# Patient Record
Sex: FEMALE | Race: WHITE | NOT HISPANIC OR LATINO | Employment: OTHER | URBAN - METROPOLITAN AREA
[De-identification: names, ages, dates, MRNs, and addresses within clinical notes are randomized per-mention and may not be internally consistent; named-entity substitution may affect disease eponyms.]

---

## 2018-04-13 ENCOUNTER — OFFICE VISIT (OUTPATIENT)
Dept: FAMILY MEDICINE | Facility: OTHER | Age: 68
End: 2018-04-13
Attending: NURSE PRACTITIONER
Payer: COMMERCIAL

## 2018-04-13 ENCOUNTER — RADIANT APPOINTMENT (OUTPATIENT)
Dept: GENERAL RADIOLOGY | Facility: OTHER | Age: 68
End: 2018-04-13
Attending: NURSE PRACTITIONER
Payer: COMMERCIAL

## 2018-04-13 VITALS
HEART RATE: 99 BPM | OXYGEN SATURATION: 95 % | RESPIRATION RATE: 18 BRPM | BODY MASS INDEX: 35.19 KG/M2 | WEIGHT: 191.2 LBS | DIASTOLIC BLOOD PRESSURE: 110 MMHG | TEMPERATURE: 98.5 F | HEIGHT: 62 IN | SYSTOLIC BLOOD PRESSURE: 180 MMHG

## 2018-04-13 DIAGNOSIS — I10 BENIGN ESSENTIAL HYPERTENSION: ICD-10-CM

## 2018-04-13 DIAGNOSIS — Z72.0 TOBACCO ABUSE: ICD-10-CM

## 2018-04-13 DIAGNOSIS — J01.00 SUBACUTE MAXILLARY SINUSITIS: ICD-10-CM

## 2018-04-13 DIAGNOSIS — E78.5 HYPERLIPIDEMIA LDL GOAL <100: ICD-10-CM

## 2018-04-13 DIAGNOSIS — L60.0 INGROWN TOENAIL: ICD-10-CM

## 2018-04-13 DIAGNOSIS — Z12.11 SPECIAL SCREENING FOR MALIGNANT NEOPLASMS, COLON: ICD-10-CM

## 2018-04-13 DIAGNOSIS — Z12.31 ENCOUNTER FOR SCREENING MAMMOGRAM FOR BREAST CANCER: ICD-10-CM

## 2018-04-13 DIAGNOSIS — Z76.89 ENCOUNTER TO ESTABLISH CARE: Primary | ICD-10-CM

## 2018-04-13 DIAGNOSIS — Z23 NEED FOR PROPHYLACTIC VACCINATION AND INOCULATION AGAINST COMBINATIONS OF DISEASE: ICD-10-CM

## 2018-04-13 DIAGNOSIS — Z71.6 TOBACCO ABUSE COUNSELING: ICD-10-CM

## 2018-04-13 PROBLEM — Z79.899 TAKING A STATIN MEDICATION: Status: ACTIVE | Noted: 2018-04-13

## 2018-04-13 LAB
ALBUMIN SERPL-MCNC: 4 G/DL (ref 3.4–5)
ALBUMIN UR-MCNC: NEGATIVE MG/DL
ALP SERPL-CCNC: 93 U/L (ref 40–150)
ALT SERPL W P-5'-P-CCNC: 65 U/L (ref 0–50)
ANION GAP SERPL CALCULATED.3IONS-SCNC: 5 MMOL/L (ref 3–14)
APPEARANCE UR: CLEAR
AST SERPL W P-5'-P-CCNC: 57 U/L (ref 0–45)
BASOPHILS # BLD AUTO: 0 10E9/L (ref 0–0.2)
BASOPHILS NFR BLD AUTO: 0.3 %
BILIRUB SERPL-MCNC: 0.8 MG/DL (ref 0.2–1.3)
BILIRUB UR QL STRIP: NEGATIVE
BUN SERPL-MCNC: 9 MG/DL (ref 7–30)
CALCIUM SERPL-MCNC: 9.6 MG/DL (ref 8.5–10.1)
CHLORIDE SERPL-SCNC: 105 MMOL/L (ref 94–109)
CHOLEST SERPL-MCNC: 229 MG/DL
CO2 SERPL-SCNC: 29 MMOL/L (ref 20–32)
COLOR UR AUTO: YELLOW
CREAT SERPL-MCNC: 0.81 MG/DL (ref 0.52–1.04)
DIFFERENTIAL METHOD BLD: ABNORMAL
EOSINOPHIL # BLD AUTO: 0.2 10E9/L (ref 0–0.7)
EOSINOPHIL NFR BLD AUTO: 1.7 %
ERYTHROCYTE [DISTWIDTH] IN BLOOD BY AUTOMATED COUNT: 13.9 % (ref 10–15)
GFR SERPL CREATININE-BSD FRML MDRD: 71 ML/MIN/1.7M2
GLUCOSE SERPL-MCNC: 98 MG/DL (ref 70–99)
GLUCOSE UR STRIP-MCNC: NEGATIVE MG/DL
HCT VFR BLD AUTO: 49.4 % (ref 35–47)
HDLC SERPL-MCNC: 38 MG/DL
HGB BLD-MCNC: 16.5 G/DL (ref 11.7–15.7)
HGB UR QL STRIP: ABNORMAL
KETONES UR STRIP-MCNC: NEGATIVE MG/DL
LDLC SERPL CALC-MCNC: 156 MG/DL
LEUKOCYTE ESTERASE UR QL STRIP: NEGATIVE
LYMPHOCYTES # BLD AUTO: 4.5 10E9/L (ref 0.8–5.3)
LYMPHOCYTES NFR BLD AUTO: 37.5 %
MCH RBC QN AUTO: 29.4 PG (ref 26.5–33)
MCHC RBC AUTO-ENTMCNC: 33.4 G/DL (ref 31.5–36.5)
MCV RBC AUTO: 88 FL (ref 78–100)
MONOCYTES # BLD AUTO: 0.6 10E9/L (ref 0–1.3)
MONOCYTES NFR BLD AUTO: 5 %
NEUTROPHILS # BLD AUTO: 6.7 10E9/L (ref 1.6–8.3)
NEUTROPHILS NFR BLD AUTO: 55.5 %
NITRATE UR QL: NEGATIVE
NONHDLC SERPL-MCNC: 191 MG/DL
PH UR STRIP: 6 PH (ref 5–7)
PLATELET # BLD AUTO: 143 10E9/L (ref 150–450)
POTASSIUM SERPL-SCNC: 4.1 MMOL/L (ref 3.4–5.3)
PROT SERPL-MCNC: 8.9 G/DL (ref 6.8–8.8)
RBC # BLD AUTO: 5.62 10E12/L (ref 3.8–5.2)
RBC #/AREA URNS AUTO: NORMAL /HPF
SODIUM SERPL-SCNC: 139 MMOL/L (ref 133–144)
SOURCE: ABNORMAL
SP GR UR STRIP: <=1.005 (ref 1–1.03)
T4 FREE SERPL-MCNC: 1.03 NG/DL (ref 0.76–1.46)
TRIGL SERPL-MCNC: 174 MG/DL
TSH SERPL DL<=0.005 MIU/L-ACNC: 5.58 MU/L (ref 0.4–4)
UROBILINOGEN UR STRIP-ACNC: 0.2 EU/DL (ref 0.2–1)
WBC # BLD AUTO: 12.1 10E9/L (ref 4–11)
WBC #/AREA URNS AUTO: NORMAL /HPF

## 2018-04-13 PROCEDURE — 84439 ASSAY OF FREE THYROXINE: CPT | Mod: ZL | Performed by: NURSE PRACTITIONER

## 2018-04-13 PROCEDURE — 80053 COMPREHEN METABOLIC PANEL: CPT | Mod: ZL | Performed by: NURSE PRACTITIONER

## 2018-04-13 PROCEDURE — 85025 COMPLETE CBC W/AUTO DIFF WBC: CPT | Mod: ZL | Performed by: NURSE PRACTITIONER

## 2018-04-13 PROCEDURE — G0463 HOSPITAL OUTPT CLINIC VISIT: HCPCS

## 2018-04-13 PROCEDURE — 90472 IMMUNIZATION ADMIN EACH ADD: CPT | Mod: GY | Performed by: NURSE PRACTITIONER

## 2018-04-13 PROCEDURE — 81001 URINALYSIS AUTO W/SCOPE: CPT | Mod: ZL | Performed by: NURSE PRACTITIONER

## 2018-04-13 PROCEDURE — 71046 X-RAY EXAM CHEST 2 VIEWS: CPT | Mod: TC,FY

## 2018-04-13 PROCEDURE — 36415 COLL VENOUS BLD VENIPUNCTURE: CPT | Mod: ZL | Performed by: NURSE PRACTITIONER

## 2018-04-13 PROCEDURE — 84443 ASSAY THYROID STIM HORMONE: CPT | Mod: ZL | Performed by: NURSE PRACTITIONER

## 2018-04-13 PROCEDURE — G0463 HOSPITAL OUTPT CLINIC VISIT: HCPCS | Mod: 25

## 2018-04-13 PROCEDURE — 90471 IMMUNIZATION ADMIN: CPT | Performed by: NURSE PRACTITIONER

## 2018-04-13 PROCEDURE — 99205 OFFICE O/P NEW HI 60 MIN: CPT | Performed by: NURSE PRACTITIONER

## 2018-04-13 PROCEDURE — 90670 PCV13 VACCINE IM: CPT | Performed by: NURSE PRACTITIONER

## 2018-04-13 PROCEDURE — 90715 TDAP VACCINE 7 YRS/> IM: CPT | Performed by: NURSE PRACTITIONER

## 2018-04-13 PROCEDURE — 80061 LIPID PANEL: CPT | Mod: ZL | Performed by: NURSE PRACTITIONER

## 2018-04-13 RX ORDER — LOSARTAN POTASSIUM 50 MG/1
50 TABLET ORAL DAILY
Qty: 30 TABLET | Refills: 1 | Status: SHIPPED | OUTPATIENT
Start: 2018-04-13 | End: 2018-04-25

## 2018-04-13 RX ORDER — SIMVASTATIN 20 MG
20 TABLET ORAL AT BEDTIME
Qty: 90 TABLET | Refills: 1 | Status: SHIPPED | OUTPATIENT
Start: 2018-04-13 | End: 2018-07-16

## 2018-04-13 RX ORDER — OMEGA-3 FATTY ACIDS/FISH OIL 300-1000MG
CAPSULE ORAL
Qty: 90 CAPSULE | Refills: 11 | COMMUNITY
Start: 2018-04-13

## 2018-04-13 RX ORDER — NICOTINE 21 MG/24HR
1 PATCH, TRANSDERMAL 24 HOURS TRANSDERMAL EVERY 24 HOURS
Qty: 28 PATCH | Refills: 5 | Status: SHIPPED | OUTPATIENT
Start: 2018-04-13 | End: 2019-04-16

## 2018-04-13 RX ORDER — AZITHROMYCIN 250 MG/1
TABLET, FILM COATED ORAL
Qty: 11 TABLET | Refills: 0 | Status: SHIPPED | OUTPATIENT
Start: 2018-04-13 | End: 2018-04-25

## 2018-04-13 ASSESSMENT — ANXIETY QUESTIONNAIRES
1. FEELING NERVOUS, ANXIOUS, OR ON EDGE: NOT AT ALL
IF YOU CHECKED OFF ANY PROBLEMS ON THIS QUESTIONNAIRE, HOW DIFFICULT HAVE THESE PROBLEMS MADE IT FOR YOU TO DO YOUR WORK, TAKE CARE OF THINGS AT HOME, OR GET ALONG WITH OTHER PEOPLE: SOMEWHAT DIFFICULT
6. BECOMING EASILY ANNOYED OR IRRITABLE: SEVERAL DAYS
3. WORRYING TOO MUCH ABOUT DIFFERENT THINGS: MORE THAN HALF THE DAYS
5. BEING SO RESTLESS THAT IT IS HARD TO SIT STILL: SEVERAL DAYS
2. NOT BEING ABLE TO STOP OR CONTROL WORRYING: MORE THAN HALF THE DAYS
GAD7 TOTAL SCORE: 9
7. FEELING AFRAID AS IF SOMETHING AWFUL MIGHT HAPPEN: NEARLY EVERY DAY

## 2018-04-13 ASSESSMENT — PAIN SCALES - GENERAL: PAINLEVEL: NO PAIN (0)

## 2018-04-13 ASSESSMENT — PATIENT HEALTH QUESTIONNAIRE - PHQ9: 5. POOR APPETITE OR OVEREATING: NOT AT ALL

## 2018-04-13 NOTE — NURSING NOTE
"Chief Complaint   Patient presents with     URI       Initial BP (!) 180/110 (BP Location: Right arm, Patient Position: Sitting, Cuff Size: Adult Regular)  Pulse 99  Temp 98.5  F (36.9  C) (Tympanic)  Resp 18  Ht 5' 2\" (1.575 m)  Wt 191 lb 3.2 oz (86.7 kg)  SpO2 95%  BMI 34.97 kg/m2 Estimated body mass index is 34.97 kg/(m^2) as calculated from the following:    Height as of this encounter: 5' 2\" (1.575 m).    Weight as of this encounter: 191 lb 3.2 oz (86.7 kg).  Medication Reconciliation: complete   Valerie Bhardwaj MA  "

## 2018-04-13 NOTE — MR AVS SNAPSHOT
After Visit Summary   4/13/2018    Aga Gary    MRN: [id number]           Patient Information     Date Of Birth          1950        Visit Information        Provider Department      4/13/2018 11:00 AM Ana Valdez NP [facility]        Today's Diagnoses     Encounter to establish care    -  1    Subacute maxillary sinusitis        Benign essential hypertension        Tobacco abuse        Encounter for screening mammogram for breast cancer        Ingrown toenail        Special screening for malignant neoplasms, colon        Tobacco abuse counseling          Care Instructions      ASSESSMENT/PLAN:     1. Encounter to establish care  - cholecalciferol (VITAMIN D3) 5000 units TABS tablet; Take 1 tablet (5,000 Units) by mouth daily  Dispense: 90 tablet; Refill: 11  - omega 3 1000 MG CAPS; 3 po daily  Dispense: 90 capsule; Refill: 11  - Multiple Vitamins-Iron (DAILY MULTIPLE VITAMIN/IRON) TABS; Take 1 tablet by mouth daily  Dispense: 90 tablet; Refill: 11      2. Subacute maxillary sinusitis  - Zithromax as directed      Fluids  Rest  Humidity at home - add bacteriostatic solution to humidifier  OTC Mucinex liquid cough and cold  Monitor for temp, treat as needed  Please go to the ER or UC if your symptoms worsen   Please return to clinic if unimproved      3. Benign essential hypertension  - aspirin 81 MG EC tablet; Take 1 tablet (81 mg) by mouth daily  Dispense: 90 tablet; Refill: 3  - TSH with free T4 reflex  - Lipid Profile  - Comprehensive metabolic panel  - CBC with platelets differential  - [facility]sc[facility]  - losartan (COZAAR) 50 MG tablet; Take 1 tablet (50 mg) by mouth daily  Dispense: 30 tablet; Refill: 1  - omega 3 1000 MG CAPS; 3 po daily  Dispense: 90 capsule; Refill: 11  - XR CHEST 2 VW (Clinic Performed); Future    4. Tobacco abuse  - QUITPLAN  Referral; Future  - Tobacco Cessation - Order to Satisfy Health Maintenance  -  nicotine (NICODERM CQ) 21 MG/24HR 24 hr patch; Place 1 patch onto the skin every 24 hours  Dispense: 28 patch; Refill: 5  - XR CHEST 2 VW (Clinic Performed);     5. Encounter for screening mammogram for breast cancer  - MA Screening Digital Bilateral ([facility]); Future    6. Ingrown toenail  - PODIATRY/FOOT & ANKLE SURGERY REFERRAL    7. Special screening for malignant neoplasms, colon  - GENERAL SURG ADULT REFERRAL    8. Tobacco abuse counseling  - QUITPLAN  Referral; Future  - Tobacco Cessation - Order to Satisfy Health Maintenance      FU with me in 2 weeks - sooner as needed  My nurse will call you with all results of labs      Ana Valdez NP  [facility]          HOW TO QUIT SMOKING  Smoking is one of the hardest habits to break. About half of all those who have ever smoked have been able to quit, and most of those (about 70%) who still smoke want to quit. Here are some of the best ways to stop smoking.     KEEP TRYING:  It takes most smokers about 8 tries before they are finally able to fully quit. So, the more often you try and fail, the better your chance of quitting the next time! So, don't give up!    GO COLD TURKEY:  Most ex-smokers quit cold turkey. Trying to cut back gradually doesn't seem to work as well, perhaps because it continues the smoking habit. Also, it is possible to fool yourself by inhaling more while smoking fewer cigarettes. This results in the same amount of nicotine in your body!    GET SUPPORT:  Support programs can make an important difference, especially for the heavy smoker. These groups offer lectures, methods to change your behavior and peer support. Call the free national Quitline for more information. 800-QUIT-NOW (090-151-2217). Low-cost or free programs are offered by many hospitals, local chapters of the American Lung Association (578-041-1424) and the American Cancer Society (653-395-8305). Support at home is important too. Non-smokers can help by offering  praise and encouragement. If the smoker fails to quit, encourage them to try again!    OVER-THE-COUNTER MEDICINES:  For those who can't quit on their own, Nicotine Replacement Therapy (NRT) may make quitting much easier. Certain aids such as the nicotine patch, gum and lozenge are available without a prescription. However, it is best to use these under the guidance of your doctor. The skin patch provides a steady supply of nicotine to the body. Nicotine gum and lozenge gives temporary bursts of low levels of nicotine. Both methods take the edge off the craving for cigarettes. WARNING: If you feel symptoms of nicotine overdose, such as nausea, vomiting, dizziness, weakness, or fast heartbeat, stop using these and see your doctor.    PRESCRIPTION MEDICINES:  After evaluating your smoking patterns and prior attempts at quitting, your doctor may offer a prescription medicine such as bupropion (Zyban, Wellbutrin), varenicline (Chantix, Champix), a niocotine inhaler or nasal spray. Each has its unique advantage and side effects which your doctor can review with you.    HEALTH BENEFITS OF QUITTING:  The benefits of quitting start right away and keep improving the longer you go without smokin minutes: blood pressure and pulse return to normal  8 hours: oxygen levels return to normal  2 days: ability to smell and taste begins to improve as damaged nerves start to regrow  2-3 weeks: circulation and lung function improves  1-9 months: decreased cough, congestion and shortness of breath; less tired  1 year: risk of heart attack decreases by half  5 years: risk of lung cancer decreases by half; risk of stroke becomes the same as a non-smoker  For information about how to quit smoking, visit the following links:  National Cancer [place] ,   Clearing the Air, Quit Smoking Today   - an online booklet. http://www.smokefree.gov/pubs/clearing_the_air.pdf  Smokefree.gov http://smokefree.gov/  QuitNet http://www.quitnet.com/     0195-4068 [facility], [address] [place] [address], [place] [address]. All rights reserved. This information is not intended as a substitute for professional medical care. Always follow your healthcare professional's instructions.    The Benefits of Living Smoke Free  What do you want to gain from quitting? Check off some reasons to quit.  Health Benefits  ___ Reduce my risk of lung cancer, heart disease, chronic lung disease  ___ Have fewer wrinkles and softer skin  ___ Improve my sense of taste and smell  ___ For pregnant women--reduce the risk of having a miscarriage, stillbirth, premature birth, or low-birth-weight baby  Personal Benefits  ___ Feel more in control of my life  ___ Have better-smelling hair, breath, clothes, home, and car  ___ Save time by not having to take smoke breaks, buy cigarettes, or hunt for a light  ___ Have whiter teeth  Family Benefits  ___ Reduce my children s respiratory tract infections  ___ Set a good example for my children  ___ Reduce my family s cancer risk  Financial Benefits  ___ Save hundreds of dollars each year that would be spent on cigarettes  ___ Save money on medical bills  ___ Save on life, health, and car insurance premiums    Those Dollars Add Up!  Cigarettes are expensive, and getting more expensive all the time. Do you realize how much money you are spending on cigarettes per year? What is the average amount you spend on a pack of cigarettes? What is the average number of packs that you smoke per day? Using your answers to these questions, fill in this formula to help you find out:  ($ _____ per pack) ×  ( _____ number of packs per day) × (365 days) =  $ _____ yearly cost of smoking  Besides tobacco, there are other costs, including extra cleaning bills and replacement costs for clothing and furniture; medical expenses for smoking-related illnesses; and higher health, life, and car insurance premiums.    Cigars and Pipes Count Too!  Cigars and pipes are also  dangerous. So are smokeless (chewing) tobacco and snuff. All of these products contain nicotine, a highly addictive substance that has harmful effects on your body. Quitting smoking means giving up all tobacco products.      4971-7401 Hardy [employer], [address] [facility], [place] [address]. All rights reserved. This information is not intended as a substitute for professional medical care. Always follow your healthcare professional's instructions.          Follow-ups after your visit        Additional Services     GENERAL SURG ADULT REFERRAL       Your provider has referred you to: General surgery - [facility] - colon consult    Please be aware that coverage of these services is subject to the terms and limitations of your health insurance plan.  Call member services at your health plan with any benefit or coverage questions.      Please bring the following with you to your appointment:    (1) Any X-Rays, CTs or MRIs which have been performed.  Contact the facility where they were done to arrange for  prior to your scheduled appointment.   (2) List of current medications   (3) This referral request   (4) Any documents/labs given to you for this referral            PODIATRY/FOOT & ANKLE SURGERY REFERRAL       Your provider has referred you to: Ingrown right great toenail - painful - [facility] foot and ankle    Please be aware that coverage of these services is subject to the terms and limitations of your health insurance plan.  Call member services at your health plan with any benefit or coverage questions.      Please bring the following to your appointment:  >>   Any x-rays, CTs or MRIs which have been performed.  Contact the facility where they were done to arrange for  prior to your scheduled appointment.    >>   List of current medications   >>   This referral request   >>   Any documents/labs given to you for this referral            QUITPLAN  Referral       [place] TO[employer]AC[employer] QUITLINES FAX  FORM  Fax form to: [phone]    The clinic will facilitate the referral to the quitline.    Provider Information:  ===============================================================  Ana Valdez NP  ID#: [id number] - [facility] [phone] Fax: [phone]   [email or web address]  Payor: [employer] / Plan: UCARE FOR SENIORS / Product Type: HMO /   ===============================================================    The Public Health Service Guideline does not recommend providing over-the-counter nicotine replacement therapy products without physician authorization to patients with the following conditions: pregnancy, uncontrolled high blood pressure, or cardiovascular diseases.     I authorize the [place] Tobacco Quitlines to provide over-the-counter nicotine replacement products for the patient listed below if the patient's health plan benefits cover NRT or if the patient is eligible for QUITPLAN services.    Patient Consented to:  ===============================================================  - YES - I am ready to quit tobacco and request the above information be given to the quitline so they may contact me.  I understand that one of [place]'s Tobacco Quitlines will inform my provider about my participation.  ===============================================================  Please check the BEST 3-hour call window for them to reach you: 2pm - 5pm  May we leave a message?  YES  Language Preference:  English  Phone Number: Home Phone      [phone]  Mobile          None     E-mail Address: No e-mail address on record    ========================================================================  FOR QUITLINE USE ONLY:  THIS INFORMATION WILL BE PROVIDED BACK TO THE PROVIDER  Contact date: __/ __/__ or ____ Did not reach after three attempts.    Outcome:  __ Enrolled in telephone counseling program  __ Declined  __ Not Reached    Stage of  "readiness: _______________________  Planned Quit Date: ___/ ___/ ___  Comments:       [facility]   This message funded by Blue Cross and Blue [employer] [facility], an independent licensee of the Blue Cross and Blue Shield Association. Rev. 12                  Future tests that were ordered for you today     Open Future Orders        Priority Expected Expires Ordered    MA Screening Digital Bilateral ([facility]) Routine  2019    QUITPLAN  Referral Routine  2018            Who to contact     If you have questions or need follow up information about today's clinic visit or your schedule please contact [facility] directly at [phone].  Normal or non-critical lab and imaging results will be communicated to you by MyChart, letter or phone within 4 business days after the clinic has received the results. If you do not hear from us within 7 days, please contact the clinic through MyChart or phone. If you have a critical or abnormal lab result, we will notify you by phone as soon as possible.  Submit refill requests through [employer] or call your pharmacy and they will forward the refill request to us. Please allow 3 business days for your refill to be completed.          Additional Information About Your Visit        SPIL GAMEShart Information     Vquence lets you send messages to your doctor, view your test results, renew your prescriptions, schedule appointments and more. To sign up, go to [email or web address] . Click on \"Log in\" on the left side of the screen, which will take you to the Welcome page. Then click on \"Sign up Now\" on the right side of the page.     You will be asked to enter the access code listed below, as well as some personal information. Please follow the directions to create your username and password.     Your access code is: [email or web address]  Expires: 2018 11:47 AM     Your access code will  in 90 days. If you need help or a " "new code, please call your [place] clinic or [phone].        Care EveryWhere ID     This is your Care EveryWhere ID. This could be used by other organizations to access your [place] medical records  [id number]        Your Vitals Were     Pulse Temperature Respirations Height Pulse Oximetry BMI (Body Mass Index)    99 98.5  F (36.9  C) (Tympanic) 18 5' 2\" (1.575 m) 95% 34.97 kg/m2       Blood Pressure from Last 3 Encounters:   04/13/18 (!) 180/110    Weight from Last 3 Encounters:   04/13/18 191 lb 3.2 oz (86.7 kg)              We Performed the Following     *UA reflex to Microscopic and Culture - [facility]     CBC with platelets differential     Comprehensive metabolic panel     GENERAL SURG ADULT REFERRAL     Lipid Profile     PODIATRY/FOOT & ANKLE SURGERY REFERRAL     Tobacco Cessation - Order to Satisfy Health Maintenance     TSH with free T4 reflex          Today's Medication Changes          These changes are accurate as of 4/13/18 11:47 AM.  If you have any questions, ask your nurse or doctor.               Start taking these medicines.        Dose/Directions    aspirin 81 MG EC tablet   Used for:  Benign essential hypertension   Started by:  Ana Valdez NP        Dose:  81 mg   Take 1 tablet (81 mg) by mouth daily   Quantity:  90 tablet   Refills:  3       azithromycin 250 MG tablet   Commonly known as:  ZITHROMAX   Used for:  Encounter to establish care   Started by:  Ana Valdez NP        Two tablets first day, then one tablet daily for 9 days.   Quantity:  11 tablet   Refills:  0       cholecalciferol 5000 units Tabs tablet   Commonly known as:  vitamin D3   Used for:  Encounter to establish care   Started by:  Ana Valdez NP        Dose:  5000 Units   Take 1 tablet (5,000 Units) by mouth daily   Quantity:  90 tablet   Refills:  11       DAILY MULTIPLE VITAMIN/IRON Tabs   Used for:  Encounter to establish care   Started by:  Ana Valdez NP        Dose:  1 tablet   Take 1 tablet by mouth " daily   Quantity:  90 tablet   Refills:  11       losartan 50 MG tablet   Commonly known as:  COZAAR   Used for:  Benign essential hypertension   Started by:  Ana Valdez NP        Dose:  50 mg   Take 1 tablet (50 mg) by mouth daily   Quantity:  30 tablet   Refills:  1       nicotine 21 MG/24HR 24 hr patch   Commonly known as:  NICODERM CQ   Used for:  Tobacco abuse   Started by:  Ana Valdez NP        Dose:  1 patch   Place 1 patch onto the skin every 24 hours   Quantity:  28 patch   Refills:  5       omega 3 1000 MG Caps   Used for:  Encounter to establish care, Benign essential hypertension   Started by:  Ana Valdez NP        3 po daily   Quantity:  90 capsule   Refills:  11            Where to get your medicines      These medications were sent to [facility] - [place], [place] - [address]  [address] [place] [address]     Phone:  [phone]     aspirin 81 MG EC tablet    azithromycin 250 MG tablet    losartan 50 MG tablet    nicotine 21 MG/24HR 24 hr patch         Some of these will need a paper prescription and others can be bought over the counter.  Ask your nurse if you have questions.     You don't need a prescription for these medications     cholecalciferol 5000 units Tabs tablet    DAILY MULTIPLE VITAMIN/IRON Tabs    omega 3 1000 MG Caps                Primary Care Provider Fax #    Physician No Ref-Primary [phone]       No address on file        Equal Access to Services     CLAUDIA COOL AH: Bertram quesada Sonaveed, waaxda luqadaha, qaybta kaalmada adeegyada, chandrika edward. So [facility] [phone].    ATENCIÓN: Si habla español, tiene a roy disposición servicios gratuitos de asistencia lingüística. Llame al [phone].    We comply with applicable federal civil rights laws and [place] laws. We do not discriminate on the basis of race, color, national origin, age, disability, sex, sexual orientation, or gender identity.            Thank you!     Thank you for  choosing [facility]  for your care. Our goal is always to provide you with excellent care. Hearing back from our patients is one way we can continue to improve our services. Please take a few minutes to complete the written survey that you may receive in the mail after your visit with us. Thank you!             Your Updated Medication List - Protect others around you: Learn how to safely use, store and throw away your medicines at www.disposemymeds.org.          This list is accurate as of 4/13/18 11:47 AM.  Always use your most recent med list.                   Brand Name Dispense Instructions for use Diagnosis    aspirin 81 MG EC tablet     90 tablet    Take 1 tablet (81 mg) by mouth daily    Benign essential hypertension       azithromycin 250 MG tablet    ZITHROMAX    11 tablet    Two tablets first day, then one tablet daily for 9 days.    Encounter to establish care       cholecalciferol 5000 units Tabs tablet    vitamin D3    90 tablet    Take 1 tablet (5,000 Units) by mouth daily    Encounter to establish care       DAILY MULTIPLE VITAMIN/IRON Tabs     90 tablet    Take 1 tablet by mouth daily    Encounter to establish care       losartan 50 MG tablet    COZAAR    30 tablet    Take 1 tablet (50 mg) by mouth daily    Benign essential hypertension       nicotine 21 MG/24HR 24 hr patch    NICODERM CQ    28 patch    Place 1 patch onto the skin every 24 hours    Tobacco abuse       omega 3 1000 MG Caps     90 capsule    3 po daily    Encounter to establish care, Benign essential hypertension

## 2018-04-13 NOTE — PATIENT INSTRUCTIONS
ASSESSMENT/PLAN:     1. Encounter to establish care  - cholecalciferol (VITAMIN D3) 5000 units TABS tablet; Take 1 tablet (5,000 Units) by mouth daily  Dispense: 90 tablet; Refill: 11  - omega 3 1000 MG CAPS; 3 po daily  Dispense: 90 capsule; Refill: 11  - Multiple Vitamins-Iron (DAILY MULTIPLE VITAMIN/IRON) TABS; Take 1 tablet by mouth daily  Dispense: 90 tablet; Refill: 11      2. Subacute maxillary sinusitis  - Zithromax as directed      Fluids  Rest  Humidity at home - add bacteriostatic solution to humidifier  OTC Mucinex liquid cough and cold  Monitor for temp, treat as needed  Please go to the ER or UC if your symptoms worsen   Please return to clinic if unimproved      3. Benign essential hypertension  - aspirin 81 MG EC tablet; Take 1 tablet (81 mg) by mouth daily  Dispense: 90 tablet; Refill: 3  - TSH with free T4 reflex  - Lipid Profile  - Comprehensive metabolic panel  - CBC with platelets differential  - UA reflex to Microscopic and Culture - [facility]/[place]  - losartan (COZAAR) 50 MG tablet; Take 1 tablet (50 mg) by mouth daily  Dispense: 30 tablet; Refill: 1  - omega 3 1000 MG CAPS; 3 po daily  Dispense: 90 capsule; Refill: 11  - XR CHEST 2 VW (Clinic Performed); Future    4. Tobacco abuse  - QUITPLAN  Referral; Future  - Tobacco Cessation - Order to Satisfy Health Maintenance  - nicotine (NICODERM CQ) 21 MG/24HR 24 hr patch; Place 1 patch onto the skin every 24 hours  Dispense: 28 patch; Refill: 5  - XR CHEST 2 VW (Clinic Performed);     5. Encounter for screening mammogram for breast cancer  - MA Screening Digital Bilateral ([facility]); Future    6. Ingrown toenail  - PODIATRY/FOOT & ANKLE SURGERY REFERRAL    7. Special screening for malignant neoplasms, colon  - GENERAL SURG ADULT REFERRAL    8. Tobacco abuse counseling  - QUITPLAN  Referral; Future  - Tobacco Cessation - Order to Satisfy Health Maintenance      FU with me in 2 weeks - sooner as needed  My nurse will call you with all  results of labs      Ana Valdez NP  [facility]          HOW TO QUIT SMOKING  Smoking is one of the hardest habits to break. About half of all those who have ever smoked have been able to quit, and most of those (about 70%) who still smoke want to quit. Here are some of the best ways to stop smoking.     KEEP TRYING:  It takes most smokers about 8 tries before they are finally able to fully quit. So, the more often you try and fail, the better your chance of quitting the next time! So, don't give up!    GO COLD TURKEY:  Most ex-smokers quit cold turkey. Trying to cut back gradually doesn't seem to work as well, perhaps because it continues the smoking habit. Also, it is possible to fool yourself by inhaling more while smoking fewer cigarettes. This results in the same amount of nicotine in your body!    GET SUPPORT:  Support programs can make an important difference, especially for the heavy smoker. These groups offer lectures, methods to change your behavior and peer support. Call the free national Quitline for more information. 800-QUIT-NOW (396-619-7719). Low-cost or free programs are offered by many hospitals, local chapters of the American Lung Association (063-470-9116) and the American Cancer Society (592-911-8601). Support at home is important too. Non-smokers can help by offering praise and encouragement. If the smoker fails to quit, encourage them to try again!    OVER-THE-COUNTER MEDICINES:  For those who can't quit on their own, Nicotine Replacement Therapy (NRT) may make quitting much easier. Certain aids such as the nicotine patch, gum and lozenge are available without a prescription. However, it is best to use these under the guidance of your doctor. The skin patch provides a steady supply of nicotine to the body. Nicotine gum and lozenge gives temporary bursts of low levels of nicotine. Both methods take the edge off the craving for cigarettes. WARNING: If you feel symptoms of nicotine  overdose, such as nausea, vomiting, dizziness, weakness, or fast heartbeat, stop using these and see your doctor.    PRESCRIPTION MEDICINES:  After evaluating your smoking patterns and prior attempts at quitting, your doctor may offer a prescription medicine such as bupropion (Zyban, Wellbutrin), varenicline (Chantix, Champix), a niocotine inhaler or nasal spray. Each has its unique advantage and side effects which your doctor can review with you.    HEALTH BENEFITS OF QUITTING:  The benefits of quitting start right away and keep improving the longer you go without smokin minutes: blood pressure and pulse return to normal  8 hours: oxygen levels return to normal  2 days: ability to smell and taste begins to improve as damaged nerves start to regrow  2-3 weeks: circulation and lung function improves  1-9 months: decreased cough, congestion and shortness of breath; less tired  1 year: risk of heart attack decreases by half  5 years: risk of lung cancer decreases by half; risk of stroke becomes the same as a non-smoker  For information about how to quit smoking, visit the following links:  National [employer] ,   Clearing the Air, Quit Smoking Today   - an online booklet. http://www.smokefree.gov/pubs/clearing_the_air.pdf  Smokefree.gov http://smokefree.gov/  QuitNet http://www.quitnet.com/    6442-3963 [employer], [address] [place] [address]. All rights reserved. This information is not intended as a substitute for professional medical care. Always follow your healthcare professional's instructions.    The Benefits of Living Smoke Free  What do you want to gain from quitting? Check off some reasons to quit.  Health Benefits  ___ Reduce my risk of lung cancer, heart disease, chronic lung disease  ___ Have fewer wrinkles and softer skin  ___ Improve my sense of taste and smell  ___ For pregnant women reduce the risk of having a miscarriage, stillbirth, premature birth, or low-birth-weight  baby  Personal Benefits  ___ Feel more in control of my life  ___ Have better-smelling hair, breath, clothes, home, and car  ___ Save time by not having to take smoke breaks, buy cigarettes, or hunt for a light  ___ Have whiter teeth  Family Benefits  ___ Reduce my children s respiratory tract infections  ___ Set a good example for my children  ___ Reduce my family s cancer risk  Financial Benefits  ___ Save hundreds of dollars each year that would be spent on cigarettes  ___ Save money on medical bills  ___ Save on life, health, and car insurance premiums    Those Dollars Add Up!  Cigarettes are expensive, and getting more expensive all the time. Do you realize how much money you are spending on cigarettes per year? What is the average amount you spend on a pack of cigarettes? What is the average number of packs that you smoke per day? Using your answers to these questions, fill in this formula to help you find out:  ($ _____ per pack) ×  ( _____ number of packs per day) × (365 days) =  $ _____ yearly cost of smoking  Besides tobacco, there are other costs, including extra cleaning bills and replacement costs for clothing and furniture; medical expenses for smoking-related illnesses; and higher health, life, and car insurance premiums.    Cigars and Pipes Count Too!  Cigars and pipes are also dangerous. So are smokeless (chewing) tobacco and snuff. All of these products contain nicotine, a highly addictive substance that has harmful effects on your body. Quitting smoking means giving up all tobacco products.      3171-9315 [facility], [address] [place] [address], [place] [address]. All rights reserved. This information is not intended as a substitute for professional medical care. Always follow your healthcare professional's instructions.

## 2018-04-13 NOTE — PROGRESS NOTES
SUBJECTIVE:                                                    Aga Gary is a 67 year old female who presents to clinic today for the following health issues:      Establish Care      RESPIRATORY SYMPTOMS    Duration: a week    Description  nasal congestion, sore throat, facial pain/pressure, cough, headache, fatigue/malaise and diarrhea    Severity: moderate    Accompanying signs and symptoms: None    History (predisposing factors):  strep exposure    Precipitating or alleviating factors: None    Therapies tried and outcome:  rest and fluids and cold/flu medicine        Left Big Toe sore    Duration: 2 days    Description (location/character/radiation): sore near nail of left big toe    Intensity:  moderate    Accompanying signs and symptoms: none    History (similar episodes/previous evaluation): None    Precipitating or alleviating factors: None    Therapies tried and outcome: None         HTN - new diagnosis, has not checked BP's at home.  Is not following a low salt diet    Strong FH of CVD    Has not had health care in years.  Due for ALL preventive screening, labs, and vaccinations.      Problem list and histories reviewed & adjusted, as indicated.  Additional history: as documented    Patient Active Problem List   Diagnosis     Benign essential hypertension     Tobacco abuse     History reviewed. No pertinent surgical history.    Social History   Substance Use Topics     Smoking status: Current Every Day Smoker     Smokeless tobacco: Never Used     Alcohol use No     History reviewed. No pertinent family history.      Current Outpatient Prescriptions   Medication Sig Dispense Refill     nicotine (NICODERM CQ) 21 MG/24HR 24 hr patch Place 1 patch onto the skin every 24 hours 28 patch 5     azithromycin (ZITHROMAX) 250 MG tablet Two tablets first day, then one tablet daily for 9 days. 11 tablet 0     aspirin 81 MG EC tablet Take 1 tablet (81 mg) by mouth daily 90 tablet 3     losartan (COZAAR) 50 MG  "tablet Take 1 tablet (50 mg) by mouth daily 30 tablet 1     cholecalciferol (VITAMIN D3) 5000 units TABS tablet Take 1 tablet (5,000 Units) by mouth daily 90 tablet 11     omega 3 1000 MG CAPS 3 po daily 90 capsule 11     Multiple Vitamins-Iron (DAILY MULTIPLE VITAMIN/IRON) TABS Take 1 tablet by mouth daily 90 tablet 11     No Known Allergies  No lab results found.   BP Readings from Last 3 Encounters:   04/13/18 (!) 180/110    Wt Readings from Last 3 Encounters:   04/13/18 191 lb 3.2 oz (86.7 kg)                  Labs reviewed in EPIC    ROS:  CONSTITUTIONAL: NEGATIVE for fever, chills, change in weight  INTEGUMENTARY/SKIN: NEGATIVE for worrisome rashes, moles or lesions  EYES: NEGATIVE for vision changes or irritation  ENT/MOUTH: PND, facial pressure  RESP: Cough with her URI  BREAST: NEGATIVE for masses, tenderness or discharge  CV: NEGATIVE for chest pain, palpitations or peripheral edema  GI: NEGATIVE for nausea, abdominal pain, heartburn, or change in bowel habits  : NEGATIVE for frequency, dysuria, or hematuria  MUSCULOSKELETAL: NEGATIVE for significant arthralgias or myalgia  NEURO: NEGATIVE for weakness, dizziness or paresthesias  ENDOCRINE: NEGATIVE for temperature intolerance, skin/hair changes  HEME: NEGATIVE for bleeding problems  PSYCHIATRIC: NEGATIVE for changes in mood or affect    OBJECTIVE:     BP (!) 180/110 (BP Location: Right arm, Patient Position: Sitting, Cuff Size: Adult Regular)  Pulse 99  Temp 98.5  F (36.9  C) (Tympanic)  Resp 18  Ht 5' 2\" (1.575 m)  Wt 191 lb 3.2 oz (86.7 kg)  SpO2 95%  BMI 34.97 kg/m2  Body mass index is 34.97 kg/(m^2).     GENERAL: healthy, alert and no distress  EYES: Eyes grossly normal to inspection, PERRL and conjunctivae and sclerae normal  HENT: sinuses: maxillary, frontal tenderness on both sides, yellow PND  NECK: no adenopathy, no asymmetry, masses, or scars and thyroid normal to palpation  RESP: lungs clear to auscultation - no rales, rhonchi or " wheezes  CV: regular rate and rhythm, normal S1 S2, no S3 or S4, no murmur, click or rub, no peripheral edema and peripheral pulses strong  ABDOMEN: soft, nontender, no hepatosplenomegaly, no masses and bowel sounds normal  MS: no gross musculoskeletal defects noted, no edema  SKIN: no suspicious lesions or rashes  NEURO: Normal strength and tone, mentation intact and speech normal  PSYCH: mentation appears normal, affect normal/bright        Results for orders placed or performed in visit on 04/13/18 (from the past 48 hour(s))   TSH with free T4 reflex   Result Value Ref Range    TSH 5.58 (H) 0.40 - 4.00 mU/L   Lipid Profile   Result Value Ref Range    Cholesterol 229 (H) <200 mg/dL    Triglycerides 174 (H) <150 mg/dL    HDL Cholesterol 38 (L) >49 mg/dL    LDL Cholesterol Calculated 156 (H) <100 mg/dL    Non HDL Cholesterol 191 (H) <130 mg/dL   Comprehensive metabolic panel   Result Value Ref Range    Sodium 139 133 - 144 mmol/L    Potassium 4.1 3.4 - 5.3 mmol/L    Chloride 105 94 - 109 mmol/L    Carbon Dioxide 29 20 - 32 mmol/L    Anion Gap 5 3 - 14 mmol/L    Glucose 98 70 - 99 mg/dL    Urea Nitrogen 9 7 - 30 mg/dL    Creatinine 0.81 0.52 - 1.04 mg/dL    GFR Estimate 71 >60 mL/min/1.7m2    GFR Estimate If Black 86 >60 mL/min/1.7m2    Calcium 9.6 8.5 - 10.1 mg/dL    Bilirubin Total 0.8 0.2 - 1.3 mg/dL    Albumin 4.0 3.4 - 5.0 g/dL    Protein Total 8.9 (H) 6.8 - 8.8 g/dL    Alkaline Phosphatase 93 40 - 150 U/L    ALT 65 (H) 0 - 50 U/L    AST 57 (H) 0 - 45 U/L   CBC with platelets differential   Result Value Ref Range    WBC 12.1 (H) 4.0 - 11.0 10e9/L    RBC Count 5.62 (H) 3.8 - 5.2 10e12/L    Hemoglobin 16.5 (H) 11.7 - 15.7 g/dL    Hematocrit 49.4 (H) 35.0 - 47.0 %    MCV 88 78 - 100 fl    MCH 29.4 26.5 - 33.0 pg    MCHC 33.4 31.5 - 36.5 g/dL    RDW 13.9 10.0 - 15.0 %    Platelet Count 143 (L) 150 - 450 10e9/L    Diff Method Automated Method     % Neutrophils 55.5 %    % Lymphocytes 37.5 %    % Monocytes 5.0 %    %  Eosinophils 1.7 %    % Basophils 0.3 %    Absolute Neutrophil 6.7 1.6 - 8.3 10e9/L    Absolute Lymphocytes 4.5 0.8 - 5.3 10e9/L    Absolute Monocytes 0.6 0.0 - 1.3 10e9/L    Absolute Eosinophils 0.2 0.0 - 0.7 10e9/L    Absolute Basophils 0.0 0.0 - 0.2 10e9/L   T4 free   Result Value Ref Range    T4 Free 1.03 0.76 - 1.46 ng/dL   *UA reflex to Microscopic and Culture - [facility]   Result Value Ref Range    Color Urine Yellow     Appearance Urine Clear     Glucose Urine Negative NEG^Negative mg/dL    Bilirubin Urine Negative NEG^Negative    Ketones Urine Negative NEG^Negative mg/dL    Specific Gravity Urine <=1.005 1.003 - 1.035    Blood Urine Trace (A) NEG^Negative    pH Urine 6.0 5.0 - 7.0 pH    Protein Albumin Urine Negative NEG^Negative mg/dL    Urobilinogen Urine 0.2 0.2 - 1.0 EU/dL    Nitrite Urine Negative NEG^Negative    Leukocyte Esterase Urine Negative NEG^Negative    Source Midstream Urine    Urine Microscopic   Result Value Ref Range    WBC Urine 0 - 5 OTO5^0 - 5 /HPF    RBC Urine O - 2 OTO2^O - 2 /HPF       Visit time:   Over 60 minutes are spent with the patient.   Greater than 50 % of our visit time was spent face to face and included education and counseling regarding current conditions,  medication management, and plan of care.  We discussed the importance of medication compliance.  Visit Content:   All preventive health discussed and arranged  Tobacco education provided  FU visits scheduled  Lab testing discussed and reviewed  Any medication adjustment has been reviewed  Health Maintenance - updated as appropriate.  Record review completed      ASSESSMENT/PLAN:     1. Encounter to establish care  - cholecalciferol (VITAMIN D3) 5000 units TABS tablet; Take 1 tablet (5,000 Units) by mouth daily  Dispense: 90 tablet; Refill: 11  - omega 3 1000 MG CAPS; 3 po daily  Dispense: 90 capsule; Refill: 11  - Multiple Vitamins-Iron (DAILY MULTIPLE VITAMIN/IRON) TABS; Take 1 tablet by mouth daily  Dispense:  90 tablet; Refill: 11      2. Subacute maxillary sinusitis  - Zithromax as directed      Fluids  Rest  Humidity at home - add bacteriostatic solution to humidifier  OTC Mucinex liquid cough and cold  Monitor for temp, treat as needed  Please go to the ER or UC if your symptoms worsen   Please return to clinic if unimproved      3. Benign essential hypertension  - aspirin 81 MG EC tablet; Take 1 tablet (81 mg) by mouth daily  Dispense: 90 tablet; Refill: 3  - TSH with free T4 reflex  - Lipid Profile  - Comprehensive metabolic panel  - CBC with platelets differential  - UA reflex to Microscopic and Culture - [facility]/[place]  - losartan (COZAAR) 50 MG tablet; Take 1 tablet (50 mg) by mouth daily  Dispense: 30 tablet; Refill: 1  - omega 3 1000 MG CAPS; 3 po daily  Dispense: 90 capsule; Refill: 11  - XR CHEST 2 VW (Clinic Performed); Future    4. Tobacco abuse  - QUITPLAN  Referral; Future  - Tobacco Cessation - Order to Satisfy Health Maintenance  - nicotine (NICODERM CQ) 21 MG/24HR 24 hr patch; Place 1 patch onto the skin every 24 hours  Dispense: 28 patch; Refill: 5  - XR CHEST 2 VW (Clinic Performed);     5. Encounter for screening mammogram for breast cancer  - MA Screening Digital Bilateral ([facility]); Future    6. Ingrown toenail  - PODIATRY/FOOT & ANKLE SURGERY REFERRAL    7. Special screening for malignant neoplasms, colon  - GENERAL SURG ADULT REFERRAL    8. Tobacco abuse counseling  - QUITPLAN  Referral; Future  - Tobacco Cessation - Order to Satisfy Health Maintenance      FU with me in 2 weeks - sooner as needed  My nurse will call you with all results of labs      Ana Valdez NP  [facility]

## 2018-04-13 NOTE — PROGRESS NOTES
Lipid profile elevated - HTN - smoker - see below for risk report:    The 10-year ASCVD risk score (Sonyaagustin NORWOOD Jr, et al., 2013) is: 32%    Values used to calculate the score:      Age: 67 years      Sex: Female      Is Non- : No      Diabetic: No      Tobacco smoker: Yes      Systolic Blood Pressure: 180 mmHg      Is BP treated: Yes      HDL Cholesterol: 38 mg/dL      Total Cholesterol: 229 mg/dL      I sent Zocor to [facility].    TSH a bit elevated, will recheck in 6 weeks - will discuss at visit for BP recheck (scheduled)    Ill go over her labs with Dr Aguirre Monday as well.    Ana PLEITEZOK  [phone]

## 2018-04-14 ASSESSMENT — ANXIETY QUESTIONNAIRES: GAD7 TOTAL SCORE: 9

## 2018-04-14 ASSESSMENT — PATIENT HEALTH QUESTIONNAIRE - PHQ9: SUM OF ALL RESPONSES TO PHQ QUESTIONS 1-9: 18

## 2018-04-19 ENCOUNTER — TRANSFERRED RECORDS (OUTPATIENT)
Dept: HEALTH INFORMATION MANAGEMENT | Facility: CLINIC | Age: 68
End: 2018-04-19

## 2018-04-25 ENCOUNTER — OFFICE VISIT (OUTPATIENT)
Dept: FAMILY MEDICINE | Facility: OTHER | Age: 68
End: 2018-04-25
Attending: NURSE PRACTITIONER
Payer: COMMERCIAL

## 2018-04-25 VITALS
HEIGHT: 62 IN | TEMPERATURE: 98 F | OXYGEN SATURATION: 91 % | HEART RATE: 87 BPM | SYSTOLIC BLOOD PRESSURE: 158 MMHG | WEIGHT: 194 LBS | RESPIRATION RATE: 18 BRPM | BODY MASS INDEX: 35.7 KG/M2 | DIASTOLIC BLOOD PRESSURE: 90 MMHG

## 2018-04-25 DIAGNOSIS — E78.5 HYPERLIPIDEMIA LDL GOAL <100: Primary | ICD-10-CM

## 2018-04-25 DIAGNOSIS — I10 BENIGN ESSENTIAL HYPERTENSION: ICD-10-CM

## 2018-04-25 PROCEDURE — 99214 OFFICE O/P EST MOD 30 MIN: CPT | Performed by: NURSE PRACTITIONER

## 2018-04-25 PROCEDURE — G0463 HOSPITAL OUTPT CLINIC VISIT: HCPCS

## 2018-04-25 RX ORDER — LOSARTAN POTASSIUM 50 MG/1
TABLET ORAL
Qty: 45 TABLET | Refills: 1 | Status: SHIPPED | OUTPATIENT
Start: 2018-04-25 | End: 2018-05-23

## 2018-04-25 ASSESSMENT — ANXIETY QUESTIONNAIRES
2. NOT BEING ABLE TO STOP OR CONTROL WORRYING: MORE THAN HALF THE DAYS
GAD7 TOTAL SCORE: 10
1. FEELING NERVOUS, ANXIOUS, OR ON EDGE: MORE THAN HALF THE DAYS
6. BECOMING EASILY ANNOYED OR IRRITABLE: SEVERAL DAYS
7. FEELING AFRAID AS IF SOMETHING AWFUL MIGHT HAPPEN: SEVERAL DAYS
3. WORRYING TOO MUCH ABOUT DIFFERENT THINGS: MORE THAN HALF THE DAYS
IF YOU CHECKED OFF ANY PROBLEMS ON THIS QUESTIONNAIRE, HOW DIFFICULT HAVE THESE PROBLEMS MADE IT FOR YOU TO DO YOUR WORK, TAKE CARE OF THINGS AT HOME, OR GET ALONG WITH OTHER PEOPLE: NOT DIFFICULT AT ALL
5. BEING SO RESTLESS THAT IT IS HARD TO SIT STILL: SEVERAL DAYS

## 2018-04-25 ASSESSMENT — PATIENT HEALTH QUESTIONNAIRE - PHQ9: 5. POOR APPETITE OR OVEREATING: SEVERAL DAYS

## 2018-04-25 ASSESSMENT — PAIN SCALES - GENERAL: PAINLEVEL: NO PAIN (0)

## 2018-04-25 NOTE — MR AVS SNAPSHOT
After Visit Summary   4/25/2018    Aga Gary    MRN: [id number]           Patient Information     Date Of Birth          1950        Visit Information        Provider Department      4/25/2018 1:15 PM Ana Valdez NP [facility]        Today's Diagnoses     Hyperlipidemia LDL goal <100    -  1    Benign essential hypertension          Care Instructions      Results for orders placed or performed in visit on 04/13/18   XR CHEST 2 VW (Clinic Performed)    Narrative    PROCEDURE:  XR CHEST 2 VW    HISTORY:  smoker, HTN; Benign essential hypertension; Tobacco abuse.     COMPARISON:  None.    FINDINGS:   The cardiac silhouette is normal in size. The pulmonary vasculature is  normal.  The lungs are clear. No pleural effusion or pneumothorax.      Impression    IMPRESSION:  No acute cardiopulmonary disease.      COOPER MILLER MD       4/13/18 12:01 PM [id number]    Component Results   Component Value Flag Ref Range Units Status Collected Lab   Cholesterol 229 (H) <200 mg/dL Final 04/13/2018 12:01 PM HI   Comment:   Desirable:       <200 mg/dl   Triglycerides 174 (H) <150 mg/dL Final 04/13/2018 12:01 PM HI   Comment:   Borderline high:  150-199 mg/dl   High:             200-499 mg/dl   Very high:       >499 mg/dl   Non Fasting      HDL Cholesterol 38 (L) >49 mg/dL Final 04/13/2018 12:01 PM HI   LDL Cholesterol Calculated 156 (H) <100 mg/dL Final 04/13/2018 12:01 PM HI   Comment:   Above desirable:  100-129 mg/dl   Borderline High:  130-159 mg/dL   High:             160-189 mg/dL   Very high:       >189 mg/dl      Non HDL Cholesterol 191 (H) <130 mg/dL Final 04/13/2018 12:01 PM HI   Comment:   Above Desirable:  130-159 mg/dl   Borderline high:  160-189 mg/dl   High:             190-219 mg/dl   Very high:       >219 mg/dl      4/13/18 12:01 PM [id number]    Component Results   Component Value Flag Ref Range Units Status Collected Lab   TSH 5.58 (H) 0.40 - 4.00 mU/L Final 04/13/2018 12:01 PM       4/13/18 12:01 PM [id number]    Component Results   Component Value Flag Ref Range Units Status Collected Lab   Sodium 139  133 - 144 mmol/L Final 04/13/2018 12:01 PM HI   Potassium 4.1  3.4 - 5.3 mmol/L Final 04/13/2018 12:01 PM HI   Chloride 105  94 - 109 mmol/L Final 04/13/2018 12:01 PM HI   Carbon Dioxide 29  20 - 32 mmol/L Final 04/13/2018 12:01 PM HI   Anion Gap 5  3 - 14 mmol/L Final 04/13/2018 12:01 PM HI   Glucose 98  70 - 99 mg/dL Final 04/13/2018 12:01 PM HI   Comment:   Non Fasting   Urea Nitrogen 9  7 - 30 mg/dL Final 04/13/2018 12:01 PM HI   Creatinine 0.81  0.52 - 1.04 mg/dL Final 04/13/2018 12:01 PM HI   GFR Estimate 71  >60 mL/min/1.7m2 Final 04/13/2018 12:01 PM HI   Comment:   Non  GFR Calc   GFR Estimate If Black 86  >60 mL/min/1.7m2 Final 04/13/2018 12:01 PM HI   Comment:   African American GFR Calc   Calcium 9.6  8.5 - 10.1 mg/dL Final 04/13/2018 12:01 PM HI   Bilirubin Total 0.8  0.2 - 1.3 mg/dL Final 04/13/2018 12:01 PM HI   Albumin 4.0  3.4 - 5.0 g/dL Final 04/13/2018 12:01 PM HI   Protein Total 8.9 (H) 6.8 - 8.8 g/dL Final 04/13/2018 12:01 PM HI   Alkaline Phosphatase 93  40 - 150 U/L Final 04/13/2018 12:01 PM HI   ALT 65 (H) 0 - 50 U/L Final 04/13/2018 12:01 PM HI   AST 57 (H) 0 - 45 U/L Final 04/13/2018 12:01 PM HI     4/13/18 12:11 PM [id number]    Component Results   Component Value Flag Ref Range Units Status Collected Lab   Color Urine Yellow    Final 04/13/2018 12:11 PM MT   Appearance Urine Clear    Final 04/13/2018 12:11 PM MT   Glucose Urine Negative  NEG^Negative mg/dL Final 04/13/2018 12:11 PM MT   Bilirubin Urine Negative  NEG^Negative  Final 04/13/2018 12:11 PM MT   Ketones Urine Negative  NEG^Negative mg/dL Final 04/13/2018 12:11 PM MT   Specific Gravity Urine <=1.005  1.003 - 1.035  Final 04/13/2018 12:11 PM MT   Blood Urine Trace (A) NEG^Negative  Final 04/13/2018 12:11 PM MT   pH Urine 6.0  5.0 - 7.0 pH Final 04/13/2018 12:11 PM MT   Protein Albumin Urine  Negative  NEG^Negative mg/dL Final 04/13/2018 12:11 PM MT   Urobilinogen Urine 0.2  0.2 - 1.0 EU/dL Final 04/13/2018 12:11 PM MT   Nitrite Urine Negative  NEG^Negative  Final 04/13/2018 12:11 PM MT   Leukocyte Esterase Urine Negative  NEG^Negative  Final 04/13/2018 12:11 PM MT   Source Midstream Urine    Final 04/13/2018 12:11 PM MT         ASSESSMENT/PLAN:         ASSESSMENT/PLAN:       1. Hyperlipidemia LDL goal <100  - INTERNAL MEDICINE REFERRAL  - Continue plan. of care - Zocor as directed  - Fish oil OTC  - Aspirin as directed    2. Benign essential hypertension - Goal 120's/70's  - INTERNAL MEDICINE REFERRAL  - losartan (COZAAR) 50 MG tablet; Take 1.5 po qd  Dispense: 45 tablet; Refill: 1    3.  Toe pain  - FU with podiatry as needed      FU with me First or second week of August, am fasting      Ana Valdez NP  [facility]    [employer] [place]                Follow-ups after your visit        Additional Services     INTERNAL MEDICINE REFERRAL       Your provider has referred you to: Dr. Aguirre - [facility].  HTN, lipids, multiple CVD risk factors.  Consult only    Please be aware that coverage of these services is subject to the terms and limitations of your health insurance plan.  Call member services at your health plan with any benefit or coverage questions.      Please bring the following to your appointment:  >>   Any x-rays, CTs or MRIs which have been performed.  Contact the facility where they were done to arrange for  prior to your scheduled appointment.   >>   List of current medications   >>   This referral request   >>   Any documents/labs given to you for this referral                  Your next 10 appointments already scheduled     May 03, 2018 11:30 AM CDT   (Arrive by 11:15 AM)   CONSULT with Finn Solorio MD   [facility] ([facility] )    [address]  [place] [address]   [phone]            Jun 06,  "2018  4:00 PM CDT   (Arrive by 3:45 PM)   MA SCREENING DIGITAL BILATERAL with MTMA1   [facility] ([facility] )    [address], [place] [address]   [phone]           Do not use any powder, lotion or deodorant under your arms or on your breast. If you do, we will ask you to remove it before your exam.  Wear comfortable, two-piece clothing.  If you have any allergies, tell your care team.  Bring any previous mammograms from other facilities or have them mailed to the breast center.              Who to contact     If you have questions or need follow up information about today's clinic visit or your schedule please contact [facility] directly at [phone].  Normal or non-critical lab and imaging results will be communicated to you by MyChart, letter or phone within 4 business days after the clinic has received the results. If you do not hear from us within 7 days, please contact the clinic through MyChart or phone. If you have a critical or abnormal lab result, we will notify you by phone as soon as possible.  Submit refill requests through [employer] or call your pharmacy and they will forward the refill request to us. Please allow 3 business days for your refill to be completed.          Additional Information About Your Visit        [employer] Information     [employer] lets you send messages to your doctor, view your test results, renew your prescriptions, schedule appointments and more. To sign up, go to [email or web address] . Click on \"Log in\" on the left side of the screen, which will take you to the Welcome page. Then click on \"Sign up Now\" on the right side of the page.     You will be asked to enter the access code listed below, as well as some personal information. Please follow the directions to create your username and password.     Your access code is: [email or web address]  Expires: 2018 11:47 AM     Your access code will  " "in 90 days. If you need help or a new code, please call your [facility] clinic or [phone].        Care EveryWhere ID     This is your Care EveryWhere ID. This could be used by other organizations to access your [facility] medical records  [id number]        Your Vitals Were     Pulse Temperature Respirations Height Pulse Oximetry BMI (Body Mass Index)    87 98  F (36.7  C) (Tympanic) 18 5' 2\" (1.575 m) 91% 35.48 kg/m2       Blood Pressure from Last 3 Encounters:   04/25/18 158/90   04/13/18 (!) 180/110    Weight from Last 3 Encounters:   04/25/18 194 lb (88 kg)   04/13/18 191 lb 3.2 oz (86.7 kg)              We Performed the Following     INTERNAL MEDICINE REFERRAL          Today's Medication Changes          These changes are accurate as of 4/25/18  2:20 PM.  If you have any questions, ask your nurse or doctor.               These medicines have changed or have updated prescriptions.        Dose/Directions    losartan 50 MG tablet   Commonly known as:  COZAAR   This may have changed:    - how much to take  - how to take this  - when to take this  - additional instructions   Used for:  Benign essential hypertension   Changed by:  Ana Valdez NP        Take 1.5 po qd   Quantity:  45 tablet   Refills:  1         Stop taking these medicines if you haven't already. Please contact your care team if you have questions.     azithromycin 250 MG tablet   Commonly known as:  ZITHROMAX   Stopped by:  Ana Valdez NP                Where to get your medicines      These medications were sent to [facility] - [place], [place] - [address]  [facility] [place] [address]     Phone:  [phone]     losartan 50 MG tablet                Primary Care Provider Office Phone # Fax #    Ana Valdez [phone] [address]  [place] [address]        Equal Access to Services     CLAUDIA COOL AH: Hadii aad ku hadasho Soomaali, waaxda luqadaha, qaybta kaalmada adeegyatianna, chandrika martines " la'adisn wagner. So [facility] [phone].    ATENCIÓN: Si habla alo, tiene a roy disposición servicios gratuitos de asistencia lingüística. Flavia yip [phone].    We comply with applicable federal civil rights laws and [place] laws. We do not discriminate on the basis of race, color, national origin, age, disability, sex, sexual orientation, or gender identity.            Thank you!     Thank you for choosing [facility]  for your care. Our goal is always to provide you with excellent care. Hearing back from our patients is one way we can continue to improve our services. Please take a few minutes to complete the written survey that you may receive in the mail after your visit with us. Thank you!             Your Updated Medication List - Protect others around you: Learn how to safely use, store and throw away your medicines at www.disposemymeds.org.          This list is accurate as of 4/25/18  2:20 PM.  Always use your most recent med list.                   Brand Name Dispense Instructions for use Diagnosis    aspirin 81 MG EC tablet     90 tablet    Take 1 tablet (81 mg) by mouth daily    Benign essential hypertension       cholecalciferol 5000 units Tabs tablet    vitamin D3    90 tablet    Take 1 tablet (5,000 Units) by mouth daily    Encounter to establish care       DAILY MULTIPLE VITAMIN/IRON Tabs     90 tablet    Take 1 tablet by mouth daily    Encounter to establish care       losartan 50 MG tablet    COZAAR    45 tablet    Take 1.5 po qd    Benign essential hypertension       nicotine 21 MG/24HR 24 hr patch    NICODERM CQ    28 patch    Place 1 patch onto the skin every 24 hours    Tobacco abuse       omega 3 1000 MG Caps     90 capsule    3 po daily    Encounter to establish care, Benign essential hypertension       simvastatin 20 MG tablet    ZOCOR    90 tablet    Take 1 tablet (20 mg) by mouth At Bedtime    Hyperlipidemia LDL goal <100

## 2018-04-25 NOTE — NURSING NOTE
"Chief Complaint   Patient presents with     URI       Initial BP (!) 170/100 (BP Location: Left arm, Patient Position: Sitting, Cuff Size: Adult Regular)  Pulse 87  Temp 98  F (36.7  C) (Tympanic)  Resp 18  Ht 5' 2\" (1.575 m)  Wt 194 lb (88 kg)  SpO2 91%  BMI 35.48 kg/m2 Estimated body mass index is 35.48 kg/(m^2) as calculated from the following:    Height as of this encounter: 5' 2\" (1.575 m).    Weight as of this encounter: 194 lb (88 kg).  Medication Reconciliation: complete   Valerie Bhardwaj MA    "

## 2018-04-25 NOTE — PROGRESS NOTES
SUBJECTIVE:                                                    Aga Gary is a 67 year old female who presents to clinic today for the following health issues:      Hyperlipidemia Follow-Up   -   Rate your low fat/cholesterol diet?: poor    Taking statin?  Yes, no muscle aches from statin    Other lipid medications/supplements?:  none      Hypertension Follow-up    Outpatient blood pressures are not being checked.    Low Salt Diet: not monitoring salt      Follow up URI - was treated with Zithromax for a UTI  -Was seen on 4/13/18 for URI . Pt states symptoms have improved. She is starting to be able to smell again and her breathing is easier. Continues to have productive cough with yellow sputum and cream colored nasal discharge but this is much decreased as well and patient states she feels she is doing fine.        FU toe pain  Saw Dr. Toro for an ingrown toenail - he removed an ingrowing nail, she feels very well.             Amount of exercise or physical activity: 2-3 days/week for an average of 15-30 minutes    Problems taking medications regularly: No    Medication side effects: none    Diet: regular (no restrictions)    Problem list and histories reviewed & adjusted, as indicated.  Additional history: as documented    Patient Active Problem List   Diagnosis     Benign essential hypertension     Tobacco abuse     Hyperlipidemia LDL goal <100     Taking a statin medication     Past Surgical History:   Procedure Laterality Date     ABDOMEN SURGERY      2 c-sections     CHOLECYSTECTOMY  1994     GYN SURGERY      total hyst,, no cervix     HC REMOVAL OF NAIL PLATE SIMPLE SINGLE  04/19/2018    removal of 1/4  left gt toenail, local      HEAD & NECK SURGERY      tumor neck, benign     ORTHOPEDIC SURGERY Bilateral     carpul tunnel        Social History   Substance Use Topics     Smoking status: Light Tobacco Smoker     Types: Cigarettes     Smokeless tobacco: Never Used      Comment: pt is slowly  "cutting down     Alcohol use No     Family History   Problem Relation Age of Onset     Other Cancer Mother      Other Cancer Brother      Breast Cancer Paternal Aunt          Current Outpatient Prescriptions   Medication Sig Dispense Refill     aspirin 81 MG EC tablet Take 1 tablet (81 mg) by mouth daily 90 tablet 3     cholecalciferol (VITAMIN D3) 5000 units TABS tablet Take 1 tablet (5,000 Units) by mouth daily 90 tablet 11     losartan (COZAAR) 50 MG tablet Take 1 tablet (50 mg) by mouth daily 30 tablet 1     Multiple Vitamins-Iron (DAILY MULTIPLE VITAMIN/IRON) TABS Take 1 tablet by mouth daily 90 tablet 11     nicotine (NICODERM CQ) 21 MG/24HR 24 hr patch Place 1 patch onto the skin every 24 hours 28 patch 5     omega 3 1000 MG CAPS 3 po daily 90 capsule 11     simvastatin (ZOCOR) 20 MG tablet Take 1 tablet (20 mg) by mouth At Bedtime 90 tablet 1     No Known Allergies  BP Readings from Last 3 Encounters:   04/25/18 158/90   04/13/18 (!) 180/110    Wt Readings from Last 3 Encounters:   04/25/18 194 lb (88 kg)   04/13/18 191 lb 3.2 oz (86.7 kg)                  Labs reviewed in EPIC    ROS:  Constitutional, HEENT, cardiovascular, pulmonary, GI, , musculoskeletal, neuro, skin, endocrine and psych systems are negative, except as otherwise noted.    OBJECTIVE:     /90 (BP Location: Left arm, Patient Position: Chair, Cuff Size: Adult Large)  Pulse 87  Temp 98  F (36.7  C) (Tympanic)  Resp 18  Ht 5' 2\" (1.575 m)  Wt 194 lb (88 kg)  SpO2 91%  BMI 35.48 kg/m2  Body mass index is 35.48 kg/(m^2).     GENERAL: healthy, alert and no distress  EYES: Eyes grossly normal to inspection, PERRL and conjunctivae and sclerae normal  HENT: ear canals and TM's normal, nose and mouth without ulcers or lesions  NECK: no adenopathy, no asymmetry, masses, or scars and thyroid normal to palpation  RESP: lungs clear to auscultation - no rales, rhonchi or wheezes  CV: regular rate and rhythm, normal S1 S2, no S3 or S4, no " murmur, click or rub, no peripheral edema and peripheral pulses strong  ABDOMEN: soft, nontender, no hepatosplenomegaly, no masses and bowel sounds normal  MS: no gross musculoskeletal defects noted, no edema  SKIN: no suspicious lesions or rashes  NEURO: Normal strength and tone, mentation intact and speech normal  LYMPH: no cervical, supraclavicular, axillary, or inguinal adenopathy      Colon consult and Mammo are scheduled      Diagnostic Test Results:  Results for orders placed or performed in visit on 04/13/18   XR CHEST 2 VW (Clinic Performed)    Narrative    PROCEDURE:  XR CHEST 2 VW    HISTORY:  smoker, HTN; Benign essential hypertension; Tobacco abuse.     COMPARISON:  None.    FINDINGS:   The cardiac silhouette is normal in size. The pulmonary vasculature is  normal.  The lungs are clear. No pleural effusion or pneumothorax.      Impression    IMPRESSION:  No acute cardiopulmonary disease.      COOPER MILLER MD       4/13/18 12:01 PM [id number]    Component Results   Component Value Flag Ref Range Units Status Collected Lab   Cholesterol 229 (H) <200 mg/dL Final 04/13/2018 12:01 PM HI   Comment:   Desirable:       <200 mg/dl   Triglycerides 174 (H) <150 mg/dL Final 04/13/2018 12:01 PM HI   Comment:   Borderline high:  150-199 mg/dl   High:             200-499 mg/dl   Very high:       >499 mg/dl   Non Fasting      HDL Cholesterol 38 (L) >49 mg/dL Final 04/13/2018 12:01 PM HI   LDL Cholesterol Calculated 156 (H) <100 mg/dL Final 04/13/2018 12:01 PM HI   Comment:   Above desirable:  100-129 mg/dl   Borderline High:  130-159 mg/dL   High:             160-189 mg/dL   Very high:       >189 mg/dl      Non HDL Cholesterol 191 (H) <130 mg/dL Final 04/13/2018 12:01 PM HI   Comment:   Above Desirable:  130-159 mg/dl   Borderline high:  160-189 mg/dl   High:             190-219 mg/dl   Very high:       >219 mg/dl      4/13/18 12:01 PM [id number]    Component Results   Component Value Flag Ref Range Units Status Collected  Lab   TSH 5.58 (H) 0.40 - 4.00 mU/L Final 04/13/2018 12:01 PM      4/13/18 12:01 PM [id number]    Component Results   Component Value Flag Ref Range Units Status Collected Lab   Sodium 139  133 - 144 mmol/L Final 04/13/2018 12:01 PM HI   Potassium 4.1  3.4 - 5.3 mmol/L Final 04/13/2018 12:01 PM HI   Chloride 105  94 - 109 mmol/L Final 04/13/2018 12:01 PM HI   Carbon Dioxide 29  20 - 32 mmol/L Final 04/13/2018 12:01 PM HI   Anion Gap 5  3 - 14 mmol/L Final 04/13/2018 12:01 PM HI   Glucose 98  70 - 99 mg/dL Final 04/13/2018 12:01 PM HI   Comment:   Non Fasting   Urea Nitrogen 9  7 - 30 mg/dL Final 04/13/2018 12:01 PM HI   Creatinine 0.81  0.52 - 1.04 mg/dL Final 04/13/2018 12:01 PM HI   GFR Estimate 71  >60 mL/min/1.7m2 Final 04/13/2018 12:01 PM HI   Comment:   Non  GFR Calc   GFR Estimate If Black 86  >60 mL/min/1.7m2 Final 04/13/2018 12:01 PM HI   Comment:   African American GFR Calc   Calcium 9.6  8.5 - 10.1 mg/dL Final 04/13/2018 12:01 PM HI   Bilirubin Total 0.8  0.2 - 1.3 mg/dL Final 04/13/2018 12:01 PM HI   Albumin 4.0  3.4 - 5.0 g/dL Final 04/13/2018 12:01 PM HI   Protein Total 8.9 (H) 6.8 - 8.8 g/dL Final 04/13/2018 12:01 PM HI   Alkaline Phosphatase 93  40 - 150 U/L Final 04/13/2018 12:01 PM HI   ALT 65 (H) 0 - 50 U/L Final 04/13/2018 12:01 PM HI   AST 57 (H) 0 - 45 U/L Final 04/13/2018 12:01 PM HI     4/13/18 12:11 PM [id number]    Component Results   Component Value Flag Ref Range Units Status Collected Lab   Color Urine Yellow    Final 04/13/2018 12:11 PM MT   Appearance Urine Clear    Final 04/13/2018 12:11 PM MT   Glucose Urine Negative  NEG^Negative mg/dL Final 04/13/2018 12:11 PM MT   Bilirubin Urine Negative  NEG^Negative  Final 04/13/2018 12:11 PM MT   Ketones Urine Negative  NEG^Negative mg/dL Final 04/13/2018 12:11 PM MT   Specific Gravity Urine <=1.005  1.003 - 1.035  Final 04/13/2018 12:11 PM MT   Blood Urine Trace (A) NEG^Negative  Final 04/13/2018 12:11 PM MT   pH Urine 6.0  5.0 - 7.0  pH Final 04/13/2018 12:11 PM MT   Protein Albumin Urine Negative  NEG^Negative mg/dL Final 04/13/2018 12:11 PM MT   Urobilinogen Urine 0.2  0.2 - 1.0 EU/dL Final 04/13/2018 12:11 PM MT   Nitrite Urine Negative  NEG^Negative  Final 04/13/2018 12:11 PM MT   Leukocyte Esterase Urine Negative  NEG^Negative  Final 04/13/2018 12:11 PM MT   Source Midstream Urine    Final 04/13/2018 12:11 PM MT         ASSESSMENT/PLAN:       1. Hyperlipidemia LDL goal <100  - INTERNAL MEDICINE REFERRAL  - Continue plan. of care - Zocor as directed  - Fish oil OTC  - Aspirin as directed    2. Benign essential hypertension - Goal 120's/70's  - INTERNAL MEDICINE REFERRAL  - losartan (COZAAR) 50 MG tablet; Take 1.5 po qd  Dispense: 45 tablet; Refill: 1    3.  Toe pain  - FU with podiatry as needed      FU with me First or second week of August, am fasting      Ana Valdez NP  [facility] [place]

## 2018-04-25 NOTE — PATIENT INSTRUCTIONS
Results for orders placed or performed in visit on 04/13/18   XR CHEST 2 VW (Clinic Performed)    Narrative    PROCEDURE:  XR CHEST 2 VW    HISTORY:  smoker, HTN; Benign essential hypertension; Tobacco abuse.     COMPARISON:  None.    FINDINGS:   The cardiac silhouette is normal in size. The pulmonary vasculature is  normal.  The lungs are clear. No pleural effusion or pneumothorax.      Impression    IMPRESSION:  No acute cardiopulmonary disease.      COOPER MILLER MD       4/13/18 12:01 PM [id number]    Component Results   Component Value Flag Ref Range Units Status Collected Lab   Cholesterol 229 (H) <200 mg/dL Final 04/13/2018 12:01 PM HI   Comment:   Desirable:       <200 mg/dl   Triglycerides 174 (H) <150 mg/dL Final 04/13/2018 12:01 PM HI   Comment:   Borderline high:  150-199 mg/dl   High:             200-499 mg/dl   Very high:       >499 mg/dl   Non Fasting      HDL Cholesterol 38 (L) >49 mg/dL Final 04/13/2018 12:01 PM HI   LDL Cholesterol Calculated 156 (H) <100 mg/dL Final 04/13/2018 12:01 PM HI   Comment:   Above desirable:  100-129 mg/dl   Borderline High:  130-159 mg/dL   High:             160-189 mg/dL   Very high:       >189 mg/dl      Non HDL Cholesterol 191 (H) <130 mg/dL Final 04/13/2018 12:01 PM HI   Comment:   Above Desirable:  130-159 mg/dl   Borderline high:  160-189 mg/dl   High:             190-219 mg/dl   Very high:       >219 mg/dl      4/13/18 12:01 PM [id number]    Component Results   Component Value Flag Ref Range Units Status Collected Lab   TSH 5.58 (H) 0.40 - 4.00 mU/L Final 04/13/2018 12:01 PM      4/13/18 12:01 PM [id number]    Component Results   Component Value Flag Ref Range Units Status Collected Lab   Sodium 139  133 - 144 mmol/L Final 04/13/2018 12:01 PM HI   Potassium 4.1  3.4 - 5.3 mmol/L Final 04/13/2018 12:01 PM HI   Chloride 105  94 - 109 mmol/L Final 04/13/2018 12:01 PM HI   Carbon Dioxide 29  20 - 32 mmol/L Final 04/13/2018 12:01 PM HI   Anion Gap 5  3 - 14 mmol/L Final  04/13/2018 12:01 PM HI   Glucose 98  70 - 99 mg/dL Final 04/13/2018 12:01 PM HI   Comment:   Non Fasting   Urea Nitrogen 9  7 - 30 mg/dL Final 04/13/2018 12:01 PM HI   Creatinine 0.81  0.52 - 1.04 mg/dL Final 04/13/2018 12:01 PM HI   GFR Estimate 71  >60 mL/min/1.7m2 Final 04/13/2018 12:01 PM HI   Comment:   Non  GFR Calc   GFR Estimate If Black 86  >60 mL/min/1.7m2 Final 04/13/2018 12:01 PM HI   Comment:   African American GFR Calc   Calcium 9.6  8.5 - 10.1 mg/dL Final 04/13/2018 12:01 PM HI   Bilirubin Total 0.8  0.2 - 1.3 mg/dL Final 04/13/2018 12:01 PM HI   Albumin 4.0  3.4 - 5.0 g/dL Final 04/13/2018 12:01 PM HI   Protein Total 8.9 (H) 6.8 - 8.8 g/dL Final 04/13/2018 12:01 PM HI   Alkaline Phosphatase 93  40 - 150 U/L Final 04/13/2018 12:01 PM HI   ALT 65 (H) 0 - 50 U/L Final 04/13/2018 12:01 PM HI   AST 57 (H) 0 - 45 U/L Final 04/13/2018 12:01 PM HI     4/13/18 12:11 PM [id number]    Component Results   Component Value Flag Ref Range Units Status Collected Lab   Color Urine Yellow    Final 04/13/2018 12:11 PM MT   Appearance Urine Clear    Final 04/13/2018 12:11 PM MT   Glucose Urine Negative  NEG^Negative mg/dL Final 04/13/2018 12:11 PM MT   Bilirubin Urine Negative  NEG^Negative  Final 04/13/2018 12:11 PM MT   Ketones Urine Negative  NEG^Negative mg/dL Final 04/13/2018 12:11 PM MT   Specific Gravity Urine <=1.005  1.003 - 1.035  Final 04/13/2018 12:11 PM MT   Blood Urine Trace (A) NEG^Negative  Final 04/13/2018 12:11 PM MT   pH Urine 6.0  5.0 - 7.0 pH Final 04/13/2018 12:11 PM MT   Protein Albumin Urine Negative  NEG^Negative mg/dL Final 04/13/2018 12:11 PM MT   Urobilinogen Urine 0.2  0.2 - 1.0 EU/dL Final 04/13/2018 12:11 PM MT   Nitrite Urine Negative  NEG^Negative  Final 04/13/2018 12:11 PM MT   Leukocyte Esterase Urine Negative  NEG^Negative  Final 04/13/2018 12:11 PM MT   Source Midstream Urine    Final 04/13/2018 12:11 PM MT         ASSESSMENT/PLAN:         ASSESSMENT/PLAN:       1.  Hyperlipidemia LDL goal <100  - INTERNAL MEDICINE REFERRAL  - Continue plan. of care - Zocor as directed  - Fish oil OTC  - Aspirin as directed    2. Benign essential hypertension - Goal 120's/70's  - INTERNAL MEDICINE REFERRAL  - losartan (COZAAR) 50 MG tablet; Take 1.5 po qd  Dispense: 45 tablet; Refill: 1    3.  Toe pain  - FU with podiatry as needed      FU with me First or second week of August, am fasting      Ana Valdez NP  [facility]    [employer]

## 2018-04-26 ASSESSMENT — PATIENT HEALTH QUESTIONNAIRE - PHQ9: SUM OF ALL RESPONSES TO PHQ QUESTIONS 1-9: 5

## 2018-04-26 ASSESSMENT — ANXIETY QUESTIONNAIRES: GAD7 TOTAL SCORE: 10

## 2018-05-03 ENCOUNTER — OFFICE VISIT (OUTPATIENT)
Dept: SURGERY | Facility: OTHER | Age: 68
End: 2018-05-03
Attending: NURSE PRACTITIONER
Payer: COMMERCIAL

## 2018-05-03 VITALS
WEIGHT: 192.2 LBS | HEIGHT: 62 IN | RESPIRATION RATE: 18 BRPM | TEMPERATURE: 98 F | OXYGEN SATURATION: 94 % | DIASTOLIC BLOOD PRESSURE: 88 MMHG | BODY MASS INDEX: 35.37 KG/M2 | HEART RATE: 80 BPM | SYSTOLIC BLOOD PRESSURE: 145 MMHG

## 2018-05-03 DIAGNOSIS — Z87.891 HISTORY OF TOBACCO USE: Primary | ICD-10-CM

## 2018-05-03 DIAGNOSIS — Z12.11 ENCOUNTER FOR SCREENING COLONOSCOPY: ICD-10-CM

## 2018-05-03 PROCEDURE — G0463 HOSPITAL OUTPT CLINIC VISIT: HCPCS | Mod: 25

## 2018-05-03 PROCEDURE — G0463 HOSPITAL OUTPT CLINIC VISIT: HCPCS

## 2018-05-03 PROCEDURE — G0296 VISIT TO DETERM LDCT ELIG: HCPCS | Performed by: SURGERY

## 2018-05-03 PROCEDURE — 99204 OFFICE O/P NEW MOD 45 MIN: CPT | Performed by: SURGERY

## 2018-05-03 ASSESSMENT — PAIN SCALES - GENERAL: PAINLEVEL: NO PAIN (0)

## 2018-05-03 NOTE — PATIENT INSTRUCTIONS
You will be contacted by [facility] radiology to schedule your CT scan. appointment. Please call the nurse at [phone] if you are not contacted in a timely manner.       Guide to your Colonoscopy with GOLYTELY preparation      Date of Procedure 5/30/18 with Dr. Solorio    Admit time: [facility] will call you about 1 day before your procedure by 5pm with your admit time.         YOUR UPCOMING COLONOSCOPY    At [facility], we want to make sure that your colonoscopy is as pleasant as possible. This guide is designed to answer any questions you might have and to walk you through the preparations you will need to make before your procedure.    Should you have additional questions, please feel free to contact us. Contact numbers are listed below. Thank you for choosing [facility].    Important Numbers    Clinic Health Unit Coordinator: [phone]  Clinic Nurse: [phone] (or [phone]; [phone])     Your procedure will be at the [facility] in [facility] [address] [facility] [address]  [place] () [phone]  Fax Number [phone]      All nursing questions or concerns can be directed to the clinic nurse    If you have a scheduling or appointment question, or need to postoone your procedure, please call the Health Unit Coordinator between 8am and 4pm Monday through Friday.      SPECIAL INSTRUCTIONS: (Diabetes, blood thinners)       hold aspirin for 7 days prior to procedure          COLONOSCOPY PREP    7 DAYS BEFORE THE EXAM:     Do not take Aspirin or other NSAIDS (Ibuprofen, Motrin, Aleve, Celebrex, Naproxen, etc) 7 days before your surgery. Tylenol is fine.    If you are prescribed blood thinners (Aspirin, Coumadin/Warfarin, Plavix, etc) talk to your provider.    Stop taking fiber supplements, vitamins, iron or multivitamins that contain iron.    If you are a diabetic and take medications to control your blood sugar, follow the  special instructions listed or talk to your provider.     Arrange transportation with a family member or friend to drive you home and have an adult available to stay with you for the next 4 hours when you arrive home for your safety. If you need to take a taxi or the bus, you MUST have a responsible adult to ride with you OR YOUR PROCEDURE WILL BE CANCELLED. It is recommended that you DO NOT DRIVE for the next 24 hours after receiving anesthesia.      prescriptions at your pharmacy. If it has been more than one week since your appointment was scheduled, please call your pharmacy to verify it is still ready for .     Call the office if you should become ill within 1 week of your procedure and we will reschedule it when you are healthy. This includes sings or symptoms of a cold or the flu. This can include fever, chills, sore throat, cough, chest congestions, productive cough, runny nose.     1 DAY BEFORE THE EXAM:    DO NOT EAT ANY SOLID FOOD OR MILK PRODUCTS AFTER 12:00 AM (MIDNIGHT).  Drink only clear liquids for breakfast, lunch and dinner. (No red or purple colors as these colors can be mistaken for blood.)  Clear liquids include water, juices without pulp, soft drinks, broth, bouillon, black coffee without cream, tea, Sandro-Aid, Gatorade, Jell-O and popsicles. No red or purple colors.  Follow the instructions of your colon preparation.         CLEAR LIQUID DIET    You may have:  Tea, coffee (no cream)  Water, vitamin water, smart water, coconut water, PowerAde, Propel, Soda that is not red or purple (Sprite, 7-up, ginger ale), Gatorade that is not red or purple  Clear nutrition drinks (Resource Breeze, Ensure Active protein drink peach flavor)  Jell-O (not red or purple), Popsicles (without milk or fruit pieces and not red or purple), Indonesian ice (not red or purple)  Fat-free broth or bouillon  Plain hard candy such as life savers (not red or purple)  Powdered lemonade such as Crystal Light or  "Country Time  Clear juices and fruit-flavored drinks such as apple juice, white grape juice, Hi-C and Sandro-Aid (not red or purple)  Honey, sugar    Do not have:  Milk or milk products such as ice cream, malts, or shakes  Red or purple drinks of any kind  Cranberry or grape juice  Red or purple Jell-O, popsicles, Sandro-Aid, Sorber and candy  Orange, grapefruit, pineapple or tomato juices or juices with pulp  Cream soups of any kind  Alcohol    BETWEEN 4:00PM and 6:00PM PRIOR TO EXAM:    Drink one 8 ounce glass every 10 minutes until gone. Drink each glass quickly rather than sipping.  If the liquid is too salty, you may use a straw. Drink the whole gallon of Golytely. You must complete the entire gallon as directed.  You will be passing light yellow or clear watery stool.       DAY OF COLONOSCOPY PROCEDURE:     You many have clear liquids up until 3 hours before you check in at admitting.  Wear comfortable clothes. No jewelry, body piercings, make-up, nail polish, hair spray, lotions, perfumes or colognes. Shower before you arrive.  Take blood pressure and heart medications as usual with a sip of water.  [place] in Admitting through the [place] [facility].  You must have a  with you and and adult available to stay with your for 4 hours at home. The medicine used in this test will make you sleepy. If you do not have someone, please reschedule or your test will be cancelled.  It is recommended that you do not drive for 24 hours after your test. Do not operate power equipment, drink alcoholic beverages, make important decisions or sign legal documents.     COLONOSCOPY FREQUENTLY ASKED QUESTIONS    What is a colonoscopy?    A colonoscopy is a test to look at the lining of your large intestine. The purpose of the exam is to check for abnormalities including growths called \"polyps\" that can lead to serious disease. A flexibles scope is inserted into your rectum by the doctor to examine your large intestine.    What are " polyps?  Polyps are abnormal growths on the lining of the colon. Most polyps are not cancerous, but some polyps have the potential to turn into cancer with time. Polyps can also bleed. For these reasons, most polyps are removed during a colonoscopy and sent to the laboratory for microscopic examination.    What preparation is needed?  The colon must be completely clean for the procedure to be performed. You may be given one or two different prep solutions to cleanse your bowel. You will also need to follow a clear liquid diet the day before your procedure.    What happens after the procedure?  After your procedure is complete, you will be taken back to your day surgery room where you will be monitored for approximately 1 hour. You can expect to feel drowsy for several hours afterward. You may experience some cramping or bloating due to the air introduced into your colon during the exam. You will not be able to drive or operate machinery the rest of the day. You will be given written discharge instructions and appropriate learning material before you go home. You must have an adult to stay at home with you for the next 4 hours after you leave the hospital for your safety.    When will I find out the results of my test?  Your surgeon will talk to you and your designated  before you leave and usually the preliminary results can be given to you at that time. If a biopsy was taken during your procedure, it will be sent to the laboratory for examination. Results usually take one week. You will be contacted by phone or by letter with results.      TIPS FOR COLON CLEANSING BEFORE YOUR COLONOSCOPY    To get accurate results from your exam, your colon must be completely clean and empty. Please follow your doctor's instructions. If you do not, you may need to repeat both the exam and colon-cleansing process.    The medicine you take may cause bloating, nausea and other discomfort. Follow these tips to make the process as  easy as possible:     You may use alcohol-free baby wipes to ease anal irritation. You may also use Vaseline to help protect the skin. Other options include Tucks wipes, hemorrhoid treatments and hydrocortisone cream.     You may wish to squeeze some lemon juice into your preparation or add a packet of Crystal Light lemon-lime or ice tea flavor. (remember to not use red or purple)    To chill the solution, put it in your refrigerator or set it in a bowl of ice. Do not add ice in your drinking glass. You may remove the colon preparation from the refrigerator 15-30 minutes before drinking.    Quickly drink one whole glass every 5 to 10 minutes. It may help to use a timer. If the liquid is too salty, use a straw.    Stay near a toilet!    You will have diarrhea (loose watery stools) and may also have chills. Dress for comfort.    Expect to feel discomfort until the stool clears from your colon. This usually takes about 2 to 4 hours.    Even when you are sitting on the toilet, keep drinking a glass of solution every 10-15 minutes.    If you have nausea or vomiting, rinse your mouth with water. Take a break for 15 to 30 minutes, and then keep drinking the solution.    Some people find it helpful to suck on a wedge of lemon or lime. You may also try sucking on hard candy (not red or purple) or washing your mouth out with water, clear soda or mouthwash.    If you followed your doctor's orders and your stool is a clear or yellow liquid, you are ready for the exam.    If you are not sure if your colon is clean, please call your clinic and ask to speak to a nurse.       Lung Cancer Screening   Frequently Asked Questions  If you are at high-risk for lung cancer, getting screened with low-dose computed tomography (LDCT) every year can help save your life. This handout offers answers to some of the most common questions about lung cancer screening. If you have other questions, please call 6-939-3-PCancer ([phone]).      What is it?  Lung cancer screening uses special X-ray technology to create an image of your lung tissue. The exam is quick and easy and takes less than 10 seconds. We don t give you any medicine or use any needles. You can eat before and after the exam. You don t need to change your clothes as long as the clothing on your chest doesn t contain metal. But, you do need to be able to hold your breath for at least 6 seconds during the exam.    What is the goal of lung cancer screening?  The goal of lung cancer screening is to save lives. Many times, lung cancer is not found until a person starts having physical symptoms. Lung cancer screening can help detect lung cancer in the earliest stages when it may be easier to treat.    Who should be screened for lung cancer?  We suggest lung cancer screening for anyone who is at high-risk for lung cancer. You are in the high-risk group if you:      are between the ages of 55 and 79, and    have smoked at least 1 pack of cigarettes a day for 30 or more years, and    still smoke or have quit within the past 15 years.    However, if you have a new cough or shortness of breath, you should talk to your doctor before being screened.    Some national lung health advocacy groups also recommend screening for people ages 50 to 79 who have smoked an average of 1 pack of cigarettes a day for 20 years. They must also have at least 1 other risk factor for lung cancer, not including exposure to secondhand smoke. Other risk factors are having had cancer in the past, emphysema, pulmonary fibrosis, COPD, a family history of lung cancer, or exposure to certain materials such as arsenic, asbestos, beryllium, cadmium, chromium, diesel fumes, nickel, radon or silica. Your care team can help you know if you have one of these risk factors.     Why does it matter if I have symptoms?  Certain symptoms can be a sign that you have a condition in your lungs that should be checked and treated by your  doctor. These symptoms include fever, chest pain, a new or changing cough, shortness of breath that you have never felt before, coughing up blood or unexplained weight loss. Having any of these symptoms can greatly affect the results of lung cancer screening.       Should all smokers get an LDCT lung cancer screening exam?  It depends. Lung cancer screening is for a very specific group of men and women who have a history of heavy smoking over a long period of time (see  Who should be screened for lung cancer  above).  I am in the high-risk group, but have been diagnosed with cancer in the past. Is LDCT lung cancer screening right for me?  In some cases, you should not have LDCT lung screening, such as when your doctor is already following your cancer with CT scan studies. Your doctor will help you decide if LDCT lung screening is right for you.  Do I need to have a screening exam every year?  Yes. If you are in the high-risk group described earlier, you should get an LDCT lung cancer screening exam every year until you are 79, or are no longer willing or able to undergo screening and possible procedures to diagnose and treat lung cancer.  How effective is LDCT at preventing death from lung cancer?  Studies have shown that LDCT lung cancer screening can lower the risk of death from lung cancer by 20 percent in people who are at high-risk.  What are the risks?  There are some risks and limitations of LDCT lung cancer screening. We want to make sure you understand the risks and benefits, so please let us know if you have any questions. Your doctor may want to talk with you more about these risks.    Radiation exposure: As with any exam that uses radiation, there is a very small increased risk of cancer. The amount of radiation in LDCT is small--about the same amount a person would get from a mammogram. Your doctor orders the exam when he or she feels the potential benefits outweigh the risks.    False negatives: No  test is perfect, including LDCT. It is possible that you may have a medical condition, including lung cancer, that is not found during your exam. This is called a false negative result.    False positives and more testing: LDCT very often finds something in the lung that could be cancer, but in fact is not. This is called a false positive result. False positive tests often cause anxiety. To make sure these findings are not cancer, you may need to have more tests. These tests will be done only if you give us permission. Sometimes patients need a treatment that can have side effects, such as a biopsy. For more information on false positives, see  What can I expect from the results?     Findings not related to lung cancer: Your LDCT exam also takes pictures of areas of your body next to your lungs. In a very small number of cases, the CT scan will show an abnormal finding in one of these areas, such as your kidneys, adrenal glands, liver or thyroid. This finding may not be serious, but you may need more tests. Your doctor can help you decide what other tests you may need, if any.  What can I expect from the results?  About 1 out of 4 LDCT exams will find something that may need more tests. Most of the time, these findings are lung nodules. Lung nodules are very small collections of tissue in the lung. These nodules are very common, and the vast majority--more than 97 percent--are not cancer (benign). Most are normal lymph nodes or small areas of scarring from past infections.  But, if a small lung nodule is found to be cancer, the cancer can be cured more than 90 percent of the time. To know if the nodule is cancer, we may need to get more images before your next yearly screening exam. If the nodule has suspicious features (for example, it is large, has an odd shape or grows over time), we will refer you to a specialist for further testing.  Will my doctor also get the results?  Yes. Your doctor will get a copy of your  results.  Is it okay to keep smoking now that there s a cancer screening exam?  No. Tobacco is one of the strongest cancer-causing agents. It causes not only lung cancer, but other cancers and cardiovascular (heart) diseases as well. The damage caused by smoking builds over time. This means that the longer you smoke, the higher your risk of disease. While it is never too late to quit, the sooner you quit, the better.  Where can I find help to quit smoking?  The best way to prevent lung cancer is to stop smoking. If you have already quit smoking, congratulations and keep it up! For help on quitting smoking, please call [facility] at 2-609-758-OWOH (9058) or the American [employer] at 1-833.396.2841 to find local resources near you.  One-on-one health coaching:  If you d prefer to work individually with a health care provider on tobacco cessation, we offer:      Medication Therapy Management:  Our specially trained pharmacists work closely with you and your doctor to help you quit smoking.  Call [phone] or [phone] (toll free).     Can Do: Health coaching offered by [facility].  [email or web address]

## 2018-05-03 NOTE — PROGRESS NOTES
[facility] Surgery Consultation    CC:  Colorectal cancer screening    HPI:  This 67 year old year old female is seen at the request of Ana Valdez for evaluation of colorectal cancer screening.  The history is obtained from the patient, and reviewing the medical record.  She is good medical historian. She states that she has never had a colonoscopy before. She has no family history of colon cancer or polyps. She has not had any abdominal pain or bloating. She has not had any melena or hematochezia. She has no upper GI symptoms.    She says that she currently smokes and is trying to quit. She started smoking around the age of 21 and has done one pack per day.     Past Medical History:   Diagnosis Date     Benign essential hypertension 4/13/2018     Hyperlipidemia LDL goal <100 4/13/2018     Taking a statin medication 4/13/2018     Tobacco abuse 4/13/2018       Past Surgical History:   Procedure Laterality Date     ABDOMEN SURGERY      2 c-sections     CHOLECYSTECTOMY  1994     GYN SURGERY      total hyst,, no cervix     HC REMOVAL OF NAIL PLATE SIMPLE SINGLE  04/19/2018    removal of 1/4  left gt toenail, local      HEAD & NECK SURGERY      tumor neck, benign     ORTHOPEDIC SURGERY Bilateral     carpul tunnel        Pt denied problems with bleeding or anesthesia    Prior to Admission medications    Medication Sig Start Date End Date Taking? Authorizing Provider   aspirin 81 MG EC tablet Take 1 tablet (81 mg) by mouth daily 4/13/18   Ana Valdez NP   cholecalciferol (VITAMIN D3) 5000 units TABS tablet Take 1 tablet (5,000 Units) by mouth daily 4/13/18   Ana Valdez NP   losartan (COZAAR) 50 MG tablet Take 1.5 po qd 4/25/18   Ana Valdez NP   Multiple Vitamins-Iron (DAILY MULTIPLE VITAMIN/IRON) TABS Take 1 tablet by mouth daily 4/13/18   Ana Valdez NP   nicotine (NICODERM CQ) 21 MG/24HR 24 hr patch Place 1 patch onto the skin every 24 hours 4/13/18   Ana Valdez NP   omega 3 1000 MG CAPS 3 po daily 4/13/18    Ana Valdez NP   simvastatin (ZOCOR) 20 MG tablet Take 1 tablet (20 mg) by mouth At Bedtime 4/13/18   Ana Valdez NP        No Known Allergies      HABITS:    Social History   Substance Use Topics     Smoking status: Light Tobacco Smoker     Types: Cigarettes     Smokeless tobacco: Never Used      Comment: pt is slowly cutting down     Alcohol use No     No mood altering drug use.    Family History   Problem Relation Age of Onset     Other Cancer Mother      Other Cancer Brother      Breast Cancer Paternal Aunt        REVIEW OF SYSTEMS:  Ten point review of systems negative except those mentioned in the HPI.     The patient denies sleep apnea, latex allergies or MRSA    OBJECTIVE:    There were no vitals taken for this visit.    GENERAL: Generally appears well, in no distress with appropriate affect.  HEENT:   Sclerae anicteric - No cervical, supra/infraclavicular lymphadenopathy, no thyroid masses  Respiratory:  Lungs with wheeze bilaterally with good air excursion  Cardiovascular:  Regular Rate and Rhythm with no murmurs gallops or rubs, normal   Abdomen: obese, soft, non-tender, non-distended  :  deferred  Extremities:  Extremities normal. No deformities, edema, or skin discoloration.  Skin:  no suspicious lesions or rashes  Neurological: grossly intact    Psych:  Alert, oriented, affect appropriate with normal decision making ability.      IMPRESSION:  67 year old female for colorectal cancer screening and smoking history    PLAN:  A detailed description of the [place] Preventive Task Force development of colorectal cancer screening was had. I described the pathology of the adenoma to carcinoma progression and its genetic changes that occur. I discussed how with colorectal cancer screening by endoscopic surveillance we are able to identify potential malignancies and remove them before they progress along the adenoma to carcinoma pathway. The risks for colon cancer progression were discussed including  first degree relatives with colon cancer, inflammatory bowel disease, smoking, obesity, and diet. I then discussed how there are certain attributes which can decrease the risk of colon cancer such as a healthy diet and physical activity. The patient understood the adenoma to carcinoma sequence, the reasoning for screening at specific intervals, and risk factor modification. I then described the technical portion of the procedure.    The indications, risks, benefits and technical aspects of whole colon colonoscopy were outlined with risks including, but not limited to, perforation, bleeding and inability to visualize entire colon.  Management of each was reviewed.  The need of mechanical preparation of the colon was reviewed along with the use of monitored anesthetic care.  The patient's questions were asked and answered.  Scheduled first available date.    With the patients 40+ pack year of smoking I suggested that she undergo lung cancer screening with a low dose CT scan. All questions and concerns were addressed. We will plan on scheduling her for a colonoscopy and putting in the low dose CT scan.     Thank you for allowing me to participate in the care of your patient.           Finn Solorio MD    5/3/2018  11:27 AM    cc:  Ana Valdez

## 2018-05-03 NOTE — NURSING NOTE
"Chief Complaint   Patient presents with     Consult     colonoscopy        Initial /84 (BP Location: Right arm, Patient Position: Sitting, Cuff Size: Adult Large)  Pulse 80  Temp 98  F (36.7  C) (Tympanic)  Resp 18  Ht 5' 1.5\" (1.562 m)  Wt 192 lb 3.2 oz (87.2 kg)  SpO2 94%  BMI 35.73 kg/m2 Estimated body mass index is 35.73 kg/(m^2) as calculated from the following:    Height as of this encounter: 5' 1.5\" (1.562 m).    Weight as of this encounter: 192 lb 3.2 oz (87.2 kg).  Medication Reconciliation: complete   NEAL DOTSON      "

## 2018-05-03 NOTE — PROGRESS NOTES
Lung Cancer Screening Shared Decision Making Visit     Aga Gary is eligible for lung cancer screening on the basis of the information provided in my signed lung cancer screening order.     I have discussed with patient the risks and benefits of screening for lung cancer with low-dose CT.     The risks include:   radiation exposure    false positives     over-diagnosis    The benefit of early detection of lung cancer is contingent upon adherence to annual screening or more frequent follow up if indicated.     Furthermore, reaping the benefits of screening requires Aga Gary to be willing and physically able to undergo diagnostic procedures, if indicated. Although no specific guide is available for determining severity of comorbidities, it is reasonable to withhold screening in patients who have greater mortality risk from other diseases.     We did discuss that the only way to prevent lung cancer is to not smoke. Smoking cessation assistance was offered.    I did not offer risk estimation using a calculator such as this one:    ShouldIScreen

## 2018-05-09 ENCOUNTER — OFFICE VISIT (OUTPATIENT)
Dept: INTERNAL MEDICINE | Facility: OTHER | Age: 68
End: 2018-05-09
Attending: INTERNAL MEDICINE
Payer: COMMERCIAL

## 2018-05-09 VITALS
SYSTOLIC BLOOD PRESSURE: 160 MMHG | BODY MASS INDEX: 35.33 KG/M2 | DIASTOLIC BLOOD PRESSURE: 104 MMHG | OXYGEN SATURATION: 95 % | HEART RATE: 84 BPM | TEMPERATURE: 98.2 F | WEIGHT: 192 LBS | HEIGHT: 62 IN

## 2018-05-09 DIAGNOSIS — I10 BENIGN ESSENTIAL HYPERTENSION: ICD-10-CM

## 2018-05-09 DIAGNOSIS — E78.5 HYPERLIPIDEMIA LDL GOAL <130: Primary | ICD-10-CM

## 2018-05-09 PROCEDURE — 99214 OFFICE O/P EST MOD 30 MIN: CPT | Performed by: INTERNAL MEDICINE

## 2018-05-09 PROCEDURE — 93005 ELECTROCARDIOGRAM TRACING: CPT

## 2018-05-09 PROCEDURE — 93010 ELECTROCARDIOGRAM REPORT: CPT | Performed by: INTERNAL MEDICINE

## 2018-05-09 PROCEDURE — G0463 HOSPITAL OUTPT CLINIC VISIT: HCPCS

## 2018-05-09 RX ORDER — METOPROLOL TARTRATE 50 MG
50 TABLET ORAL 2 TIMES DAILY
Qty: 60 TABLET | Refills: 3 | Status: SHIPPED | OUTPATIENT
Start: 2018-05-09 | End: 2018-08-08

## 2018-05-09 ASSESSMENT — PAIN SCALES - GENERAL: PAINLEVEL: NO PAIN (0)

## 2018-05-09 NOTE — MR AVS SNAPSHOT
After Visit Summary   5/9/2018    Aga Gary    MRN: [id number]           Patient Information     Date Of Birth          1950        Visit Information        Provider Department      5/9/2018 3:00 PM Senthil Aguirre,  [facility]        Today's Diagnoses     Hyperlipidemia LDL goal <130    -  1    Benign essential hypertension          Care Instructions    Follow up for nurse visit for BP check next week and fasting labs  Lopressor 50 mg PO twice daily  EKG today          Follow-ups after your visit        Your next 10 appointments already scheduled     Jun 06, 2018  4:00 PM CDT   (Arrive by 3:45 PM)   MA SCREENING DIGITAL BILATERAL with MTMA1   [facility] ([facility] )    [address] [address]   [phone]           Do not use any powder, lotion or deodorant under your arms or on your breast. If you do, we will ask you to remove it before your exam.  Wear comfortable, two-piece clothing.  If you have any allergies, tell your care team.  Bring any previous mammograms from other facilities or have them mailed to the breast center.            Aug 22, 2018  1:15 PM CDT   (Arrive by 1:00 PM)   SHORT with Ana Valdez NP   [facility] ([facility] )    [address] [address] [phone]              Future tests that were ordered for you today     Open Future Orders        Priority Expected Expires Ordered    Comprehensive metabolic panel (BMP + Alb, Alk Phos, ALT, AST, Total. Bili, TP) Routine  5/9/2019 5/9/2018    Lipid Profile (Chol, Trig, HDL, LDL calc) Routine  5/9/2019 5/9/2018            Who to contact     If you have questions or need follow up information about today's clinic visit or your schedule please contact [facility] directly at [phone].  Normal or non-critical lab and imaging results will be  "communicated to you by MyChart, letter or phone within 4 business days after the clinic has received the results. If you do not hear from us within 7 days, please contact the clinic through [employer] or phone. If you have a critical or abnormal lab result, we will notify you by phone as soon as possible.  Submit refill requests through [employer] or call your pharmacy and they will forward the refill request to us. Please allow 3 business days for your refill to be completed.          Additional Information About Your Visit        [employer]ail[employer] Information     [employer] lets you send messages to your doctor, view your test results, renew your prescriptions, schedule appointments and more. To sign up, go to [email or web address] . Click on \"Log in\" on the left side of the screen, which will take you to the Welcome page. Then click on \"Sign up Now\" on the right side of the page.     You will be asked to enter the access code listed below, as well as some personal information. Please follow the directions to create your username and password.     Your access code is: [email or web address]  Expires: 2018 11:47 AM     Your access code will  in 90 days. If you need help or a new code, please call your [place] clinic or [phone].        Care EveryWhere ID     This is your Care EveryWhere ID. This could be used by other organizations to access your [place] medical records  [id number]        Your Vitals Were     Pulse Temperature Height Pulse Oximetry BMI (Body Mass Index)       84 98.2  F (36.8  C) (Tympanic) 5' 1.5\" (1.562 m) 95% 35.69 kg/m2        Blood Pressure from Last 3 Encounters:   18 (!) 160/104   18 145/88   18 158/90    Weight from Last 3 Encounters:   18 192 lb (87.1 kg)   18 192 lb 3.2 oz (87.2 kg)   18 194 lb (88 kg)              We Performed the Following     EKG 12-lead complete w/read - (Clinic Performed)          Today's Medication Changes          These changes are " accurate as of 5/9/18  3:08 PM.  If you have any questions, ask your nurse or doctor.               Start taking these medicines.        Dose/Directions    metoprolol tartrate 50 MG tablet   Commonly known as:  LOPRESSOR   Used for:  Benign essential hypertension   Started by:  Senhtil Aguirre DO        Dose:  50 mg   Take 1 tablet (50 mg) by mouth 2 times daily   Quantity:  60 tablet   Refills:  3            Where to get your medicines      These medications were sent to [facility] - [place], [place] - [address] [place] [address]     Phone:  [phone]     metoprolol tartrate 50 MG tablet                Primary Care Provider Office Phone # Fax #    Ana HaneyARA mayes [phone] [address]  [place] [address]        Equal Access to Services     [facility]: Hadii simón hinds hadasho Soomaali, waaxda luqadaha, qaybta kaalmada adeegyada, chandrika morrow . So [facility] [phone].    ATENCIÓN: Si habla español, tiene a roy disposición servicios gratuitos de asistencia lingüística. Flavia al [phone].    We comply with applicable federal civil rights laws and [place] laws. We do not discriminate on the basis of race, color, national origin, age, disability, sex, sexual orientation, or gender identity.            Thank you!     Thank you for choosing [facility]  for your care. Our goal is always to provide you with excellent care. Hearing back from our patients is one way we can continue to improve our services. Please take a few minutes to complete the written survey that you may receive in the mail after your visit with us. Thank you!             Your Updated Medication List - Protect others around you: Learn how to safely use, store and throw away your medicines at www.disposemymeds.org.          This list is accurate as of 5/9/18  3:08 PM.  Always use your most recent med list.                   Brand Name Dispense  Instructions for use Diagnosis    aspirin 81 MG EC tablet     90 tablet    Take 1 tablet (81 mg) by mouth daily    Benign essential hypertension       cholecalciferol 5000 units Tabs tablet    vitamin D3    90 tablet    Take 1 tablet (5,000 Units) by mouth daily    Encounter to establish care       DAILY MULTIPLE VITAMIN/IRON Tabs     90 tablet    Take 1 tablet by mouth daily    Encounter to establish care       losartan 50 MG tablet    COZAAR    45 tablet    Take 1.5 po qd    Benign essential hypertension       metoprolol tartrate 50 MG tablet    LOPRESSOR    60 tablet    Take 1 tablet (50 mg) by mouth 2 times daily    Benign essential hypertension       nicotine 21 MG/24HR 24 hr patch    NICODERM CQ    28 patch    Place 1 patch onto the skin every 24 hours    Tobacco abuse       omega 3 1000 MG Caps     90 capsule    3 po daily    Encounter to establish care, Benign essential hypertension       polyethylene glycol 236 g suspension    GoLYTELY/NuLYTELY    4000 mL    Take 4,000 mLs (4 L) by mouth See Admin Instructions    Encounter for screening colonoscopy       simvastatin 20 MG tablet    ZOCOR    90 tablet    Take 1 tablet (20 mg) by mouth At Bedtime    Hyperlipidemia LDL goal <100

## 2018-05-09 NOTE — PATIENT INSTRUCTIONS
Follow up for nurse visit for BP check next week and fasting labs  Lopressor 50 mg PO twice daily  EKG today

## 2018-05-09 NOTE — PROGRESS NOTES
SUBJECTIVE:   Aga Gary is a 67 year old female who presents to clinic today for the following health issues:      Hyperlipidemia Follow-Up      Rate your low fat/cholesterol diet?: not monitoring fat    Taking statin?  Yes, no muscle aches from statin    Other lipid medications/supplements?:  Fish oil/Omega 3, dose TID without side effects    Hypertension Follow-up      Outpatient blood pressures are not being checked.    Low Salt Diet: not monitoring salt      Amount of exercise or physical activity: None    Problems taking medications regularly: No    Medication side effects: none    Diet: regular (no restrictions)    Aga presents today in consultation from Ana Valdez NP due to newly diagnosed HTN and hyperlipidemia.  Aga states she has not been seen by a provider in over ten years.  She denies any HAs, chest pain or dizziness.  She does endorse some JJ. No vision changes with her new glasses.  No falls.  She does continue to smoke but is trying to cut back.  She has been taking her statin and Losartan as prescribed.      Problem list and histories reviewed & adjusted, as indicated.  Additional history: as documented    Patient Active Problem List   Diagnosis     Benign essential hypertension     Tobacco abuse     Hyperlipidemia LDL goal <100     Taking a statin medication     Past Surgical History:   Procedure Laterality Date     ABDOMEN SURGERY      2 c-sections     CHOLECYSTECTOMY  1994     GYN SURGERY      total hyst,, no cervix     HC REMOVAL OF NAIL PLATE SIMPLE SINGLE  04/19/2018    removal of 1/4  left gt toenail, local      HEAD & NECK SURGERY      tumor neck, benign     ORTHOPEDIC SURGERY Bilateral     carpul tunnel        Social History   Substance Use Topics     Smoking status: Heavy Tobacco Smoker     Packs/day: 1.00     Years: 40.00     Types: Cigarettes     Smokeless tobacco: Never Used      Comment: pt is slowly cutting down- 1 pack a day      Alcohol use No     Family History  "  Problem Relation Age of Onset     Other Cancer Mother      Other Cancer Brother      Breast Cancer Paternal Aunt          Current Outpatient Prescriptions   Medication Sig Dispense Refill     aspirin 81 MG EC tablet Take 1 tablet (81 mg) by mouth daily 90 tablet 3     cholecalciferol (VITAMIN D3) 5000 units TABS tablet Take 1 tablet (5,000 Units) by mouth daily 90 tablet 11     losartan (COZAAR) 50 MG tablet Take 1.5 po qd 45 tablet 1     metoprolol tartrate (LOPRESSOR) 50 MG tablet Take 1 tablet (50 mg) by mouth 2 times daily 60 tablet 3     Multiple Vitamins-Iron (DAILY MULTIPLE VITAMIN/IRON) TABS Take 1 tablet by mouth daily 90 tablet 11     nicotine (NICODERM CQ) 21 MG/24HR 24 hr patch Place 1 patch onto the skin every 24 hours 28 patch 5     omega 3 1000 MG CAPS 3 po daily 90 capsule 11     polyethylene glycol (GOLYTELY/NULYTELY) 236 g suspension Take 4,000 mLs (4 L) by mouth See Admin Instructions 4000 mL 0     simvastatin (ZOCOR) 20 MG tablet Take 1 tablet (20 mg) by mouth At Bedtime 90 tablet 1     No Known Allergies  BP Readings from Last 3 Encounters:   05/09/18 (!) 160/104   05/03/18 145/88   04/25/18 158/90    Wt Readings from Last 3 Encounters:   05/09/18 192 lb (87.1 kg)   05/03/18 192 lb 3.2 oz (87.2 kg)   04/25/18 194 lb (88 kg)        Reviewed and updated as needed this visit by clinical staff       Reviewed and updated as needed this visit by Provider       ROS:  Constitutional, HEENT, cardiovascular, pulmonary, GI, , musculoskeletal, neuro, skin, endocrine and psych systems are negative, except as otherwise noted.    OBJECTIVE:     BP (!) 160/104 (BP Location: Left arm, Patient Position: Supine, Cuff Size: Adult Large)  Pulse 84  Temp 98.2  F (36.8  C) (Tympanic)  Ht 5' 1.5\" (1.562 m)  Wt 192 lb (87.1 kg)  SpO2 95%  BMI 35.69 kg/m2  Body mass index is 35.69 kg/(m^2).   GENERAL: healthy, alert and no distress  EYES: Eyes grossly normal to inspection, PERRL and conjunctivae and sclerae " normal  HENT: ear canals and TM's normal, nose and mouth without ulcers or lesions  NECK: no adenopathy, no asymmetry, masses, or scars and thyroid normal to palpation  RESP: lungs clear to auscultation - no rales, rhonchi or wheezes  CV: regular rate and rhythm, normal S1 S2, no S3 or S4, no murmur, click or rub, no peripheral edema and peripheral pulses strong  ABDOMEN: soft, nontender, no hepatosplenomegaly, no masses and bowel sounds normal  MS: no gross musculoskeletal defects noted, no edema  SKIN: no suspicious lesions or rashes  NEURO: Normal strength and tone, mentation intact and speech normal  PSYCH: mentation appears normal, affect normal/bright    Diagnostic Test Results:  Results for orders placed or performed in visit on 04/13/18   XR CHEST 2 VW (Clinic Performed)    Narrative    PROCEDURE:  XR CHEST 2 VW    HISTORY:  smoker, HTN; Benign essential hypertension; Tobacco abuse.     COMPARISON:  None.    FINDINGS:   The cardiac silhouette is normal in size. The pulmonary vasculature is  normal.  The lungs are clear. No pleural effusion or pneumothorax.      Impression    IMPRESSION:  No acute cardiopulmonary disease.      COOPER MILLER MD     Recent Labs   Lab Test  04/13/18   1201   CHOL  229*   HDL  38*   LDL  156*   TRIG  174*     Last Basic Metabolic Panel:  Lab Results   Component Value Date     04/13/2018      Lab Results   Component Value Date    POTASSIUM 4.1 04/13/2018     Lab Results   Component Value Date    CHLORIDE 105 04/13/2018     Lab Results   Component Value Date    LASHELL 9.6 04/13/2018     Lab Results   Component Value Date    CO2 29 04/13/2018     Lab Results   Component Value Date    BUN 9 04/13/2018     Lab Results   Component Value Date    CR 0.81 04/13/2018     Lab Results   Component Value Date    GLC 98 04/13/2018     Lab Results   Component Value Date    WBC 12.1 04/13/2018     Lab Results   Component Value Date    RBC 5.62 04/13/2018     Lab Results   Component Value Date     HGB 16.5 04/13/2018     Lab Results   Component Value Date    HCT 49.4 04/13/2018     No components found for: MCT  Lab Results   Component Value Date    MCV 88 04/13/2018     Lab Results   Component Value Date    MCH 29.4 04/13/2018     Lab Results   Component Value Date    MCHC 33.4 04/13/2018     Lab Results   Component Value Date    RDW 13.9 04/13/2018     Lab Results   Component Value Date     04/13/2018     TSH   Date Value Ref Range Status   04/13/2018 5.58 (H) 0.40 - 4.00 mU/L Final   ]    EKG:  NSR      ASSESSMENT/PLAN:     Problem List Items Addressed This Visit     Benign essential hypertension    Relevant Medications    metoprolol tartrate (LOPRESSOR) 50 MG tablet    Other Relevant Orders    EKG 12-lead complete w/read - (Clinic Performed)      Other Visit Diagnoses     Hyperlipidemia LDL goal <130    -  Primary    Relevant Orders    Comprehensive metabolic panel (BMP + Alb, Alk Phos, ALT, AST, Total. Bili, TP)    Lipid Profile (Chol, Trig, HDL, LDL calc)         Patient instructions:  Follow up for nurse visit for BP check next week and fasting labs  Lopressor 50 mg PO twice daily  EKG today    Senthil Aguirre, [facility]    CC: Ana Valdez NP

## 2018-05-10 ASSESSMENT — PATIENT HEALTH QUESTIONNAIRE - PHQ9: SUM OF ALL RESPONSES TO PHQ QUESTIONS 1-9: 3

## 2018-05-16 ENCOUNTER — ALLIED HEALTH/NURSE VISIT (OUTPATIENT)
Dept: FAMILY MEDICINE | Facility: OTHER | Age: 68
End: 2018-05-16
Payer: COMMERCIAL

## 2018-05-16 VITALS — HEART RATE: 64 BPM | DIASTOLIC BLOOD PRESSURE: 80 MMHG | SYSTOLIC BLOOD PRESSURE: 122 MMHG | RESPIRATION RATE: 14 BRPM

## 2018-05-16 DIAGNOSIS — E78.5 HYPERLIPIDEMIA LDL GOAL <130: ICD-10-CM

## 2018-05-16 DIAGNOSIS — I10 BENIGN ESSENTIAL HYPERTENSION: Primary | ICD-10-CM

## 2018-05-16 LAB
ALBUMIN SERPL-MCNC: 3.7 G/DL (ref 3.4–5)
ALP SERPL-CCNC: 83 U/L (ref 40–150)
ALT SERPL W P-5'-P-CCNC: 52 U/L (ref 0–50)
ANION GAP SERPL CALCULATED.3IONS-SCNC: 9 MMOL/L (ref 3–14)
AST SERPL W P-5'-P-CCNC: 50 U/L (ref 0–45)
BILIRUB SERPL-MCNC: 1.1 MG/DL (ref 0.2–1.3)
BUN SERPL-MCNC: 14 MG/DL (ref 7–30)
CALCIUM SERPL-MCNC: 8.9 MG/DL (ref 8.5–10.1)
CHLORIDE SERPL-SCNC: 108 MMOL/L (ref 94–109)
CHOLEST SERPL-MCNC: 168 MG/DL
CO2 SERPL-SCNC: 23 MMOL/L (ref 20–32)
CREAT SERPL-MCNC: 0.7 MG/DL (ref 0.52–1.04)
GFR SERPL CREATININE-BSD FRML MDRD: 83 ML/MIN/1.7M2
GLUCOSE SERPL-MCNC: 112 MG/DL (ref 70–99)
HDLC SERPL-MCNC: 39 MG/DL
LDLC SERPL CALC-MCNC: 101 MG/DL
NONHDLC SERPL-MCNC: 129 MG/DL
POTASSIUM SERPL-SCNC: 4.1 MMOL/L (ref 3.4–5.3)
PROT SERPL-MCNC: 8.5 G/DL (ref 6.8–8.8)
SODIUM SERPL-SCNC: 140 MMOL/L (ref 133–144)
TRIGL SERPL-MCNC: 141 MG/DL

## 2018-05-16 PROCEDURE — 80061 LIPID PANEL: CPT | Mod: ZL | Performed by: INTERNAL MEDICINE

## 2018-05-16 PROCEDURE — 36415 COLL VENOUS BLD VENIPUNCTURE: CPT | Mod: ZL | Performed by: INTERNAL MEDICINE

## 2018-05-16 PROCEDURE — 99207 ZZC NO CHARGE NURSE ONLY: CPT

## 2018-05-16 PROCEDURE — 80053 COMPREHEN METABOLIC PANEL: CPT | Mod: ZL | Performed by: INTERNAL MEDICINE

## 2018-05-16 ASSESSMENT — PAIN SCALES - GENERAL: PAINLEVEL: NO PAIN (0)

## 2018-05-16 NOTE — MR AVS SNAPSHOT
After Visit Summary   5/16/2018    Aga Gary    MRN: [id number]           Patient Information     Date Of Birth          1950        Visit Information        Provider Department      5/16/2018 2:00 PM [facility] NURSE [facility]        Today's Diagnoses     Benign essential hypertension    -  1       Follow-ups after your visit        Your next 10 appointments already scheduled     May 23, 2018  8:00 AM CDT   (Arrive by 7:45 AM)   SHORT with Senthil Aguirre DO   [facility] ([facility] )    [address]  [place] [id number] [phone]            Jun 06, 2018  4:00 PM CDT   (Arrive by 3:45 PM)   MA SCREENING DIGITAL BILATERAL with MTMA1   [facility] ([facility] )    [address]  [place] [address]   [phone]           Do not use any powder, lotion or deodorant under your arms or on your breast. If you do, we will ask you to remove it before your exam.  Wear comfortable, two-piece clothing.  If you have any allergies, tell your care team.  Bring any previous mammograms from other facilities or have them mailed to the breast center.            Aug 22, 2018  1:15 PM CDT   (Arrive by 1:00 PM)   SHORT with Ana Valdez NP   [facility] ([facility] )    [address]  [place] [address]   [phone]              Who to contact     If you have questions or need follow up information about today's clinic visit or your schedule please contact [facility] directly at [phone].  Normal or non-critical lab and imaging results will be communicated to you by MyChart, letter or phone within 4 business days after the clinic has received the results. If you do not hear from us within 7 days, please contact the clinic through MyChart or phone. If you have a critical or abnormal lab result, we  "will notify you by phone as soon as possible.  Submit refill requests through [email or web address] or call your pharmacy and they will forward the refill request to us. Please allow 3 business days for your refill to be completed.          Additional Information About Your Visit        Anatolehart Information     [email or web address] lets you send messages to your doctor, view your test results, renew your prescriptions, schedule appointments and more. To sign up, go to [email or web address] . Click on \"Log in\" on the left side of the screen, which will take you to the Welcome page. Then click on \"Sign up Now\" on the right side of the page.     You will be asked to enter the access code listed below, as well as some personal information. Please follow the directions to create your username and password.     Your access code is: [email or web address]  Expires: 2018 11:47 AM     Your access code will  in 90 days. If you need help or a new code, please call your [facility] clinic or [phone].        Care EveryWhere ID     This is your Care EveryWhere ID. This could be used by other organizations to access your [facility] medical records  [id number]        Your Vitals Were     Pulse Respirations                64 14           Blood Pressure from Last 3 Encounters:   18 122/80   18 (!) 160/104   18 145/88    Weight from Last 3 Encounters:   18 192 lb (87.1 kg)   18 192 lb 3.2 oz (87.2 kg)   18 194 lb (88 kg)              Today, you had the following     No orders found for display       Primary Care Provider Office Phone # Fax #    Ana Valdez [phone] [address]  [facility] [address]        Equal Access to Services     CLAUDIA COOL : Bertram Morelos, rula whittaker, saniya kaalmada murtaza, chandrika edward. So [facility] [phone].    ATENCIÓN: Si habla español, tiene a roy disposición servicios gratuitos de asistencia " lingüística. Flavia al [phone].    We comply with applicable federal civil rights laws and [place] laws. We do not discriminate on the basis of race, color, national origin, age, disability, sex, sexual orientation, or gender identity.            Thank you!     Thank you for choosing [facility]  for your care. Our goal is always to provide you with excellent care. Hearing back from our patients is one way we can continue to improve our services. Please take a few minutes to complete the written survey that you may receive in the mail after your visit with us. Thank you!             Your Updated Medication List - Protect others around you: Learn how to safely use, store and throw away your medicines at www.disposemymeds.org.          This list is accurate as of 5/16/18 11:59 PM.  Always use your most recent med list.                   Brand Name Dispense Instructions for use Diagnosis    aspirin 81 MG EC tablet     90 tablet    Take 1 tablet (81 mg) by mouth daily    Benign essential hypertension       cholecalciferol 5000 units Tabs tablet    vitamin D3    90 tablet    Take 1 tablet (5,000 Units) by mouth daily    Encounter to establish care       DAILY MULTIPLE VITAMIN/IRON Tabs     90 tablet    Take 1 tablet by mouth daily    Encounter to establish care       losartan 50 MG tablet    COZAAR    45 tablet    Take 1.5 po qd    Benign essential hypertension       metoprolol tartrate 50 MG tablet    LOPRESSOR    60 tablet    Take 1 tablet (50 mg) by mouth 2 times daily    Benign essential hypertension       nicotine 21 MG/24HR 24 hr patch    NICODERM CQ    28 patch    Place 1 patch onto the skin every 24 hours    Tobacco abuse       omega 3 1000 MG Caps     90 capsule    3 po daily    Encounter to establish care, Benign essential hypertension       polyethylene glycol 236 g suspension    GoLYTELY/NuLYTELY    4000 mL    Take 4,000 mLs (4 L) by mouth See Admin Instructions    Encounter for screening  colonoscopy       simvastatin 20 MG tablet    ZOCOR    90 tablet    Take 1 tablet (20 mg) by mouth At Bedtime    Hyperlipidemia LDL goal <100

## 2018-05-16 NOTE — PROGRESS NOTES
Pt here for blood pressure check, started Cozaar middle of April.  Saw , and given Lopressor.  today's blood pressure 122/80, pulse 64, scheduled to see IM 5- for follow-up blood pressure.

## 2018-05-16 NOTE — NURSING NOTE
"Chief Complaint   Patient presents with     Hypertension       Initial /80 (BP Location: Right arm, Patient Position: Sitting, Cuff Size: Adult Large)  Pulse 64  Resp 14 Estimated body mass index is 35.69 kg/(m^2) as calculated from the following:    Height as of 5/9/18: 5' 1.5\" (1.562 m).    Weight as of 5/9/18: 192 lb (87.1 kg).  Medication Reconciliation: complete    Leonarda Brar LPN    "

## 2018-05-23 ENCOUNTER — OFFICE VISIT (OUTPATIENT)
Dept: INTERNAL MEDICINE | Facility: OTHER | Age: 68
End: 2018-05-23
Attending: INTERNAL MEDICINE
Payer: COMMERCIAL

## 2018-05-23 VITALS
OXYGEN SATURATION: 96 % | BODY MASS INDEX: 35.15 KG/M2 | WEIGHT: 191 LBS | HEART RATE: 65 BPM | HEIGHT: 62 IN | TEMPERATURE: 97.9 F | DIASTOLIC BLOOD PRESSURE: 90 MMHG | SYSTOLIC BLOOD PRESSURE: 148 MMHG

## 2018-05-23 DIAGNOSIS — R19.7 DIARRHEA, UNSPECIFIED TYPE: ICD-10-CM

## 2018-05-23 DIAGNOSIS — I10 BENIGN ESSENTIAL HYPERTENSION: ICD-10-CM

## 2018-05-23 DIAGNOSIS — E78.5 HYPERLIPIDEMIA LDL GOAL <100: Primary | ICD-10-CM

## 2018-05-23 PROCEDURE — 99214 OFFICE O/P EST MOD 30 MIN: CPT | Performed by: INTERNAL MEDICINE

## 2018-05-23 PROCEDURE — G0463 HOSPITAL OUTPT CLINIC VISIT: HCPCS

## 2018-05-23 RX ORDER — LOSARTAN POTASSIUM 100 MG/1
TABLET ORAL
Qty: 90 TABLET | Refills: 3 | Status: SHIPPED | OUTPATIENT
Start: 2018-05-23 | End: 2018-07-17

## 2018-05-23 ASSESSMENT — ANXIETY QUESTIONNAIRES
5. BEING SO RESTLESS THAT IT IS HARD TO SIT STILL: SEVERAL DAYS
7. FEELING AFRAID AS IF SOMETHING AWFUL MIGHT HAPPEN: NOT AT ALL
IF YOU CHECKED OFF ANY PROBLEMS ON THIS QUESTIONNAIRE, HOW DIFFICULT HAVE THESE PROBLEMS MADE IT FOR YOU TO DO YOUR WORK, TAKE CARE OF THINGS AT HOME, OR GET ALONG WITH OTHER PEOPLE: SOMEWHAT DIFFICULT
6. BECOMING EASILY ANNOYED OR IRRITABLE: NOT AT ALL
GAD7 TOTAL SCORE: 9
2. NOT BEING ABLE TO STOP OR CONTROL WORRYING: MORE THAN HALF THE DAYS
3. WORRYING TOO MUCH ABOUT DIFFERENT THINGS: MORE THAN HALF THE DAYS
1. FEELING NERVOUS, ANXIOUS, OR ON EDGE: MORE THAN HALF THE DAYS

## 2018-05-23 ASSESSMENT — PATIENT HEALTH QUESTIONNAIRE - PHQ9: 5. POOR APPETITE OR OVEREATING: MORE THAN HALF THE DAYS

## 2018-05-23 ASSESSMENT — PAIN SCALES - GENERAL: PAINLEVEL: NO PAIN (0)

## 2018-05-23 NOTE — MR AVS SNAPSHOT
After Visit Summary   5/23/2018    Aga Gary    MRN: [id number]           Patient Information     Date Of Birth          1950        Visit Information        Provider Department      5/23/2018 8:00 AM Senthil Aguirre,  [facility]        Today's Diagnoses     Hyperlipidemia LDL goal <100    -  1    Benign essential hypertension        Diarrhea, unspecified type          Care Instructions    Increase Losartan to 100mg PO daily  Stool studies  Colonoscopy next week          Follow-ups after your visit        Your next 10 appointments already scheduled     Jun 06, 2018  4:00 PM CDT   (Arrive by 3:45 PM)   MA SCREENING DIGITAL BILATERAL with MTMA1   [facility] ([facility] )    [address] [facility] [address]   [phone]           Do not use any powder, lotion or deodorant under your arms or on your breast. If you do, we will ask you to remove it before your exam.  Wear comfortable, two-piece clothing.  If you have any allergies, tell your care team.  Bring any previous mammograms from other facilities or have them mailed to the breast center.            Aug 22, 2018  1:15 PM CDT   (Arrive by 1:00 PM)   SHORT with Ana Valdez NP   [facility] ([facility] )    [address] [address]   [phone]              Future tests that were ordered for you today     Open Future Orders        Priority Expected Expires Ordered    Ova and Parasite Exam Routine Routine  5/23/2019 5/23/2018    Clostridium difficile Toxin B PCR Routine  6/22/2018 5/23/2018            Who to contact     If you have questions or need follow up information about today's clinic visit or your schedule please contact [facility] directly at [phone].  Normal or non-critical lab and imaging results will be communicated to you by MyChart, letter or phone within 4  "business days after the clinic has received the results. If you do not hear from us within 7 days, please contact the clinic through NitroPCR or phone. If you have a critical or abnormal lab result, we will notify you by phone as soon as possible.  Submit refill requests through NitroPCR or call your pharmacy and they will forward the refill request to us. Please allow 3 business days for your refill to be completed.          Additional Information About Your Visit        Care EveryWhere ID     This is your Care EveryWhere ID. This could be used by other organizations to access your [facility] medical records  [id number]        Your Vitals Were     Pulse Temperature Height Pulse Oximetry BMI (Body Mass Index)       65 97.9  F (36.6  C) (Tympanic) 5' 1.5\" (1.562 m) 96% 35.5 kg/m2        Blood Pressure from Last 3 Encounters:   05/23/18 148/90   05/16/18 122/80   05/09/18 (!) 160/104    Weight from Last 3 Encounters:   05/23/18 191 lb (86.6 kg)   05/09/18 192 lb (87.1 kg)   05/03/18 192 lb 3.2 oz (87.2 kg)                 Today's Medication Changes          These changes are accurate as of 5/23/18  8:05 AM.  If you have any questions, ask your nurse or doctor.               These medicines have changed or have updated prescriptions.        Dose/Directions    losartan 100 MG tablet   Commonly known as:  COZAAR   This may have changed:  medication strength   Used for:  Benign essential hypertension   Changed by:  Senthil Aguirre, DO        Take 1.5 po qd   Quantity:  90 tablet   Refills:  3            Where to get your medicines      These medications were sent to [facility] - [place], [place] - [address] [address]     Phone:  [phone]     losartan 100 MG tablet                Primary Care Provider Office Phone # Fax #    Ana Valdez [phone] [address]  [place] [address]        Equal Access to Services     CLAUDIA COOL AH: Bertram quesada " Jethro, toreyda luqadaha, qaybta kaalmada murtaza, chandrika davidin hayaan tammychandrakant shailarita laOtonielgianni wagner. So [facility] [phone].    ATENCIÓN: Si ramiro prajapati, tiene a roy disposición servicios gratuitos de asistencia lingüística. Flavia al [phone].    We comply with applicable federal civil rights laws and [place] laws. We do not discriminate on the basis of race, color, national origin, age, disability, sex, sexual orientation, or gender identity.            Thank you!     Thank you for choosing [facility]  for your care. Our goal is always to provide you with excellent care. Hearing back from our patients is one way we can continue to improve our services. Please take a few minutes to complete the written survey that you may receive in the mail after your visit with us. Thank you!             Your Updated Medication List - Protect others around you: Learn how to safely use, store and throw away your medicines at www.disposemymeds.org.          This list is accurate as of 5/23/18  8:05 AM.  Always use your most recent med list.                   Brand Name Dispense Instructions for use Diagnosis    aspirin 81 MG EC tablet     90 tablet    Take 1 tablet (81 mg) by mouth daily    Benign essential hypertension       cholecalciferol 5000 units Tabs tablet    vitamin D3    90 tablet    Take 1 tablet (5,000 Units) by mouth daily    Encounter to establish care       DAILY MULTIPLE VITAMIN/IRON Tabs     90 tablet    Take 1 tablet by mouth daily    Encounter to establish care       losartan 100 MG tablet    COZAAR    90 tablet    Take 1.5 po qd    Benign essential hypertension       metoprolol tartrate 50 MG tablet    LOPRESSOR    60 tablet    Take 1 tablet (50 mg) by mouth 2 times daily    Benign essential hypertension       nicotine 21 MG/24HR 24 hr patch    NICODERM CQ    28 patch    Place 1 patch onto the skin every 24 hours    Tobacco abuse       omega 3 1000 MG Caps     90 capsule    3 po daily    Encounter  to establish care, Benign essential hypertension       polyethylene glycol 236 g suspension    GoLYTELY/NuLYTELY    4000 mL    Take 4,000 mLs (4 L) by mouth See Admin Instructions    Encounter for screening colonoscopy       simvastatin 20 MG tablet    ZOCOR    90 tablet    Take 1 tablet (20 mg) by mouth At Bedtime    Hyperlipidemia LDL goal <100

## 2018-05-23 NOTE — PROGRESS NOTES
"  SUBJECTIVE:   Aga Gary is a 67 year old female who presents to clinic today for the following health issues:      Hypertension Follow-up      Outpatient blood pressures are not being checked.    Low Salt Diet: not monitoring salt      Amount of exercise or physical activity: None    Problems taking medications regularly: No    Medication side effects: none    Diet: regular (no restrictions)        Diarrhea      Duration: 2 nights     Description:       Consistency of stool: watery and loose       Blood in stool: no        Number of loose stools past 24 hours: 3-4 \"im up all night\"     Intensity:  n/a    Accompanying signs and symptoms:       Fever: no        Nausea/vomitting: YES- nausea       Abdominal pain: YES- cramps before urge to go       Weight loss: YES- 1 pound    History (recent antibiotics or travel/ill contacts/med changes/testing done): none    Precipitating or alleviating factors: None    Therapies tried and outcome: n/a    Aga presents today for BP follow up.  Nurse only visit BP was at goal but elevated today.  Her primary concern today is diarrhea she has experienced since being treated for a sinus infection about 1 month ago.  No fevers.  Watery diarrhea more so at night.  Some cramping.  No blood.  She is scheduled for a colonoscopy in 1 week.      Problem list and histories reviewed & adjusted, as indicated.  Additional history: as documented    Patient Active Problem List   Diagnosis     Benign essential hypertension     Tobacco abuse     Hyperlipidemia LDL goal <100     Taking a statin medication     Past Surgical History:   Procedure Laterality Date     ABDOMEN SURGERY      2 c-sections     CHOLECYSTECTOMY  1994     GYN SURGERY      total hyst,, no cervix     HC REMOVAL OF NAIL PLATE SIMPLE SINGLE  04/19/2018    removal of 1/4  left gt toenail, local      HEAD & NECK SURGERY      tumor neck, benign     ORTHOPEDIC SURGERY Bilateral     carpul tunnel        Social History "   Substance Use Topics     Smoking status: Heavy Tobacco Smoker     Packs/day: 1.00     Years: 40.00     Types: Cigarettes     Smokeless tobacco: Never Used      Comment: pt is slowly cutting down- 1 pack a day      Alcohol use No     Family History   Problem Relation Age of Onset     Other Cancer Mother      Other Cancer Brother      Breast Cancer Paternal Aunt          Current Outpatient Prescriptions   Medication Sig Dispense Refill     aspirin 81 MG EC tablet Take 1 tablet (81 mg) by mouth daily 90 tablet 3     cholecalciferol (VITAMIN D3) 5000 units TABS tablet Take 1 tablet (5,000 Units) by mouth daily 90 tablet 11     losartan (COZAAR) 100 MG tablet Take 1.5 po qd 90 tablet 3     metoprolol tartrate (LOPRESSOR) 50 MG tablet Take 1 tablet (50 mg) by mouth 2 times daily 60 tablet 3     Multiple Vitamins-Iron (DAILY MULTIPLE VITAMIN/IRON) TABS Take 1 tablet by mouth daily 90 tablet 11     nicotine (NICODERM CQ) 21 MG/24HR 24 hr patch Place 1 patch onto the skin every 24 hours (Patient not taking: Reported on 5/16/2018) 28 patch 5     omega 3 1000 MG CAPS 3 po daily 90 capsule 11     polyethylene glycol (GOLYTELY/NULYTELY) 236 g suspension Take 4,000 mLs (4 L) by mouth See Admin Instructions (Patient not taking: Reported on 5/16/2018) 4000 mL 0     simvastatin (ZOCOR) 20 MG tablet Take 1 tablet (20 mg) by mouth At Bedtime 90 tablet 1     [DISCONTINUED] losartan (COZAAR) 50 MG tablet Take 1.5 po qd 45 tablet 1     No Known Allergies  BP Readings from Last 3 Encounters:   05/23/18 148/90   05/16/18 122/80   05/09/18 (!) 160/104    Wt Readings from Last 3 Encounters:   05/23/18 191 lb (86.6 kg)   05/09/18 192 lb (87.1 kg)   05/03/18 192 lb 3.2 oz (87.2 kg)           Reviewed and updated as needed this visit by clinical staff       Reviewed and updated as needed this visit by Provider       ROS:  Constitutional, HEENT, cardiovascular, pulmonary, gi and gu systems are negative, except as otherwise noted.    OBJECTIVE:  "    /90  Pulse 65  Temp 97.9  F (36.6  C) (Tympanic)  Ht 5' 1.5\" (1.562 m)  Wt 191 lb (86.6 kg)  SpO2 96%  BMI 35.5 kg/m2  Body mass index is 35.5 kg/(m^2).   GENERAL: Alert and no distress  NECK: no adenopathy, no asymmetry, masses, or scars and thyroid normal to palpation  RESP: lungs clear to auscultation - no rales, rhonchi or wheezes  CV: regular rate and rhythm, normal S1 S2, no S3 or S4, no murmur, click or rub, no peripheral edema and peripheral pulses strong  ABDOMEN: soft, nontender, no hepatosplenomegaly, no masses and bowel sounds normal  MS: no gross musculoskeletal defects noted, no edema  SKIN: no suspicious lesions or rashes  NEURO: No focal deficits noted   PSYCH: mentation appears normal, affect normal/bright    Diagnostic Test Results:  Results for orders placed or performed in visit on 05/16/18   Comprehensive metabolic panel (BMP + Alb, Alk Phos, ALT, AST, Total. Bili, TP)   Result Value Ref Range    Sodium 140 133 - 144 mmol/L    Potassium 4.1 3.4 - 5.3 mmol/L    Chloride 108 94 - 109 mmol/L    Carbon Dioxide 23 20 - 32 mmol/L    Anion Gap 9 3 - 14 mmol/L    Glucose 112 (H) 70 - 99 mg/dL    Urea Nitrogen 14 7 - 30 mg/dL    Creatinine 0.70 0.52 - 1.04 mg/dL    GFR Estimate 83 >60 mL/min/1.7m2    GFR Estimate If Black >90 >60 mL/min/1.7m2    Calcium 8.9 8.5 - 10.1 mg/dL    Bilirubin Total 1.1 0.2 - 1.3 mg/dL    Albumin 3.7 3.4 - 5.0 g/dL    Protein Total 8.5 6.8 - 8.8 g/dL    Alkaline Phosphatase 83 40 - 150 U/L    ALT 52 (H) 0 - 50 U/L    AST 50 (H) 0 - 45 U/L   Lipid Profile (Chol, Trig, HDL, LDL calc)   Result Value Ref Range    Cholesterol 168 <200 mg/dL    Triglycerides 141 <150 mg/dL    HDL Cholesterol 39 (L) >49 mg/dL    LDL Cholesterol Calculated 101 (H) <100 mg/dL    Non HDL Cholesterol 129 <130 mg/dL       ASSESSMENT/PLAN:     Problem List Items Addressed This Visit     Benign essential hypertension    Relevant Medications    losartan (COZAAR) 100 MG tablet    Hyperlipidemia " LDL goal <100 - Primary      Other Visit Diagnoses     Diarrhea, unspecified type        Relevant Orders    Ova and Parasite Exam Routine    Clostridium difficile Toxin B PCR         MEDICATIONS:        - Increase losartan to 100 mg PO daily    Follow up in 1 month      Senthil Aguirre DO  [facility]

## 2018-05-24 ASSESSMENT — ANXIETY QUESTIONNAIRES: GAD7 TOTAL SCORE: 9

## 2018-05-25 DIAGNOSIS — R19.7 DIARRHEA, UNSPECIFIED TYPE: ICD-10-CM

## 2018-05-25 LAB
C DIFF TOX B STL QL: NEGATIVE
SPECIMEN SOURCE: NORMAL

## 2018-05-25 PROCEDURE — 87329 GIARDIA AG IA: CPT | Mod: ZL | Performed by: INTERNAL MEDICINE

## 2018-05-25 PROCEDURE — 87493 C DIFF AMPLIFIED PROBE: CPT | Mod: ZL | Performed by: INTERNAL MEDICINE

## 2018-05-25 PROCEDURE — 87328 CRYPTOSPORIDIUM AG IA: CPT | Mod: ZL | Performed by: INTERNAL MEDICINE

## 2018-05-26 LAB
G LAMBLIA+CRYPTOSP AG STL QL IA: NORMAL
G LAMBLIA+CRYPTOSP AG STL QL IA: NORMAL
SPECIMEN SOURCE: NORMAL

## 2018-05-29 ENCOUNTER — TELEPHONE (OUTPATIENT)
Dept: SURGERY | Facility: OTHER | Age: 68
End: 2018-05-29

## 2018-05-29 NOTE — TELEPHONE ENCOUNTER
Patient called and needed to reschedule due to she ate today [place] was notified and patient rescheduled and was given new date of June 13, 2018.  Marla Durham LPN

## 2018-06-06 ENCOUNTER — RADIANT APPOINTMENT (OUTPATIENT)
Dept: MAMMOGRAPHY | Facility: OTHER | Age: 68
End: 2018-06-06
Attending: NURSE PRACTITIONER
Payer: COMMERCIAL

## 2018-06-06 DIAGNOSIS — Z12.31 ENCOUNTER FOR SCREENING MAMMOGRAM FOR BREAST CANCER: ICD-10-CM

## 2018-06-06 PROCEDURE — 77067 SCR MAMMO BI INCL CAD: CPT | Mod: TC

## 2018-06-13 ENCOUNTER — RESULTS ONLY (OUTPATIENT)
Dept: LAB | Age: 68
End: 2018-06-13

## 2018-06-13 ENCOUNTER — OFFICE VISIT (OUTPATIENT)
Dept: ANESTHESIOLOGY | Facility: HOSPITAL | Age: 68
End: 2018-06-13
Attending: SURGERY
Payer: COMMERCIAL

## 2018-06-13 PROCEDURE — 00811 ANES LWR INTST NDSC NOS: CPT | Mod: QZ | Performed by: NURSE ANESTHETIST, CERTIFIED REGISTERED

## 2018-06-13 PROCEDURE — 45385 COLONOSCOPY W/LESION REMOVAL: CPT | Mod: PT | Performed by: SURGERY

## 2018-06-14 LAB — COPATH REPORT: NORMAL

## 2018-06-25 ENCOUNTER — OFFICE VISIT (OUTPATIENT)
Dept: INTERNAL MEDICINE | Facility: OTHER | Age: 68
End: 2018-06-25
Attending: INTERNAL MEDICINE
Payer: COMMERCIAL

## 2018-06-25 VITALS
TEMPERATURE: 97.6 F | BODY MASS INDEX: 34.96 KG/M2 | HEIGHT: 62 IN | HEART RATE: 58 BPM | OXYGEN SATURATION: 93 % | SYSTOLIC BLOOD PRESSURE: 150 MMHG | DIASTOLIC BLOOD PRESSURE: 80 MMHG | WEIGHT: 190 LBS

## 2018-06-25 DIAGNOSIS — I10 BENIGN ESSENTIAL HYPERTENSION: ICD-10-CM

## 2018-06-25 DIAGNOSIS — Z71.6 TOBACCO ABUSE COUNSELING: ICD-10-CM

## 2018-06-25 DIAGNOSIS — I10 BENIGN ESSENTIAL HYPERTENSION: Primary | ICD-10-CM

## 2018-06-25 DIAGNOSIS — E66.01 MORBID OBESITY (H): ICD-10-CM

## 2018-06-25 DIAGNOSIS — Z72.0 TOBACCO ABUSE: ICD-10-CM

## 2018-06-25 PROCEDURE — 80048 BASIC METABOLIC PNL TOTAL CA: CPT | Mod: ZL | Performed by: INTERNAL MEDICINE

## 2018-06-25 PROCEDURE — 36415 COLL VENOUS BLD VENIPUNCTURE: CPT | Mod: ZL | Performed by: INTERNAL MEDICINE

## 2018-06-25 PROCEDURE — G0463 HOSPITAL OUTPT CLINIC VISIT: HCPCS

## 2018-06-25 PROCEDURE — 99213 OFFICE O/P EST LOW 20 MIN: CPT | Performed by: INTERNAL MEDICINE

## 2018-06-25 RX ORDER — AMLODIPINE BESYLATE 10 MG/1
10 TABLET ORAL DAILY
Qty: 30 TABLET | Refills: 1 | Status: SHIPPED | OUTPATIENT
Start: 2018-06-25 | End: 2018-06-25

## 2018-06-25 RX ORDER — AMLODIPINE BESYLATE 10 MG/1
10 TABLET ORAL DAILY
Qty: 30 TABLET | Refills: 1 | Status: SHIPPED | OUTPATIENT
Start: 2018-06-25 | End: 2018-07-26

## 2018-06-25 ASSESSMENT — PAIN SCALES - GENERAL: PAINLEVEL: NO PAIN (0)

## 2018-06-25 ASSESSMENT — ANXIETY QUESTIONNAIRES
6. BECOMING EASILY ANNOYED OR IRRITABLE: NOT AT ALL
2. NOT BEING ABLE TO STOP OR CONTROL WORRYING: NOT AT ALL
5. BEING SO RESTLESS THAT IT IS HARD TO SIT STILL: NOT AT ALL
1. FEELING NERVOUS, ANXIOUS, OR ON EDGE: NOT AT ALL
3. WORRYING TOO MUCH ABOUT DIFFERENT THINGS: NOT AT ALL
4. TROUBLE RELAXING: NOT AT ALL
7. FEELING AFRAID AS IF SOMETHING AWFUL MIGHT HAPPEN: NOT AT ALL
GAD7 TOTAL SCORE: 0

## 2018-06-25 NOTE — PATIENT INSTRUCTIONS
Amlodipine 10 mg PO daily  Cozaar 100 mg PO daily(1 pill daily), NOT 1.5 pills daily  Lopressor 50 mg PO BID    Nurse visit for BP check in 2 weeks        HOW TO QUIT SMOKING  Smoking is one of the hardest habits to break. About half of all those who have ever smoked have been able to quit, and most of those (about 70%) who still smoke want to quit. Here are some of the best ways to stop smoking.     KEEP TRYING:  It takes most smokers about 8 tries before they are finally able to fully quit. So, the more often you try and fail, the better your chance of quitting the next time! So, don't give up!    GO COLD TURKEY:  Most ex-smokers quit cold turkey. Trying to cut back gradually doesn't seem to work as well, perhaps because it continues the smoking habit. Also, it is possible to fool yourself by inhaling more while smoking fewer cigarettes. This results in the same amount of nicotine in your body!    GET SUPPORT:  Support programs can make an important difference, especially for the heavy smoker. These groups offer lectures, methods to change your behavior and peer support. Call the free national Quitline for more information. 800-QUIT-NOW (311-122-8803). Low-cost or free programs are offered by many hospitals, local chapters of the American Lung Association (318-377-3388) and the American Cancer Society (016-060-2423). Support at home is important too. Non-smokers can help by offering praise and encouragement. If the smoker fails to quit, encourage them to try again!    OVER-THE-COUNTER MEDICINES:  For those who can't quit on their own, Nicotine Replacement Therapy (NRT) may make quitting much easier. Certain aids such as the nicotine patch, gum and lozenge are available without a prescription. However, it is best to use these under the guidance of your doctor. The skin patch provides a steady supply of nicotine to the body. Nicotine gum and lozenge gives temporary bursts of low levels of nicotine. Both methods take  the edge off the craving for cigarettes. WARNING: If you feel symptoms of nicotine overdose, such as nausea, vomiting, dizziness, weakness, or fast heartbeat, stop using these and see your doctor.    PRESCRIPTION MEDICINES:  After evaluating your smoking patterns and prior attempts at quitting, your doctor may offer a prescription medicine such as bupropion (Zyban, Wellbutrin), varenicline (Chantix, Champix), a niocotine inhaler or nasal spray. Each has its unique advantage and side effects which your doctor can review with you.    HEALTH BENEFITS OF QUITTING:  The benefits of quitting start right away and keep improving the longer you go without smokin minutes: blood pressure and pulse return to normal  8 hours: oxygen levels return to normal  2 days: ability to smell and taste begins to improve as damaged nerves start to regrow  2-3 weeks: circulation and lung function improves  1-9 months: decreased cough, congestion and shortness of breath; less tired  1 year: risk of heart attack decreases by half  5 years: risk of lung cancer decreases by half; risk of stroke becomes the same as a non-smoker  For information about how to quit smoking, visit the following links:  National Cancer [place] ,   Clearing the Air, Quit Smoking Today   - an online booklet. http://www.smokefree.gov/pubs/clearing_the_air.pdf  Smokefree.gov http://smokefree.gov/  QuitNet http://www.quitnet.com/    7509-8778 [place] [address]. All rights reserved. This information is not intended as a substitute for professional medical care. Always follow your healthcare professional's instructions.    The Benefits of Living Smoke Free  What do you want to gain from quitting? Check off some reasons to quit.  Health Benefits  ___ Reduce my risk of lung cancer, heart disease, chronic lung disease  ___ Have fewer wrinkles and softer skin  ___ Improve my sense of taste and smell  ___ For pregnant women reduce  the risk of having a miscarriage, stillbirth, premature birth, or low-birth-weight baby  Personal Benefits  ___ Feel more in control of my life  ___ Have better-smelling hair, breath, clothes, home, and car  ___ Save time by not having to take smoke breaks, buy cigarettes, or hunt for a light  ___ Have whiter teeth  Family Benefits  ___ Reduce my children s respiratory tract infections  ___ Set a good example for my children  ___ Reduce my family s cancer risk  Financial Benefits  ___ Save hundreds of dollars each year that would be spent on cigarettes  ___ Save money on medical bills  ___ Save on life, health, and car insurance premiums    Those Dollars Add Up!  Cigarettes are expensive, and getting more expensive all the time. Do you realize how much money you are spending on cigarettes per year? What is the average amount you spend on a pack of cigarettes? What is the average number of packs that you smoke per day? Using your answers to these questions, fill in this formula to help you find out:  ($ _____ per pack) ×  ( _____ number of packs per day) × (365 days) =  $ _____ yearly cost of smoking  Besides tobacco, there are other costs, including extra cleaning bills and replacement costs for clothing and furniture; medical expenses for smoking-related illnesses; and higher health, life, and car insurance premiums.    Cigars and Pipes Count Too!  Cigars and pipes are also dangerous. So are smokeless (chewing) tobacco and snuff. All of these products contain nicotine, a highly addictive substance that has harmful effects on your body. Quitting smoking means giving up all tobacco products.      2140-8153 [facility], [address] [place] [address], [place] [address]. All rights reserved. This information is not intended as a substitute for professional medical care. Always follow your healthcare professional's instructions.

## 2018-06-25 NOTE — MR AVS SNAPSHOT
After Visit Summary   6/25/2018    Aga Gary    MRN: [id number]           Patient Information     Date Of Birth          1950        Visit Information        Provider Department      6/25/2018 3:00 PM Senthil Aguirre DO [facility]        Today's Diagnoses     Benign essential hypertension    -  1    Tobacco abuse        Tobacco abuse counseling        Morbid obesity (H)          Care Instructions    Amlodipine 10 mg PO daily  Cozaar 100 mg PO daily(1 pill daily), NOT 1.5 pills daily  Lopressor 50 mg PO BID    Nurse visit for BP check in 2 weeks        HOW TO QUIT SMOKING  Smoking is one of the hardest habits to break. About half of all those who have ever smoked have been able to quit, and most of those (about 70%) who still smoke want to quit. Here are some of the best ways to stop smoking.     KEEP TRYING:  It takes most smokers about 8 tries before they are finally able to fully quit. So, the more often you try and fail, the better your chance of quitting the next time! So, don't give up!    GO COLD TURKEY:  Most ex-smokers quit cold turkey. Trying to cut back gradually doesn't seem to work as well, perhaps because it continues the smoking habit. Also, it is possible to fool yourself by inhaling more while smoking fewer cigarettes. This results in the same amount of nicotine in your body!    GET SUPPORT:  Support programs can make an important difference, especially for the heavy smoker. These groups offer lectures, methods to change your behavior and peer support. Call the free national Quitline for more information. 800-QUIT-NOW (597-614-1670). Low-cost or free programs are offered by many hospitals, local chapters of the American Lung Association (181-598-0242) and the American Cancer Society (204-249-0499). Support at home is important too. Non-smokers can help by offering praise and encouragement. If the smoker fails to quit, encourage them to try  again!    OVER-THE-COUNTER MEDICINES:  For those who can't quit on their own, Nicotine Replacement Therapy (NRT) may make quitting much easier. Certain aids such as the nicotine patch, gum and lozenge are available without a prescription. However, it is best to use these under the guidance of your doctor. The skin patch provides a steady supply of nicotine to the body. Nicotine gum and lozenge gives temporary bursts of low levels of nicotine. Both methods take the edge off the craving for cigarettes. WARNING: If you feel symptoms of nicotine overdose, such as nausea, vomiting, dizziness, weakness, or fast heartbeat, stop using these and see your doctor.    PRESCRIPTION MEDICINES:  After evaluating your smoking patterns and prior attempts at quitting, your doctor may offer a prescription medicine such as bupropion (Zyban, Wellbutrin), varenicline (Chantix, Champix), a niocotine inhaler or nasal spray. Each has its unique advantage and side effects which your doctor can review with you.    HEALTH BENEFITS OF QUITTING:  The benefits of quitting start right away and keep improving the longer you go without smokin minutes: blood pressure and pulse return to normal  8 hours: oxygen levels return to normal  2 days: ability to smell and taste begins to improve as damaged nerves start to regrow  2-3 weeks: circulation and lung function improves  1-9 months: decreased cough, congestion and shortness of breath; less tired  1 year: risk of heart attack decreases by half  5 years: risk of lung cancer decreases by half; risk of stroke becomes the same as a non-smoker  For information about how to quit smoking, visit the following links:  National Cancer [place] ,   Clearing the Air, Quit Smoking Today   - an online booklet. http://www.smokefree.gov/pubs/clearing_the_air.pdf  Smokefree.gov http://smokefree.gov/  QuitNet http://www.quitnet.com/    4535-6475 [address], [address] [facility], [place] [address]. All  rights reserved. This information is not intended as a substitute for professional medical care. Always follow your healthcare professional's instructions.    The Benefits of Living Smoke Free  What do you want to gain from quitting? Check off some reasons to quit.  Health Benefits  ___ Reduce my risk of lung cancer, heart disease, chronic lung disease  ___ Have fewer wrinkles and softer skin  ___ Improve my sense of taste and smell  ___ For pregnant women--reduce the risk of having a miscarriage, stillbirth, premature birth, or low-birth-weight baby  Personal Benefits  ___ Feel more in control of my life  ___ Have better-smelling hair, breath, clothes, home, and car  ___ Save time by not having to take smoke breaks, buy cigarettes, or hunt for a light  ___ Have whiter teeth  Family Benefits  ___ Reduce my children s respiratory tract infections  ___ Set a good example for my children  ___ Reduce my family s cancer risk  Financial Benefits  ___ Save hundreds of dollars each year that would be spent on cigarettes  ___ Save money on medical bills  ___ Save on life, health, and car insurance premiums    Those Dollars Add Up!  Cigarettes are expensive, and getting more expensive all the time. Do you realize how much money you are spending on cigarettes per year? What is the average amount you spend on a pack of cigarettes? What is the average number of packs that you smoke per day? Using your answers to these questions, fill in this formula to help you find out:  ($ _____ per pack) ×  ( _____ number of packs per day) × (365 days) =  $ _____ yearly cost of smoking  Besides tobacco, there are other costs, including extra cleaning bills and replacement costs for clothing and furniture; medical expenses for smoking-related illnesses; and higher health, life, and car insurance premiums.    Cigars and Pipes Count Too!  Cigars and pipes are also dangerous. So are smokeless (chewing) tobacco and snuff. All of these products  "contain nicotine, a highly addictive substance that has harmful effects on your body. Quitting smoking means giving up all tobacco products.      2669-5281 [facility], [address] [place] [address], [place] [address]. All rights reserved. This information is not intended as a substitute for professional medical care. Always follow your healthcare professional's instructions.          Follow-ups after your visit        Your next 10 appointments already scheduled     Aug 22, 2018  1:15 PM CDT   (Arrive by 1:00 PM)   SHORT with Ana Valdez NP   [facility] ([facility] )    [address] [address]   [phone]              Who to contact     If you have questions or need follow up information about today's clinic visit or your schedule please contact [facility] directly at [phone].  Normal or non-critical lab and imaging results will be communicated to you by MyChart, letter or phone within 4 business days after the clinic has received the results. If you do not hear from us within 7 days, please contact the clinic through MyChart or phone. If you have a critical or abnormal lab result, we will notify you by phone as soon as possible.  Submit refill requests through [employer] or call your pharmacy and they will forward the refill request to us. Please allow 3 business days for your refill to be completed.          Additional Information About Your Visit        Care EveryWhere ID     This is your Care EveryWhere ID. This could be used by other organizations to access your [place] medical records  [id number]        Your Vitals Were     Pulse Temperature Height Pulse Oximetry BMI (Body Mass Index)       58 97.6  F (36.4  C) (Tympanic) 5' 1.5\" (1.562 m) 93% 35.32 kg/m2        Blood Pressure from Last 3 Encounters:   06/25/18 150/80   05/23/18 148/90   05/16/18 122/80    Weight from Last 3 Encounters:   06/25/18 190 lb (86.2 kg)   05/23/18 191 " lb (86.6 kg)   05/09/18 192 lb (87.1 kg)              We Performed the Following     Tobacco Cessation - Order to Satisfy Health Maintenance          Today's Medication Changes          These changes are accurate as of 6/25/18  3:00 PM.  If you have any questions, ask your nurse or doctor.               Start taking these medicines.        Dose/Directions    amLODIPine 10 MG tablet   Commonly known as:  NORVASC   Used for:  Benign essential hypertension   Started by:  Senthil Aguirre DO        Dose:  10 mg   Take 1 tablet (10 mg) by mouth daily   Quantity:  30 tablet   Refills:  1         Stop taking these medicines if you haven't already. Please contact your care team if you have questions.     polyethylene glycol 236 g suspension   Commonly known as:  GoLYTELY/NuLYTELY   Stopped by:  Senthil Aguirre DO                Where to get your medicines      These medications were sent to [facility] - [place], [place] - [address]  [address] [place] [address]     Phone:  [phone]     amLODIPine 10 MG tablet                Primary Care Provider Office Phone # Fax #    Ana Valdez [phone] [address]  [place] [address]        Equal Access to Services     [facility] AH: Hadii aad ku hadasho Soomaali, waaxda luqadaha, qaybta kaalmada adeegyada, chandrika edward. So [facility] [phone].    ATENCIÓN: Si habla español, tiene a roy disposición servicios gratuitos de asistencia lingüística. Flavia al [phone].    We comply with applicable federal civil rights laws and [place] laws. We do not discriminate on the basis of race, color, national origin, age, disability, sex, sexual orientation, or gender identity.            Thank you!     Thank you for choosing [facility]  for your care. Our goal is always to provide you with excellent care. Hearing back from our patients is one way we can continue to improve our services. Please  take a few minutes to complete the written survey that you may receive in the mail after your visit with us. Thank you!             Your Updated Medication List - Protect others around you: Learn how to safely use, store and throw away your medicines at www.disposemymeds.org.          This list is accurate as of 6/25/18  3:00 PM.  Always use your most recent med list.                   Brand Name Dispense Instructions for use Diagnosis    amLODIPine 10 MG tablet    NORVASC    30 tablet    Take 1 tablet (10 mg) by mouth daily    Benign essential hypertension       aspirin 81 MG EC tablet     90 tablet    Take 1 tablet (81 mg) by mouth daily    Benign essential hypertension       cholecalciferol 5000 units Tabs tablet    vitamin D3    90 tablet    Take 1 tablet (5,000 Units) by mouth daily    Encounter to establish care       DAILY MULTIPLE VITAMIN/IRON Tabs     90 tablet    Take 1 tablet by mouth daily    Encounter to establish care       losartan 100 MG tablet    COZAAR    90 tablet    Take 1.5 po qd    Benign essential hypertension       metoprolol tartrate 50 MG tablet    LOPRESSOR    60 tablet    Take 1 tablet (50 mg) by mouth 2 times daily    Benign essential hypertension       nicotine 21 MG/24HR 24 hr patch    NICODERM CQ    28 patch    Place 1 patch onto the skin every 24 hours    Tobacco abuse       omega 3 1000 MG Caps     90 capsule    3 po daily    Encounter to establish care, Benign essential hypertension       simvastatin 20 MG tablet    ZOCOR    90 tablet    Take 1 tablet (20 mg) by mouth At Bedtime    Hyperlipidemia LDL goal <100

## 2018-06-26 LAB
ANION GAP SERPL CALCULATED.3IONS-SCNC: 6 MMOL/L (ref 3–14)
BUN SERPL-MCNC: 9 MG/DL (ref 7–30)
CALCIUM SERPL-MCNC: 9 MG/DL (ref 8.5–10.1)
CHLORIDE SERPL-SCNC: 109 MMOL/L (ref 94–109)
CO2 SERPL-SCNC: 25 MMOL/L (ref 20–32)
CREAT SERPL-MCNC: 0.71 MG/DL (ref 0.52–1.04)
GFR SERPL CREATININE-BSD FRML MDRD: 83 ML/MIN/1.7M2
GLUCOSE SERPL-MCNC: 90 MG/DL (ref 70–99)
POTASSIUM SERPL-SCNC: 4 MMOL/L (ref 3.4–5.3)
SODIUM SERPL-SCNC: 140 MMOL/L (ref 133–144)

## 2018-06-26 ASSESSMENT — ANXIETY QUESTIONNAIRES: GAD7 TOTAL SCORE: 0

## 2018-06-26 ASSESSMENT — PATIENT HEALTH QUESTIONNAIRE - PHQ9: SUM OF ALL RESPONSES TO PHQ QUESTIONS 1-9: 0

## 2018-07-16 ENCOUNTER — ALLIED HEALTH/NURSE VISIT (OUTPATIENT)
Dept: FAMILY MEDICINE | Facility: OTHER | Age: 68
End: 2018-07-16
Attending: NURSE PRACTITIONER
Payer: COMMERCIAL

## 2018-07-16 VITALS — DIASTOLIC BLOOD PRESSURE: 78 MMHG | HEART RATE: 66 BPM | SYSTOLIC BLOOD PRESSURE: 120 MMHG | OXYGEN SATURATION: 93 %

## 2018-07-16 DIAGNOSIS — I10 BENIGN ESSENTIAL HYPERTENSION: Primary | ICD-10-CM

## 2018-07-16 DIAGNOSIS — E78.5 HYPERLIPIDEMIA LDL GOAL <100: ICD-10-CM

## 2018-07-16 PROCEDURE — 99207 ZZC NO CHARGE NURSE ONLY: CPT

## 2018-07-16 NOTE — MR AVS SNAPSHOT
After Visit Summary   7/16/2018    Aga Gary    MRN: [id number]           Patient Information     Date Of Birth          1950        Visit Information        Provider Department      7/16/2018 2:45 PM [facility] NURSE [facility]        Today's Diagnoses     Benign essential hypertension    -  1       Follow-ups after your visit        Your next 10 appointments already scheduled     Aug 22, 2018  1:15 PM CDT   (Arrive by 1:00 PM)   SHORT with Ana Valdez NP   [facility] ([facility] )    [address]  [place] [address]   [phone]            Aug 28, 2018  1:30 PM CDT   (Arrive by 1:15 PM)   SHORT with Senthil Aguirre,    [facility] ([facility] )    [address]  [place] [id number] [phone]              Who to contact     If you have questions or need follow up information about today's clinic visit or your schedule please contact [facility] directly at [phone].  Normal or non-critical lab and imaging results will be communicated to you by MyChart, letter or phone within 4 business days after the clinic has received the results. If you do not hear from us within 7 days, please contact the clinic through MyChart or phone. If you have a critical or abnormal lab result, we will notify you by phone as soon as possible.  Submit refill requests through sabio labst or call your pharmacy and they will forward the refill request to us. Please allow 3 business days for your refill to be completed.          Additional Information About Your Visit        Care EveryWhere ID     This is your Care EveryWhere ID. This could be used by other organizations to access your [place] medical records  [id number]        Your Vitals Were     Pulse Pulse Oximetry                66 93%           Blood Pressure from Last 3 Encounters:   07/16/18 120/78   06/25/18  150/80   05/23/18 148/90    Weight from Last 3 Encounters:   06/25/18 190 lb (86.2 kg)   05/23/18 191 lb (86.6 kg)   05/09/18 192 lb (87.1 kg)              Today, you had the following     No orders found for display         Today's Medication Changes          These changes are accurate as of 7/16/18 11:59 PM.  If you have any questions, ask your nurse or doctor.               These medicines have changed or have updated prescriptions.        Dose/Directions    LOSARTAN POTASSIUM PO   This may have changed:  Another medication with the same name was removed. Continue taking this medication, and follow the directions you see here.        Dose:  100 mg   Take 100 mg by mouth daily   Refills:  0                Primary Care Provider Office Phone # Fax #    Ana ARA Valdez [phone] [address]  [place] [address]        Equal Access to Services     [facility] : Hadii simón Morelos, wanuha whittaker, qaybbrook kaalmatianna hauser, chandrika morrow . So [facility] [phone].    ATENCIÓN: Si habla español, tiene a roy disposición servicios gratuitos de asistencia lingüística. Llame al [phone].    We comply with applicable federal civil rights laws and [place] laws. We do not discriminate on the basis of race, color, national origin, age, disability, sex, sexual orientation, or gender identity.            Thank you!     Thank you for choosing [facility]  for your care. Our goal is always to provide you with excellent care. Hearing back from our patients is one way we can continue to improve our services. Please take a few minutes to complete the written survey that you may receive in the mail after your visit with us. Thank you!             Your Updated Medication List - Protect others around you: Learn how to safely use, store and throw away your medicines at www.disposemymeds.org.          This list is accurate as of 7/16/18 11:59 PM.   Always use your most recent med list.                   Brand Name Dispense Instructions for use Diagnosis    amLODIPine 10 MG tablet    NORVASC    30 tablet    Take 1 tablet (10 mg) by mouth daily    Benign essential hypertension       aspirin 81 MG EC tablet     90 tablet    Take 1 tablet (81 mg) by mouth daily    Benign essential hypertension       cholecalciferol 5000 units Tabs tablet    vitamin D3    90 tablet    Take 1 tablet (5,000 Units) by mouth daily    Encounter to establish care       DAILY MULTIPLE VITAMIN/IRON Tabs     90 tablet    Take 1 tablet by mouth daily    Encounter to establish care       LOSARTAN POTASSIUM PO      Take 100 mg by mouth daily        metoprolol tartrate 50 MG tablet    LOPRESSOR    60 tablet    Take 1 tablet (50 mg) by mouth 2 times daily    Benign essential hypertension       nicotine 21 MG/24HR 24 hr patch    NICODERM CQ    28 patch    Place 1 patch onto the skin every 24 hours    Tobacco abuse       omega 3 1000 MG Caps     90 capsule    3 po daily    Encounter to establish care, Benign essential hypertension       simvastatin 20 MG tablet    ZOCOR    90 tablet    Take 1 tablet (20 mg) by mouth At Bedtime    Hyperlipidemia LDL goal <100

## 2018-07-16 NOTE — TELEPHONE ENCOUNTER
Simvastatin      Last Written Prescription Date:  4/13/18  Last Fill Quantity: 90,   # refills: 1  Last Office Visit: 5/23/18  Future Office visit:    Next 5 appointments (look out 90 days)     Aug 22, 2018  1:15 PM CDT   (Arrive by 1:00 PM)   SHORT with Ana Valdez NP   [facility] ([facility] )    [address]  [facility] [address]   [phone]

## 2018-07-17 NOTE — PROGRESS NOTES
Pt came in for blood pressure check today. No chest pain, SOB or headaches to report, has been monitoring salt intake and medications have been changed: cozaar 100mg daily and norvasc 10mg daily. Pt was notified to come in for an office visit the end of august, which was scheduled. Blood pressure was 120/78 pulse 66.  Nya Solorio LPN

## 2018-07-18 RX ORDER — SIMVASTATIN 20 MG
TABLET ORAL
Qty: 90 TABLET | Refills: 1 | Status: SHIPPED | OUTPATIENT
Start: 2018-07-18 | End: 2019-01-12

## 2018-07-26 DIAGNOSIS — I10 BENIGN ESSENTIAL HYPERTENSION: ICD-10-CM

## 2018-07-27 RX ORDER — AMLODIPINE BESYLATE 10 MG/1
TABLET ORAL
Qty: 30 TABLET | Refills: 9 | Status: SHIPPED | OUTPATIENT
Start: 2018-07-27 | End: 2019-05-23

## 2018-08-08 DIAGNOSIS — I10 BENIGN ESSENTIAL HYPERTENSION: ICD-10-CM

## 2018-08-08 NOTE — TELEPHONE ENCOUNTER
metoprolol      Last Written Prescription Date:  5/9/18  Last Fill Quantity: 60,   # refills: 3  Last Office Visit: 6/25/18  Future Office visit:    Next 5 appointments (look out 90 days)     Aug 28, 2018  1:30 PM CDT   (Arrive by 1:15 PM)   SHORT with Senthil [facility] DO   [facility]    [address]  [facility] [id number] [phone]

## 2018-08-09 RX ORDER — METOPROLOL TARTRATE 50 MG
TABLET ORAL
Qty: 60 TABLET | Refills: 3 | Status: SHIPPED | OUTPATIENT
Start: 2018-08-09 | End: 2018-12-12

## 2018-08-28 ENCOUNTER — OFFICE VISIT (OUTPATIENT)
Dept: INTERNAL MEDICINE | Facility: OTHER | Age: 68
End: 2018-08-28
Attending: INTERNAL MEDICINE
Payer: COMMERCIAL

## 2018-08-28 VITALS
HEART RATE: 63 BPM | DIASTOLIC BLOOD PRESSURE: 72 MMHG | OXYGEN SATURATION: 94 % | TEMPERATURE: 98.2 F | WEIGHT: 200.6 LBS | SYSTOLIC BLOOD PRESSURE: 122 MMHG | BODY MASS INDEX: 36.91 KG/M2 | HEIGHT: 62 IN | RESPIRATION RATE: 20 BRPM

## 2018-08-28 DIAGNOSIS — E78.5 HYPERLIPIDEMIA LDL GOAL <130: ICD-10-CM

## 2018-08-28 DIAGNOSIS — Z71.6 TOBACCO ABUSE COUNSELING: ICD-10-CM

## 2018-08-28 DIAGNOSIS — R79.89 ELEVATED LFTS: ICD-10-CM

## 2018-08-28 DIAGNOSIS — Z72.0 TOBACCO ABUSE: ICD-10-CM

## 2018-08-28 DIAGNOSIS — I10 BENIGN ESSENTIAL HYPERTENSION: Primary | ICD-10-CM

## 2018-08-28 LAB
ALBUMIN SERPL-MCNC: 3.8 G/DL (ref 3.4–5)
ALP SERPL-CCNC: 83 U/L (ref 40–150)
ALT SERPL W P-5'-P-CCNC: 64 U/L (ref 0–50)
ANION GAP SERPL CALCULATED.3IONS-SCNC: 8 MMOL/L (ref 3–14)
AST SERPL W P-5'-P-CCNC: 46 U/L (ref 0–45)
BILIRUB SERPL-MCNC: 0.7 MG/DL (ref 0.2–1.3)
BUN SERPL-MCNC: 12 MG/DL (ref 7–30)
CALCIUM SERPL-MCNC: 9.4 MG/DL (ref 8.5–10.1)
CHLORIDE SERPL-SCNC: 108 MMOL/L (ref 94–109)
CO2 SERPL-SCNC: 25 MMOL/L (ref 20–32)
CREAT SERPL-MCNC: 0.72 MG/DL (ref 0.52–1.04)
GFR SERPL CREATININE-BSD FRML MDRD: 80 ML/MIN/1.7M2
GLUCOSE SERPL-MCNC: 117 MG/DL (ref 70–99)
POTASSIUM SERPL-SCNC: 4.2 MMOL/L (ref 3.4–5.3)
PROT SERPL-MCNC: 8.5 G/DL (ref 6.8–8.8)
SODIUM SERPL-SCNC: 141 MMOL/L (ref 133–144)

## 2018-08-28 PROCEDURE — 36415 COLL VENOUS BLD VENIPUNCTURE: CPT | Mod: ZL | Performed by: INTERNAL MEDICINE

## 2018-08-28 PROCEDURE — 80053 COMPREHEN METABOLIC PANEL: CPT | Mod: ZL | Performed by: INTERNAL MEDICINE

## 2018-08-28 PROCEDURE — G0463 HOSPITAL OUTPT CLINIC VISIT: HCPCS

## 2018-08-28 PROCEDURE — 99213 OFFICE O/P EST LOW 20 MIN: CPT | Performed by: INTERNAL MEDICINE

## 2018-08-28 ASSESSMENT — ANXIETY QUESTIONNAIRES
6. BECOMING EASILY ANNOYED OR IRRITABLE: SEVERAL DAYS
5. BEING SO RESTLESS THAT IT IS HARD TO SIT STILL: NOT AT ALL
7. FEELING AFRAID AS IF SOMETHING AWFUL MIGHT HAPPEN: NOT AT ALL
2. NOT BEING ABLE TO STOP OR CONTROL WORRYING: NOT AT ALL
3. WORRYING TOO MUCH ABOUT DIFFERENT THINGS: NOT AT ALL
IF YOU CHECKED OFF ANY PROBLEMS ON THIS QUESTIONNAIRE, HOW DIFFICULT HAVE THESE PROBLEMS MADE IT FOR YOU TO DO YOUR WORK, TAKE CARE OF THINGS AT HOME, OR GET ALONG WITH OTHER PEOPLE: NOT DIFFICULT AT ALL
4. TROUBLE RELAXING: NOT AT ALL
GAD7 TOTAL SCORE: 2
1. FEELING NERVOUS, ANXIOUS, OR ON EDGE: SEVERAL DAYS

## 2018-08-28 ASSESSMENT — PAIN SCALES - GENERAL: PAINLEVEL: NO PAIN (0)

## 2018-08-28 NOTE — PROGRESS NOTES
SUBJECTIVE:   Aga Gary is a 67 year old female who presents to clinic today for the following health issues:      Hyperlipidemia Follow-Up      Rate your low fat/cholesterol diet?: not monitoring fat    Taking statin?  Yes, no muscle aches from statin    Other lipid medications/supplements?:  Fish oil/Omega 3, dose 1000 mg daily without side effects    Hypertension Follow-up      Outpatient blood pressures are not being checked.    Low Salt Diet: no added salt      Amount of exercise or physical activity: 6-7 days/week for an average of less than 15 minutes    Problems taking medications regularly: No    Medication side effects: none    Diet: regular (no restrictions)    Aga presents today for follow up of her HTN.  We started Amlodipine last visit and her BP is at goal.  No chest pain or SOB.      Problem list and histories reviewed & adjusted, as indicated.  Additional history: as documented    Patient Active Problem List   Diagnosis     Benign essential hypertension     Tobacco abuse     Hyperlipidemia LDL goal <100     Taking a statin medication     Morbid obesity (H)     Past Surgical History:   Procedure Laterality Date     ABDOMEN SURGERY      2 c-sections     CHOLECYSTECTOMY  1994     GYN SURGERY      total hyst,, no cervix     HC REMOVAL OF NAIL PLATE SIMPLE SINGLE  04/19/2018    removal of 1/4  left gt toenail, local      HEAD & NECK SURGERY      tumor neck, benign     ORTHOPEDIC SURGERY Bilateral     carpul tunnel        Social History   Substance Use Topics     Smoking status: Heavy Tobacco Smoker     Packs/day: 1.00     Years: 40.00     Types: Cigarettes     Start date: 1/1/1972     Smokeless tobacco: Never Used      Comment: pt is slowly cutting down- 1 pack a day      Alcohol use No     Family History   Problem Relation Age of Onset     Other Cancer Mother      Other Cancer Brother      Breast Cancer Paternal Aunt          Current Outpatient Prescriptions   Medication Sig Dispense  "Refill     amLODIPine (NORVASC) 10 MG tablet TAKE 1 TABLET (10 MG) BY MOUTH DAILY 30 tablet 9     aspirin 81 MG EC tablet Take 1 tablet (81 mg) by mouth daily 90 tablet 3     cholecalciferol (VITAMIN D3) 5000 units TABS tablet Take 1 tablet (5,000 Units) by mouth daily 90 tablet 11     LOSARTAN POTASSIUM PO Take 100 mg by mouth daily       metoprolol tartrate (LOPRESSOR) 50 MG tablet TAKE 1 TABLET (50 MG) BY MOUTH 2 TIMES DAILY 60 tablet 3     Multiple Vitamins-Iron (DAILY MULTIPLE VITAMIN/IRON) TABS Take 1 tablet by mouth daily 90 tablet 11     nicotine (NICODERM CQ) 21 MG/24HR 24 hr patch Place 1 patch onto the skin every 24 hours 28 patch 5     omega 3 1000 MG CAPS 3 po daily (Patient taking differently: 1 capsule daily) 90 capsule 11     simvastatin (ZOCOR) 20 MG tablet TAKE 1 TABLET (20 MG) BY MOUTH AT BEDTIME 90 tablet 1     No Known Allergies  BP Readings from Last 3 Encounters:   08/28/18 122/72   07/16/18 120/78   06/25/18 150/80    Wt Readings from Last 3 Encounters:   08/28/18 200 lb 9.6 oz (91 kg)   06/25/18 190 lb (86.2 kg)   05/23/18 191 lb (86.6 kg)                    Reviewed and updated as needed this visit by clinical staff  Tobacco  Allergies  Meds  Problems       Reviewed and updated as needed this visit by Provider         ROS:  Constitutional, HEENT, cardiovascular, pulmonary, gi and gu systems are negative, except as otherwise noted.    OBJECTIVE:     /72 (BP Location: Right arm, Patient Position: Chair, Cuff Size: Adult Large)  Pulse 63  Temp 98.2  F (36.8  C) (Tympanic)  Resp 20  Ht 5' 1.5\" (1.562 m)  Wt 200 lb 9.6 oz (91 kg)  SpO2 94%  BMI 37.29 kg/m2  Body mass index is 37.29 kg/(m^2).   GENERAL: healthy, alert and no distress  NECK: No Bruits  RESP: lungs clear to auscultation - no rales, rhonchi or wheezes  CV: regular rate and rhythm, normal S1 S2, no S3 or S4, no murmur, click or rub, no peripheral edema and peripheral pulses strong  MS: no gross musculoskeletal " defects noted, no edema  SKIN: no suspicious lesions or rashes  NEURO: Normal strength and tone, mentation intact and speech normal  PSYCH: mentation appears normal, affect normal/bright    Diagnostic Test Results:  Results for orders placed or performed in visit on 06/25/18   Basic metabolic panel   Result Value Ref Range    Sodium 140 133 - 144 mmol/L    Potassium 4.0 3.4 - 5.3 mmol/L    Chloride 109 94 - 109 mmol/L    Carbon Dioxide 25 20 - 32 mmol/L    Anion Gap 6 3 - 14 mmol/L    Glucose 90 70 - 99 mg/dL    Urea Nitrogen 9 7 - 30 mg/dL    Creatinine 0.71 0.52 - 1.04 mg/dL    GFR Estimate 83 >60 mL/min/1.7m2    GFR Estimate If Black >90 >60 mL/min/1.7m2    Calcium 9.0 8.5 - 10.1 mg/dL       ASSESSMENT/PLAN:     Problem List Items Addressed This Visit     Benign essential hypertension - Primary    Relevant Orders    Comprehensive metabolic panel (BMP + Alb, Alk Phos, ALT, AST, Total. Bili, TP) (Completed)    Tobacco abuse    Relevant Orders    Tobacco Cessation - Order to Satisfy Health Maintenance (Completed)      Other Visit Diagnoses     Tobacco abuse counseling        Elevated LFTs:  Stable will follow.  Very mild likely AMNI.  Statin use does not seem to have worsened this.          Hyperlipidemia LDL goal <130:  Repeat FLP in 4-6 months.               Senthil Aguirre DO  [facility]

## 2018-08-28 NOTE — PATIENT INSTRUCTIONS

## 2018-08-28 NOTE — MR AVS SNAPSHOT
After Visit Summary   8/28/2018    Aga Gary    MRN: [id number]           Patient Information     Date Of Birth          1950        Visit Information        Provider Department      8/28/2018 1:30 PM Senthil Aguirre DO [facility]        Today's Diagnoses     Benign essential hypertension    -  1    Tobacco abuse        Tobacco abuse counseling        Elevated LFTs        Hyperlipidemia LDL goal <130          Care Instructions      HOW TO QUIT SMOKING  Smoking is one of the hardest habits to break. About half of all those who have ever smoked have been able to quit, and most of those (about 70%) who still smoke want to quit. Here are some of the best ways to stop smoking.     KEEP TRYING:  It takes most smokers about 8 tries before they are finally able to fully quit. So, the more often you try and fail, the better your chance of quitting the next time! So, don't give up!    GO COLD TURKEY:  Most ex-smokers quit cold turkey. Trying to cut back gradually doesn't seem to work as well, perhaps because it continues the smoking habit. Also, it is possible to fool yourself by inhaling more while smoking fewer cigarettes. This results in the same amount of nicotine in your body!    GET SUPPORT:  Support programs can make an important difference, especially for the heavy smoker. These groups offer lectures, methods to change your behavior and peer support. Call the free national Quitline for more information. 800-QUIT-NOW (986-531-4778). Low-cost or free programs are offered by many hospitals, local chapters of the American Lung Association (783-270-5635) and the American Cancer Society (288-374-7347). Support at home is important too. Non-smokers can help by offering praise and encouragement. If the smoker fails to quit, encourage them to try again!    OVER-THE-COUNTER MEDICINES:  For those who can't quit on their own, Nicotine Replacement Therapy (NRT) may make quitting  much easier. Certain aids such as the nicotine patch, gum and lozenge are available without a prescription. However, it is best to use these under the guidance of your doctor. The skin patch provides a steady supply of nicotine to the body. Nicotine gum and lozenge gives temporary bursts of low levels of nicotine. Both methods take the edge off the craving for cigarettes. WARNING: If you feel symptoms of nicotine overdose, such as nausea, vomiting, dizziness, weakness, or fast heartbeat, stop using these and see your doctor.    PRESCRIPTION MEDICINES:  After evaluating your smoking patterns and prior attempts at quitting, your doctor may offer a prescription medicine such as bupropion (Zyban, Wellbutrin), varenicline (Chantix, Champix), a niocotine inhaler or nasal spray. Each has its unique advantage and side effects which your doctor can review with you.    HEALTH BENEFITS OF QUITTING:  The benefits of quitting start right away and keep improving the longer you go without smokin minutes: blood pressure and pulse return to normal  8 hours: oxygen levels return to normal  2 days: ability to smell and taste begins to improve as damaged nerves start to regrow  2-3 weeks: circulation and lung function improves  1-9 months: decreased cough, congestion and shortness of breath; less tired  1 year: risk of heart attack decreases by half  5 years: risk of lung cancer decreases by half; risk of stroke becomes the same as a non-smoker  For information about how to quit smoking, visit the following links:  National Cancer [place] ,   Clearing the Air, Quit Smoking Today   - an online booklet. http://www.smokefree.gov/pubs/clearing_the_air.pdf  Smokefree.gov http://smokefree.gov/  QuitNet http://www.quitnet.com/    9732-2074 Hardy Boyd, [address] [place] [address], [place] [address]. All rights reserved. This information is not intended as a substitute for professional medical care. Always follow your healthcare  professional's instructions.    The Benefits of Living Smoke Free  What do you want to gain from quitting? Check off some reasons to quit.  Health Benefits  ___ Reduce my risk of lung cancer, heart disease, chronic lung disease  ___ Have fewer wrinkles and softer skin  ___ Improve my sense of taste and smell  ___ For pregnant women--reduce the risk of having a miscarriage, stillbirth, premature birth, or low-birth-weight baby  Personal Benefits  ___ Feel more in control of my life  ___ Have better-smelling hair, breath, clothes, home, and car  ___ Save time by not having to take smoke breaks, buy cigarettes, or hunt for a light  ___ Have whiter teeth  Family Benefits  ___ Reduce my children s respiratory tract infections  ___ Set a good example for my children  ___ Reduce my family s cancer risk  Financial Benefits  ___ Save hundreds of dollars each year that would be spent on cigarettes  ___ Save money on medical bills  ___ Save on life, health, and car insurance premiums    Those Dollars Add Up!  Cigarettes are expensive, and getting more expensive all the time. Do you realize how much money you are spending on cigarettes per year? What is the average amount you spend on a pack of cigarettes? What is the average number of packs that you smoke per day? Using your answers to these questions, fill in this formula to help you find out:  ($ _____ per pack) ×  ( _____ number of packs per day) × (365 days) =  $ _____ yearly cost of smoking  Besides tobacco, there are other costs, including extra cleaning bills and replacement costs for clothing and furniture; medical expenses for smoking-related illnesses; and higher health, life, and car insurance premiums.    Cigars and Pipes Count Too!  Cigars and pipes are also dangerous. So are smokeless (chewing) tobacco and snuff. All of these products contain nicotine, a highly addictive substance that has harmful effects on your body. Quitting smoking means giving up all tobacco  products.      7042-8307 Hardy Boyd, [address] [place] [address]. All rights reserved. This information is not intended as a substitute for professional medical care. Always follow your healthcare professional's instructions.          Follow-ups after your visit        Additional Services     QUITPLAN  Referral       [place] [employer] QUITLINES FAX FORM  Fax form to: [phone]    The clinic will facilitate the referral to the quitline.    Provider Information:  ===============================================================  Senthil Aguirre DO  ID#: [id number] - [facility] [phone] Fax: [phone]   [email or web address]  Payor: [employer] / Plan: [facility] FOR [facility]ORS / Product Type: HMO /   ===============================================================    The Public Health Service Guideline does not recommend providing over-the-counter nicotine replacement therapy products without physician authorization to patients with the following conditions: pregnancy, uncontrolled high blood pressure, or cardiovascular diseases.     I authorize the [place] [facility] to provide over-the-counter nicotine replacement products for the patient listed below if the patient's health plan benefits cover NRT or if the patient is eligible for QUITPLAN services.    Patient Consented to:  ===============================================================  - YES - I am ready to quit tobacco and request the above information be given to the quitline so they may contact me.  I understand that one of [place]'s Tobacco Quitlines will inform my provider about my participation.  ===============================================================  Please check the BEST 3-hour call window for them to reach you: 7am - 11am  May we leave a message?  NO  Language Preference:  English  Phone Number: Home Phone      [phone]  Mobile          None    "  E-mail Address: No e-mail address on record    ========================================================================  FOR QUITLINE USE ONLY:  THIS INFORMATION WILL BE PROVIDED BACK TO THE PROVIDER  Contact date: __/ __/__ or ____ Did not reach after three attempts.    Outcome:  __ Enrolled in telephone counseling program  __ Declined  __ Not Reached    Stage of readiness: _______________________  Planned Quit Date: ___/ ___/ ___  Comments:      2011 [facility]   This message funded by [employer] [place], an independent licensee of the Blue Cross and Blue Shield Association. Rev. 11/1/12                  Future tests that were ordered for you today     Open Future Orders        Priority Expected Expires Ordered    QUITPLAN  Referral Routine  10/27/2018 8/28/2018            Who to contact     If you have questions or need follow up information about today's clinic visit or your schedule please contact [facility] directly at [phone].  Normal or non-critical lab and imaging results will be communicated to you by MyChart, letter or phone within 4 business days after the clinic has received the results. If you do not hear from us within 7 days, please contact the clinic through MyChart or phone. If you have a critical or abnormal lab result, we will notify you by phone as soon as possible.  Submit refill requests through [email or web address] or call your pharmacy and they will forward the refill request to us. Please allow 3 business days for your refill to be completed.          Additional Information About Your Visit        Care EveryWhere ID     This is your Care EveryWhere ID. This could be used by other organizations to access your [facility] medical records  [id number]        Your Vitals Were     Pulse Temperature Respirations Height Pulse Oximetry BMI (Body Mass Index)    63 98.2  F (36.8  C) (Tympanic) 20 5' 1.5\" (1.562 m) 94% 37.29 kg/m2       Blood Pressure from " Last 3 Encounters:   08/28/18 122/72   07/16/18 120/78   06/25/18 150/80    Weight from Last 3 Encounters:   08/28/18 200 lb 9.6 oz (91 kg)   06/25/18 190 lb (86.2 kg)   05/23/18 191 lb (86.6 kg)              We Performed the Following     Comprehensive metabolic panel (BMP + Alb, Alk Phos, ALT, AST, Total. Bili, TP)     Tobacco Cessation - Order to Satisfy Health Maintenance          Today's Medication Changes          These changes are accurate as of 8/28/18  2:01 PM.  If you have any questions, ask your nurse or doctor.               These medicines have changed or have updated prescriptions.        Dose/Directions    omega 3 1000 MG Caps   This may have changed:  additional instructions   Used for:  Encounter to establish care, Benign essential hypertension        3 po daily   Quantity:  90 capsule   Refills:  11                Primary Care Provider Office Phone # Fax #    Ana ARA Valdez [phone] [address]  [place] [address]        Equal Access to Services     [facility]: Hadii simón ku hadasho Soomaali, waaxda luqadaha, qaybta kaalmada adeegyada, waxay go morrow . So [facility] [phone].    ATENCIÓN: Si habla español, tiene a roy disposición servicios gratuitos de asistencia lingüística. Llame al [phone].    We comply with applicable federal civil rights laws and [place] laws. We do not discriminate on the basis of race, color, national origin, age, disability, sex, sexual orientation, or gender identity.            Thank you!     Thank you for choosing [facility]  for your care. Our goal is always to provide you with excellent care. Hearing back from our patients is one way we can continue to improve our services. Please take a few minutes to complete the written survey that you may receive in the mail after your visit with us. Thank you!             Your Updated Medication List - Protect others around you: Learn how to safely  use, store and throw away your medicines at www.disposemymeds.org.          This list is accurate as of 8/28/18  2:01 PM.  Always use your most recent med list.                   Brand Name Dispense Instructions for use Diagnosis    amLODIPine 10 MG tablet    NORVASC    30 tablet    TAKE 1 TABLET (10 MG) BY MOUTH DAILY    Benign essential hypertension       aspirin 81 MG EC tablet     90 tablet    Take 1 tablet (81 mg) by mouth daily    Benign essential hypertension       cholecalciferol 5000 units Tabs tablet    vitamin D3    90 tablet    Take 1 tablet (5,000 Units) by mouth daily    Encounter to establish care       DAILY MULTIPLE VITAMIN/IRON Tabs     90 tablet    Take 1 tablet by mouth daily    Encounter to establish care       LOSARTAN POTASSIUM PO      Take 100 mg by mouth daily        metoprolol tartrate 50 MG tablet    LOPRESSOR    60 tablet    TAKE 1 TABLET (50 MG) BY MOUTH 2 TIMES DAILY    Benign essential hypertension       nicotine 21 MG/24HR 24 hr patch    NICODERM CQ    28 patch    Place 1 patch onto the skin every 24 hours    Tobacco abuse       omega 3 1000 MG Caps     90 capsule    3 po daily    Encounter to establish care, Benign essential hypertension       simvastatin 20 MG tablet    ZOCOR    90 tablet    TAKE 1 TABLET (20 MG) BY MOUTH AT BEDTIME    Hyperlipidemia LDL goal <100

## 2018-08-28 NOTE — NURSING NOTE
"Chief Complaint   Patient presents with     Hypertension     Lipids       Initial /72 (BP Location: Right arm, Patient Position: Chair, Cuff Size: Adult Large)  Pulse 63  Temp 98.2  F (36.8  C) (Tympanic)  Resp 20  Ht 5' 1.5\" (1.562 m)  Wt 200 lb 9.6 oz (91 kg)  SpO2 94%  BMI 37.29 kg/m2 Estimated body mass index is 37.29 kg/(m^2) as calculated from the following:    Height as of this encounter: 5' 1.5\" (1.562 m).    Weight as of this encounter: 200 lb 9.6 oz (91 kg).  Medication Reconciliation: complete    Pamela M. Lechevalier, LPN    "

## 2018-08-29 ASSESSMENT — ANXIETY QUESTIONNAIRES: GAD7 TOTAL SCORE: 2

## 2018-08-29 ASSESSMENT — PATIENT HEALTH QUESTIONNAIRE - PHQ9: SUM OF ALL RESPONSES TO PHQ QUESTIONS 1-9: 13

## 2018-12-12 DIAGNOSIS — I10 BENIGN ESSENTIAL HYPERTENSION: ICD-10-CM

## 2018-12-12 RX ORDER — METOPROLOL TARTRATE 50 MG
TABLET ORAL
Qty: 60 TABLET | Refills: 0 | Status: SHIPPED | OUTPATIENT
Start: 2018-12-12 | End: 2019-02-13

## 2018-12-12 NOTE — TELEPHONE ENCOUNTER
Metoprolol Tartrate      Last Written Prescription Date:  8/9/18  Last Fill Quantity: 60,   # refills: 3  Last Office Visit: 8/28/18  Future Office visit:    Next 5 appointments (look out 90 days)    Dec 28, 2018  1:45 PM CST  (Arrive by 1:30 PM)  SHORT with Ana Valdez NP  [facility] ([facility] ) [address] [place] [address]  [phone]   Feb 27, 2019  3:00 PM CST  (Arrive by 2:45 PM)  SHORT with Senthil Aguirre DO  [facility] ([facility] ) [address]  [place] [id number] [phone]

## 2019-01-12 DIAGNOSIS — E78.5 HYPERLIPIDEMIA LDL GOAL <100: ICD-10-CM

## 2019-01-14 RX ORDER — SIMVASTATIN 20 MG
TABLET ORAL
Qty: 90 TABLET | Refills: 1 | Status: SHIPPED | OUTPATIENT
Start: 2019-01-14 | End: 2019-07-16

## 2019-01-29 DIAGNOSIS — I10 BENIGN ESSENTIAL HYPERTENSION: ICD-10-CM

## 2019-01-29 RX ORDER — LOSARTAN POTASSIUM 100 MG/1
TABLET ORAL
Qty: 90 TABLET | Refills: 3 | Status: SHIPPED | OUTPATIENT
Start: 2019-01-29 | End: 2019-04-16

## 2019-02-13 DIAGNOSIS — I10 BENIGN ESSENTIAL HYPERTENSION: ICD-10-CM

## 2019-02-13 RX ORDER — METOPROLOL TARTRATE 50 MG
TABLET ORAL
Qty: 60 TABLET | Refills: 3 | Status: SHIPPED | OUTPATIENT
Start: 2019-02-13 | End: 2019-06-20

## 2019-02-13 NOTE — TELEPHONE ENCOUNTER
metoprolol tartrate (LOPRESSOR) 50 MG tablet  Last Written Prescription Date:  12/12/18  Last Fill Quantity: 60,   # refills: 0  Last Office Visit: 8/28/18  Future Office visit:    Next 5 appointments (look out 90 days)    Feb 27, 2019  3:00 PM CST  (Arrive by 2:45 PM)  SHORT with Senthil Aguirre DO  [facility] ([facility] ) [address]  [facility] [phone]

## 2019-04-16 ENCOUNTER — OFFICE VISIT (OUTPATIENT)
Dept: INTERNAL MEDICINE | Facility: OTHER | Age: 69
End: 2019-04-16
Attending: INTERNAL MEDICINE
Payer: COMMERCIAL

## 2019-04-16 VITALS
HEART RATE: 66 BPM | OXYGEN SATURATION: 94 % | SYSTOLIC BLOOD PRESSURE: 120 MMHG | DIASTOLIC BLOOD PRESSURE: 82 MMHG | WEIGHT: 209 LBS | BODY MASS INDEX: 38.85 KG/M2 | TEMPERATURE: 98.2 F

## 2019-04-16 DIAGNOSIS — E78.5 HYPERLIPIDEMIA LDL GOAL <130: ICD-10-CM

## 2019-04-16 DIAGNOSIS — I10 ESSENTIAL HYPERTENSION: Primary | ICD-10-CM

## 2019-04-16 PROCEDURE — 99213 OFFICE O/P EST LOW 20 MIN: CPT | Performed by: INTERNAL MEDICINE

## 2019-04-16 PROCEDURE — G0463 HOSPITAL OUTPT CLINIC VISIT: HCPCS | Mod: 25

## 2019-04-16 PROCEDURE — G0463 HOSPITAL OUTPT CLINIC VISIT: HCPCS

## 2019-04-16 RX ORDER — LOSARTAN POTASSIUM 100 MG/1
100 TABLET ORAL DAILY
COMMUNITY
End: 2020-01-14

## 2019-04-16 ASSESSMENT — PAIN SCALES - GENERAL: PAINLEVEL: NO PAIN (0)

## 2019-04-16 ASSESSMENT — ANXIETY QUESTIONNAIRES
7. FEELING AFRAID AS IF SOMETHING AWFUL MIGHT HAPPEN: NOT AT ALL
5. BEING SO RESTLESS THAT IT IS HARD TO SIT STILL: NOT AT ALL
GAD7 TOTAL SCORE: 0
4. TROUBLE RELAXING: NOT AT ALL
3. WORRYING TOO MUCH ABOUT DIFFERENT THINGS: NOT AT ALL
6. BECOMING EASILY ANNOYED OR IRRITABLE: NOT AT ALL
1. FEELING NERVOUS, ANXIOUS, OR ON EDGE: NOT AT ALL
2. NOT BEING ABLE TO STOP OR CONTROL WORRYING: NOT AT ALL

## 2019-04-16 ASSESSMENT — PATIENT HEALTH QUESTIONNAIRE - PHQ9: SUM OF ALL RESPONSES TO PHQ QUESTIONS 1-9: 0

## 2019-04-16 NOTE — PROGRESS NOTES
SUBJECTIVE:   Aga Gary is a 68 year old female who presents to clinic today for the following   health issues:      Hyperlipidemia Follow-Up      Rate your low fat/cholesterol diet?: not monitoring fat    Taking statin?  Yes, no muscle aches from statin    Other lipid medications/supplements?:  Fish oil/Omega 3, dose 3 without side effects    Hypertension Follow-up      Outpatient blood pressures are not being checked.    Low Salt Diet: low salt      Amount of exercise or physical activity: None    Problems taking medications regularly: No    Medication side effects: none    Diet: regular (no restrictions)    Aga presents today for follow up of her HTN and hyperlipidemia.  She denies any complaints of chest pain or SOB. Unfortunately she continues to smoke but is trying on and off to quit.       Additional history: as documented    Reviewed  and updated as needed this visit by clinical staff         Reviewed and updated as needed this visit by Provider         Patient Active Problem List   Diagnosis     Benign essential hypertension     Tobacco abuse     Hyperlipidemia LDL goal <100     Taking a statin medication     Morbid obesity (H)     Past Surgical History:   Procedure Laterality Date     ABDOMEN SURGERY      2 c-sections     CHOLECYSTECTOMY  1994     GYN SURGERY      total hyst,, no cervix     HC REMOVAL OF NAIL PLATE SIMPLE SINGLE  04/19/2018    removal of 1/4  left gt toenail, local      HEAD & NECK SURGERY      tumor neck, benign     ORTHOPEDIC SURGERY Bilateral     carpul tunnel        Social History     Tobacco Use     Smoking status: Heavy Tobacco Smoker     Packs/day: 1.00     Years: 40.00     Pack years: 40.00     Types: Cigarettes     Start date: 1/1/1972     Smokeless tobacco: Never Used     Tobacco comment: pt is slowly cutting down- 1 pack a day    Substance Use Topics     Alcohol use: No     Family History   Problem Relation Age of Onset     Other Cancer Mother      Other Cancer  Brother      Breast Cancer Paternal Aunt          Current Outpatient Medications   Medication Sig Dispense Refill     amLODIPine (NORVASC) 10 MG tablet TAKE 1 TABLET (10 MG) BY MOUTH DAILY 30 tablet 9     aspirin 81 MG EC tablet Take 1 tablet (81 mg) by mouth daily 90 tablet 3     cholecalciferol (VITAMIN D3) 5000 units TABS tablet Take 1 tablet (5,000 Units) by mouth daily 90 tablet 11     losartan (COZAAR) 100 MG tablet Take 100 mg by mouth daily       metoprolol tartrate (LOPRESSOR) 50 MG tablet TAKE 1 TABLET 2 TIMES DAILY FOR HEART AND BLOOD PRESSURE 60 tablet 3     Multiple Vitamins-Iron (DAILY MULTIPLE VITAMIN/IRON) TABS Take 1 tablet by mouth daily 90 tablet 11     omega 3 1000 MG CAPS 3 po daily (Patient taking differently: 1 capsule daily) 90 capsule 11     simvastatin (ZOCOR) 20 MG tablet TAKE 1 TABLET (20 MG) BY MOUTH AT BEDTIME 90 tablet 1     No Known Allergies  BP Readings from Last 3 Encounters:   04/16/19 120/82   08/28/18 122/72   07/16/18 120/78    Wt Readings from Last 3 Encounters:   04/16/19 94.8 kg (209 lb)   08/28/18 91 kg (200 lb 9.6 oz)   06/25/18 86.2 kg (190 lb)            ROS:  Constitutional, HEENT, cardiovascular, pulmonary, gi and gu systems are negative, except as otherwise noted.    OBJECTIVE:     /82   Pulse 66   Temp 98.2  F (36.8  C) (Tympanic)   Wt 94.8 kg (209 lb)   SpO2 94%   BMI 38.85 kg/m    Body mass index is 38.85 kg/m .   GENERAL: Alert and no distress  NECK: No bruits bilaterally    RESP: lungs clear to auscultation - no rales, rhonchi or wheezes  CV: regular rate and rhythm, normal S1 S2, no S3 or S4, no murmurs  MS: no gross musculoskeletal defects noted, no edema  SKIN: no suspicious lesions or rashes  NEURO: Normal strength and tone, mentation intact and speech normal  PSYCH: mentation appears normal, affect normal/bright    Diagnostic Test Results:  No results found for this or any previous visit (from the past 24 hour(s)).  Results for orders placed or  performed in visit on 08/28/18   Comprehensive metabolic panel (BMP + Alb, Alk Phos, ALT, AST, Total. Bili, TP)   Result Value Ref Range    Sodium 141 133 - 144 mmol/L    Potassium 4.2 3.4 - 5.3 mmol/L    Chloride 108 94 - 109 mmol/L    Carbon Dioxide 25 20 - 32 mmol/L    Anion Gap 8 3 - 14 mmol/L    Glucose 117 (H) 70 - 99 mg/dL    Urea Nitrogen 12 7 - 30 mg/dL    Creatinine 0.72 0.52 - 1.04 mg/dL    GFR Estimate 80 >60 mL/min/1.7m2    GFR Estimate If Black >90 >60 mL/min/1.7m2    Calcium 9.4 8.5 - 10.1 mg/dL    Bilirubin Total 0.7 0.2 - 1.3 mg/dL    Albumin 3.8 3.4 - 5.0 g/dL    Protein Total 8.5 6.8 - 8.8 g/dL    Alkaline Phosphatase 83 40 - 150 U/L    ALT 64 (H) 0 - 50 U/L    AST 46 (H) 0 - 45 U/L       ASSESSMENT/PLAN:     Problem List Items Addressed This Visit     None      Visit Diagnoses     Essential hypertension    -  Primary    Relevant Medications    losartan (COZAAR) 100 MG tablet    Other Relevant Orders    CBC with platelets and differential    Hyperlipidemia LDL goal <130        Relevant Orders    TSH with free T4 reflex    Comprehensive metabolic panel (BMP + Alb, Alk Phos, ALT, AST, Total. Bili, TP)    Lipid Profile (Chol, Trig, HDL, LDL calc)           Senthil Aguirre,   [facility]

## 2019-04-16 NOTE — NURSING NOTE
"Chief Complaint   Patient presents with     Lipids     Hypertension       Initial /82   Pulse 66   Temp 98.2  F (36.8  C) (Tympanic)   Wt 94.8 kg (209 lb)   SpO2 94%   BMI 38.85 kg/m   Estimated body mass index is 38.85 kg/m  as calculated from the following:    Height as of 8/28/18: 1.562 m (5' 1.5\").    Weight as of this encounter: 94.8 kg (209 lb).  Medication Reconciliation: complete    Nya Solorio LPN    "

## 2019-04-17 ASSESSMENT — ANXIETY QUESTIONNAIRES: GAD7 TOTAL SCORE: 0

## 2019-05-23 DIAGNOSIS — I10 BENIGN ESSENTIAL HYPERTENSION: ICD-10-CM

## 2019-05-23 RX ORDER — AMLODIPINE BESYLATE 10 MG/1
TABLET ORAL
Qty: 30 TABLET | Refills: 6 | Status: SHIPPED | OUTPATIENT
Start: 2019-05-23 | End: 2019-12-24

## 2019-07-16 DIAGNOSIS — E78.5 HYPERLIPIDEMIA LDL GOAL <100: ICD-10-CM

## 2019-07-16 RX ORDER — SIMVASTATIN 20 MG
TABLET ORAL
Qty: 30 TABLET | Refills: 0 | Status: SHIPPED | OUTPATIENT
Start: 2019-07-16 | End: 2019-11-22

## 2019-07-24 DIAGNOSIS — I10 ESSENTIAL HYPERTENSION: ICD-10-CM

## 2019-07-24 DIAGNOSIS — E78.5 HYPERLIPIDEMIA LDL GOAL <130: ICD-10-CM

## 2019-07-24 LAB
ALBUMIN SERPL-MCNC: 3.8 G/DL (ref 3.4–5)
ALP SERPL-CCNC: 89 U/L (ref 40–150)
ALT SERPL W P-5'-P-CCNC: 58 U/L (ref 0–50)
ANION GAP SERPL CALCULATED.3IONS-SCNC: 9 MMOL/L (ref 3–14)
AST SERPL W P-5'-P-CCNC: 40 U/L (ref 0–45)
BASOPHILS # BLD AUTO: 0 10E9/L (ref 0–0.2)
BASOPHILS NFR BLD AUTO: 0.3 %
BILIRUB SERPL-MCNC: 0.8 MG/DL (ref 0.2–1.3)
BUN SERPL-MCNC: 20 MG/DL (ref 7–30)
CALCIUM SERPL-MCNC: 9.5 MG/DL (ref 8.5–10.1)
CHLORIDE SERPL-SCNC: 108 MMOL/L (ref 94–109)
CHOLEST SERPL-MCNC: 169 MG/DL
CO2 SERPL-SCNC: 21 MMOL/L (ref 20–32)
CREAT SERPL-MCNC: 0.88 MG/DL (ref 0.52–1.04)
DIFFERENTIAL METHOD BLD: NORMAL
EOSINOPHIL # BLD AUTO: 0.2 10E9/L (ref 0–0.7)
EOSINOPHIL NFR BLD AUTO: 2.2 %
ERYTHROCYTE [DISTWIDTH] IN BLOOD BY AUTOMATED COUNT: 13.7 % (ref 10–15)
GFR SERPL CREATININE-BSD FRML MDRD: 67 ML/MIN/{1.73_M2}
GLUCOSE SERPL-MCNC: 150 MG/DL (ref 70–99)
HCT VFR BLD AUTO: 45.4 % (ref 35–47)
HDLC SERPL-MCNC: 38 MG/DL
HGB BLD-MCNC: 15.6 G/DL (ref 11.7–15.7)
LDLC SERPL CALC-MCNC: 99 MG/DL
LYMPHOCYTES # BLD AUTO: 4.1 10E9/L (ref 0.8–5.3)
LYMPHOCYTES NFR BLD AUTO: 39.5 %
MCH RBC QN AUTO: 31 PG (ref 26.5–33)
MCHC RBC AUTO-ENTMCNC: 34.4 G/DL (ref 31.5–36.5)
MCV RBC AUTO: 90 FL (ref 78–100)
MONOCYTES # BLD AUTO: 0.6 10E9/L (ref 0–1.3)
MONOCYTES NFR BLD AUTO: 5.3 %
NEUTROPHILS # BLD AUTO: 5.5 10E9/L (ref 1.6–8.3)
NEUTROPHILS NFR BLD AUTO: 52.7 %
NONHDLC SERPL-MCNC: 131 MG/DL
PLATELET # BLD AUTO: 180 10E9/L (ref 150–450)
POTASSIUM SERPL-SCNC: 4.1 MMOL/L (ref 3.4–5.3)
PROT SERPL-MCNC: 8.7 G/DL (ref 6.8–8.8)
RBC # BLD AUTO: 5.04 10E12/L (ref 3.8–5.2)
SODIUM SERPL-SCNC: 138 MMOL/L (ref 133–144)
TRIGL SERPL-MCNC: 158 MG/DL
TSH SERPL DL<=0.005 MIU/L-ACNC: 2.78 MU/L (ref 0.4–4)
WBC # BLD AUTO: 10.5 10E9/L (ref 4–11)

## 2019-07-24 PROCEDURE — 36415 COLL VENOUS BLD VENIPUNCTURE: CPT | Mod: ZL | Performed by: INTERNAL MEDICINE

## 2019-07-24 PROCEDURE — 84443 ASSAY THYROID STIM HORMONE: CPT | Mod: ZL | Performed by: INTERNAL MEDICINE

## 2019-07-24 PROCEDURE — 80061 LIPID PANEL: CPT | Mod: ZL | Performed by: INTERNAL MEDICINE

## 2019-07-24 PROCEDURE — 80053 COMPREHEN METABOLIC PANEL: CPT | Mod: ZL | Performed by: INTERNAL MEDICINE

## 2019-07-24 PROCEDURE — 85025 COMPLETE CBC W/AUTO DIFF WBC: CPT | Mod: ZL | Performed by: INTERNAL MEDICINE

## 2019-11-22 DIAGNOSIS — E78.5 HYPERLIPIDEMIA LDL GOAL <100: ICD-10-CM

## 2019-11-22 RX ORDER — SIMVASTATIN 20 MG
TABLET ORAL
Qty: 30 TABLET | Refills: 0 | Status: SHIPPED | OUTPATIENT
Start: 2019-11-22 | End: 2019-12-31

## 2019-12-16 ENCOUNTER — APPOINTMENT (OUTPATIENT)
Dept: CT IMAGING | Facility: HOSPITAL | Age: 69
End: 2019-12-16
Attending: PHYSICIAN ASSISTANT
Payer: COMMERCIAL

## 2019-12-16 ENCOUNTER — TELEPHONE (OUTPATIENT)
Dept: FAMILY MEDICINE | Facility: OTHER | Age: 69
End: 2019-12-16

## 2019-12-16 ENCOUNTER — HOSPITAL ENCOUNTER (EMERGENCY)
Facility: HOSPITAL | Age: 69
Discharge: HOME | End: 2019-12-16
Attending: PHYSICIAN ASSISTANT | Admitting: PHYSICIAN ASSISTANT
Payer: COMMERCIAL

## 2019-12-16 VITALS
OXYGEN SATURATION: 92 % | HEART RATE: 97 BPM | TEMPERATURE: 98.9 F | SYSTOLIC BLOOD PRESSURE: 148 MMHG | RESPIRATION RATE: 18 BRPM | DIASTOLIC BLOOD PRESSURE: 90 MMHG

## 2019-12-16 DIAGNOSIS — N20.0 KIDNEY STONE: ICD-10-CM

## 2019-12-16 LAB
ALBUMIN SERPL-MCNC: 3.7 G/DL (ref 3.4–5)
ALBUMIN UR-MCNC: 10 MG/DL
ALP SERPL-CCNC: 86 U/L (ref 40–150)
ALT SERPL W P-5'-P-CCNC: 45 U/L (ref 0–50)
ANION GAP SERPL CALCULATED.3IONS-SCNC: 9 MMOL/L (ref 3–14)
APPEARANCE UR: CLEAR
AST SERPL W P-5'-P-CCNC: 26 U/L (ref 0–45)
BACTERIA #/AREA URNS HPF: ABNORMAL /HPF
BASOPHILS # BLD AUTO: 0 10E9/L (ref 0–0.2)
BASOPHILS NFR BLD AUTO: 0.2 %
BILIRUB SERPL-MCNC: 1.2 MG/DL (ref 0.2–1.3)
BILIRUB UR QL STRIP: NEGATIVE
BUN SERPL-MCNC: 15 MG/DL (ref 7–30)
CALCIUM SERPL-MCNC: 8.9 MG/DL (ref 8.5–10.1)
CHLORIDE SERPL-SCNC: 99 MMOL/L (ref 94–109)
CO2 SERPL-SCNC: 22 MMOL/L (ref 20–32)
COLOR UR AUTO: ABNORMAL
CREAT SERPL-MCNC: 1.02 MG/DL (ref 0.52–1.04)
DIFFERENTIAL METHOD BLD: ABNORMAL
EOSINOPHIL # BLD AUTO: 0 10E9/L (ref 0–0.7)
EOSINOPHIL NFR BLD AUTO: 0.2 %
ERYTHROCYTE [DISTWIDTH] IN BLOOD BY AUTOMATED COUNT: 13.2 % (ref 10–15)
GFR SERPL CREATININE-BSD FRML MDRD: 56 ML/MIN/{1.73_M2}
GLUCOSE SERPL-MCNC: 228 MG/DL (ref 70–99)
GLUCOSE UR STRIP-MCNC: >1000 MG/DL
HCT VFR BLD AUTO: 43.4 % (ref 35–47)
HGB BLD-MCNC: 15.1 G/DL (ref 11.7–15.7)
HGB UR QL STRIP: ABNORMAL
IMM GRANULOCYTES # BLD: 0.2 10E9/L (ref 0–0.4)
IMM GRANULOCYTES NFR BLD: 0.9 %
KETONES UR STRIP-MCNC: 10 MG/DL
LACTATE BLD-SCNC: 1.8 MMOL/L (ref 0.7–2)
LACTATE BLD-SCNC: 2.4 MMOL/L (ref 0.7–2)
LEUKOCYTE ESTERASE UR QL STRIP: NEGATIVE
LIPASE SERPL-CCNC: 98 U/L (ref 73–393)
LYMPHOCYTES # BLD AUTO: 2.7 10E9/L (ref 0.8–5.3)
LYMPHOCYTES NFR BLD AUTO: 14.3 %
MCH RBC QN AUTO: 30 PG (ref 26.5–33)
MCHC RBC AUTO-ENTMCNC: 34.8 G/DL (ref 31.5–36.5)
MCV RBC AUTO: 86 FL (ref 78–100)
MONOCYTES # BLD AUTO: 0.8 10E9/L (ref 0–1.3)
MONOCYTES NFR BLD AUTO: 4.5 %
NEUTROPHILS # BLD AUTO: 15.1 10E9/L (ref 1.6–8.3)
NEUTROPHILS NFR BLD AUTO: 79.9 %
NITRATE UR QL: NEGATIVE
NRBC # BLD AUTO: 0 10*3/UL
NRBC BLD AUTO-RTO: 0 /100
PH UR STRIP: 6 PH (ref 4.7–8)
PLATELET # BLD AUTO: 162 10E9/L (ref 150–450)
POTASSIUM SERPL-SCNC: 3.5 MMOL/L (ref 3.4–5.3)
PROT SERPL-MCNC: 9 G/DL (ref 6.8–8.8)
RBC # BLD AUTO: 5.03 10E12/L (ref 3.8–5.2)
RBC #/AREA URNS AUTO: 5 /HPF (ref 0–2)
SODIUM SERPL-SCNC: 130 MMOL/L (ref 133–144)
SOURCE: ABNORMAL
SP GR UR STRIP: 1.02 (ref 1–1.03)
SQUAMOUS #/AREA URNS AUTO: 1 /HPF (ref 0–1)
UROBILINOGEN UR STRIP-MCNC: NORMAL MG/DL (ref 0–2)
WBC # BLD AUTO: 18.8 10E9/L (ref 4–11)
WBC #/AREA URNS AUTO: 1 /HPF (ref 0–5)

## 2019-12-16 PROCEDURE — 87040 BLOOD CULTURE FOR BACTERIA: CPT | Performed by: PHYSICIAN ASSISTANT

## 2019-12-16 PROCEDURE — 85025 COMPLETE CBC W/AUTO DIFF WBC: CPT | Performed by: PHYSICIAN ASSISTANT

## 2019-12-16 PROCEDURE — 36415 COLL VENOUS BLD VENIPUNCTURE: CPT | Performed by: PHYSICIAN ASSISTANT

## 2019-12-16 PROCEDURE — 25500064 ZZH RX 255 OP 636: Performed by: PHYSICIAN ASSISTANT

## 2019-12-16 PROCEDURE — 25800030 ZZH RX IP 258 OP 636: Performed by: PHYSICIAN ASSISTANT

## 2019-12-16 PROCEDURE — 80053 COMPREHEN METABOLIC PANEL: CPT | Performed by: PHYSICIAN ASSISTANT

## 2019-12-16 PROCEDURE — 81001 URINALYSIS AUTO W/SCOPE: CPT | Performed by: PHYSICIAN ASSISTANT

## 2019-12-16 PROCEDURE — 83605 ASSAY OF LACTIC ACID: CPT | Mod: 91 | Performed by: PHYSICIAN ASSISTANT

## 2019-12-16 PROCEDURE — 96375 TX/PRO/DX INJ NEW DRUG ADDON: CPT

## 2019-12-16 PROCEDURE — 96361 HYDRATE IV INFUSION ADD-ON: CPT

## 2019-12-16 PROCEDURE — 74177 CT ABD & PELVIS W/CONTRAST: CPT | Mod: TC

## 2019-12-16 PROCEDURE — 99284 EMERGENCY DEPT VISIT MOD MDM: CPT | Mod: Z6 | Performed by: PHYSICIAN ASSISTANT

## 2019-12-16 PROCEDURE — 25000132 ZZH RX MED GY IP 250 OP 250 PS 637: Performed by: PHYSICIAN ASSISTANT

## 2019-12-16 PROCEDURE — 99285 EMERGENCY DEPT VISIT HI MDM: CPT | Mod: 25

## 2019-12-16 PROCEDURE — 96374 THER/PROPH/DIAG INJ IV PUSH: CPT | Mod: XU

## 2019-12-16 PROCEDURE — 25000128 H RX IP 250 OP 636: Performed by: PHYSICIAN ASSISTANT

## 2019-12-16 PROCEDURE — 96376 TX/PRO/DX INJ SAME DRUG ADON: CPT

## 2019-12-16 PROCEDURE — 83690 ASSAY OF LIPASE: CPT | Performed by: PHYSICIAN ASSISTANT

## 2019-12-16 RX ORDER — MORPHINE SULFATE 4 MG/ML
4 INJECTION, SOLUTION INTRAMUSCULAR; INTRAVENOUS
Status: DISCONTINUED | OUTPATIENT
Start: 2019-12-16 | End: 2019-12-16 | Stop reason: HOSPADM

## 2019-12-16 RX ORDER — TAMSULOSIN HYDROCHLORIDE 0.4 MG/1
0.4 CAPSULE ORAL DAILY
Status: DISCONTINUED | OUTPATIENT
Start: 2019-12-16 | End: 2019-12-16 | Stop reason: HOSPADM

## 2019-12-16 RX ORDER — IOPAMIDOL 612 MG/ML
100 INJECTION, SOLUTION INTRAVASCULAR ONCE
Status: COMPLETED | OUTPATIENT
Start: 2019-12-16 | End: 2019-12-16

## 2019-12-16 RX ORDER — SODIUM CHLORIDE 9 MG/ML
1000 INJECTION, SOLUTION INTRAVENOUS CONTINUOUS
Status: DISCONTINUED | OUTPATIENT
Start: 2019-12-16 | End: 2019-12-16 | Stop reason: HOSPADM

## 2019-12-16 RX ORDER — KETOROLAC TROMETHAMINE 30 MG/ML
30 INJECTION, SOLUTION INTRAMUSCULAR; INTRAVENOUS ONCE
Status: COMPLETED | OUTPATIENT
Start: 2019-12-16 | End: 2019-12-16

## 2019-12-16 RX ORDER — HYDROCODONE BITARTRATE AND ACETAMINOPHEN 5; 325 MG/1; MG/1
1 TABLET ORAL EVERY 6 HOURS PRN
Qty: 12 TABLET | Refills: 0 | Status: SHIPPED | OUTPATIENT
Start: 2019-12-16 | End: 2020-01-14

## 2019-12-16 RX ORDER — ONDANSETRON 2 MG/ML
4 INJECTION INTRAMUSCULAR; INTRAVENOUS EVERY 30 MIN PRN
Status: DISCONTINUED | OUTPATIENT
Start: 2019-12-16 | End: 2019-12-16 | Stop reason: HOSPADM

## 2019-12-16 RX ADMIN — IOPAMIDOL 100 ML: 612 INJECTION, SOLUTION INTRAVENOUS at 17:33

## 2019-12-16 RX ADMIN — SODIUM CHLORIDE 1000 ML: 9 INJECTION, SOLUTION INTRAVENOUS at 17:20

## 2019-12-16 RX ADMIN — MORPHINE SULFATE 4 MG: 4 INJECTION, SOLUTION INTRAMUSCULAR; INTRAVENOUS at 17:44

## 2019-12-16 RX ADMIN — ONDANSETRON 4 MG: 2 INJECTION INTRAMUSCULAR; INTRAVENOUS at 17:06

## 2019-12-16 RX ADMIN — KETOROLAC TROMETHAMINE 30 MG: 30 INJECTION, SOLUTION INTRAMUSCULAR at 16:36

## 2019-12-16 RX ADMIN — MORPHINE SULFATE 4 MG: 4 INJECTION, SOLUTION INTRAMUSCULAR; INTRAVENOUS at 19:10

## 2019-12-16 RX ADMIN — TAMSULOSIN HYDROCHLORIDE 0.4 MG: 0.4 CAPSULE ORAL at 19:10

## 2019-12-16 ASSESSMENT — ENCOUNTER SYMPTOMS
RESPIRATORY NEGATIVE: 1
CARDIOVASCULAR NEGATIVE: 1
CONSTIPATION: 0
NEUROLOGICAL NEGATIVE: 1
NAUSEA: 1
ANAL BLEEDING: 0
BLOOD IN STOOL: 0
VOMITING: 1
DIARRHEA: 0
FATIGUE: 0
FEVER: 0
ABDOMINAL PAIN: 1

## 2019-12-16 NOTE — ED AVS SNAPSHOT
[facility]  [address]  [place] [address]  Phone:  [phone]                                    Aga Gary   MRN: [id number]    Department:  [facility]   Date of Visit:  12/16/2019           After Visit Summary Signature Page    I have received my discharge instructions, and my questions have been answered. I have discussed any challenges I see with this plan with the nurse or doctor.    ..........................................................................................................................................  Patient/Patient Representative Signature      ..........................................................................................................................................  Patient Representative Print Name and Relationship to Patient    ..................................................               ................................................  Date                                   Time    ..........................................................................................................................................  Reviewed by Signature/Title    ...................................................              ..............................................  Date                                               Time          22EPIC Rev 08/18

## 2019-12-16 NOTE — TELEPHONE ENCOUNTER
Patient daughter called stating patient has not had a BM in 2 days and has been complaining of pain in the lower left side of her back- radiating into the LLQ. States she has been having some dry heaving causing more abdominal pain. It was advised that the patient go to ED/UC due to [facility] Schedule being full and the severity of the symptoms. Ashley A. Lechevalier, LPN on 12/16/2019 at 9:08 AM

## 2019-12-16 NOTE — ED PROVIDER NOTES
History     Chief Complaint   Patient presents with     Flank Pain     HPI  Aga Gary is a 68 year old female who presents emergency department with complaints of left-sided flank pain and abdominal pain that started around 6 PM which she describes as 10 out of 10 in severity with no associated urinary symptoms, hematuria, fever.  Patient notes she has had significant nausea and vomiting.  She denies diarrhea, bloody stools.  No history of diverticulitis.  No personal history of kidney stones however both her brother and her daughter have had stones.  Patient smokes 1.5 ppd.  No personal or family history of aneurysm.  Denies urinary symptoms.  Patient has had prior cholecystectomy.  Also had polyps removed with last colonoscopy.      Allergies:  No Known Allergies    Problem List:    Patient Active Problem List    Diagnosis Date Noted     Morbid obesity (H) 06/25/2018     Priority: Medium     Benign essential hypertension 04/13/2018     Priority: Medium     Tobacco abuse 04/13/2018     Priority: Medium     Hyperlipidemia LDL goal <100 04/13/2018     Priority: Medium     Taking a statin medication 04/13/2018     Priority: Medium        Past Medical History:    Past Medical History:   Diagnosis Date     Benign essential hypertension 4/13/2018     Hyperlipidemia LDL goal <100 4/13/2018     Taking a statin medication 4/13/2018     Tobacco abuse 4/13/2018       Past Surgical History:    Past Surgical History:   Procedure Laterality Date     ABDOMEN SURGERY      2 c-sections     CHOLECYSTECTOMY  1994     GYN SURGERY      total hyst,, no cervix     HC REMOVAL OF NAIL PLATE SIMPLE SINGLE  04/19/2018    removal of 1/4  left gt toenail, local      HEAD & NECK SURGERY      tumor neck, benign     ORTHOPEDIC SURGERY Bilateral     carpul tunnel        Family History:    Family History   Problem Relation Age of Onset     Other Cancer Mother      Other Cancer Brother      Breast Cancer Paternal Aunt        Social  History:  Marital Status:   [2]  Social History     Tobacco Use     Smoking status: Heavy Tobacco Smoker     Packs/day: 1.00     Years: 40.00     Pack years: 40.00     Types: Cigarettes     Start date: 1/1/1972     Smokeless tobacco: Never Used     Tobacco comment: pt is slowly cutting down- 1 pack a day    Substance Use Topics     Alcohol use: No     Drug use: No        Medications:    amLODIPine (NORVASC) 10 MG tablet  aspirin 81 MG EC tablet  cholecalciferol (VITAMIN D3) 5000 units TABS tablet  HYDROcodone-acetaminophen (NORCO) 5-325 MG tablet  losartan (COZAAR) 100 MG tablet  metoprolol tartrate (LOPRESSOR) 50 MG tablet  Multiple Vitamins-Iron (DAILY MULTIPLE VITAMIN/IRON) TABS  omega 3 1000 MG CAPS  simvastatin (ZOCOR) 20 MG tablet          Review of Systems   Constitutional: Negative for fatigue and fever.   Respiratory: Negative.    Cardiovascular: Negative.    Gastrointestinal: Positive for abdominal pain, nausea and vomiting. Negative for anal bleeding, blood in stool, constipation and diarrhea.   Genitourinary: Negative.    Neurological: Negative.        Physical Exam   BP: 164/91  Pulse: 103  Heart Rate: 99  Temp: 97.2  F (36.2  C)  Resp: 20  SpO2: 94 %      Physical Exam  Constitutional:       General: She is not in acute distress.     Appearance: She is not diaphoretic.   HENT:      Head: Atraumatic.      Mouth/Throat:      Pharynx: No oropharyngeal exudate.   Eyes:      General: No scleral icterus.     Pupils: Pupils are equal, round, and reactive to light.   Cardiovascular:      Heart sounds: Normal heart sounds.   Pulmonary:      Effort: No respiratory distress.      Breath sounds: Normal breath sounds.   Abdominal:      General: Bowel sounds are normal.      Palpations: Abdomen is soft.      Tenderness: There is abdominal tenderness (epigastric, LUQ, LLQ, periumbilical regions). There is left CVA tenderness and guarding (left lateral to palpation). There is no right CVA tenderness.    Musculoskeletal:         General: No tenderness.   Skin:     General: Skin is warm.      Findings: No rash.         ED Course        Procedures    Lactate elevated at 2.4 with WBC elevated at 18.8K.  Concern for possible diverticulitis, kidney stone, pyelo, renal aneurysm.  CT shows kidney stone.  UA not highly suggestive of infection and patient is afebrile with normal HR and BP.  No concern for sepsis.      Repeat lactate 1.8 after 1 L NS.      Pain improved temporarily with 4 mg morphine.  Started having more pain prior to discharge and additional 4 mg given.  Patient started on Flomax.  May take up to 600 mg ibuprofen every 6 hours as needed for pain.  Take Norco as prescribed if symptoms persist.      Follow-up with urology if no improvement in symptoms in 7-10 days.                     Results for orders placed or performed during the hospital encounter of 12/16/19 (from the past 24 hour(s))   UA reflex to Microscopic and Culture   Result Value Ref Range    Color Urine Light Yellow     Appearance Urine Clear     Glucose Urine >1000 (A) NEG^Negative mg/dL    Bilirubin Urine Negative NEG^Negative    Ketones Urine 10 (A) NEG^Negative mg/dL    Specific Gravity Urine 1.019 1.003 - 1.035    Blood Urine Trace (A) NEG^Negative    pH Urine 6.0 4.7 - 8.0 pH    Protein Albumin Urine 10 (A) NEG^Negative mg/dL    Urobilinogen mg/dL Normal 0.0 - 2.0 mg/dL    Nitrite Urine Negative NEG^Negative    Leukocyte Esterase Urine Negative NEG^Negative    Source Midstream Urine     RBC Urine 5 (H) 0 - 2 /HPF    WBC Urine 1 0 - 5 /HPF    Bacteria Urine None (A) NEG^Negative /HPF    Squamous Epithelial /HPF Urine 1 0 - 1 /HPF   Comprehensive metabolic panel   Result Value Ref Range    Sodium 130 (L) 133 - 144 mmol/L    Potassium 3.5 3.4 - 5.3 mmol/L    Chloride 99 94 - 109 mmol/L    Carbon Dioxide 22 20 - 32 mmol/L    Anion Gap 9 3 - 14 mmol/L    Glucose 228 (H) 70 - 99 mg/dL    Urea Nitrogen 15 7 - 30 mg/dL    Creatinine 1.02 0.52 -  1.04 mg/dL    GFR Estimate 56 (L) >60 mL/min/[1.73_m2]    GFR Estimate If Black 65 >60 mL/min/[1.73_m2]    Calcium 8.9 8.5 - 10.1 mg/dL    Bilirubin Total 1.2 0.2 - 1.3 mg/dL    Albumin 3.7 3.4 - 5.0 g/dL    Protein Total 9.0 (H) 6.8 - 8.8 g/dL    Alkaline Phosphatase 86 40 - 150 U/L    ALT 45 0 - 50 U/L    AST 26 0 - 45 U/L   CBC with platelets differential   Result Value Ref Range    WBC 18.8 (H) 4.0 - 11.0 10e9/L    RBC Count 5.03 3.8 - 5.2 10e12/L    Hemoglobin 15.1 11.7 - 15.7 g/dL    Hematocrit 43.4 35.0 - 47.0 %    MCV 86 78 - 100 fl    MCH 30.0 26.5 - 33.0 pg    MCHC 34.8 31.5 - 36.5 g/dL    RDW 13.2 10.0 - 15.0 %    Platelet Count 162 150 - 450 10e9/L    Diff Method Automated Method     % Neutrophils 79.9 %    % Lymphocytes 14.3 %    % Monocytes 4.5 %    % Eosinophils 0.2 %    % Basophils 0.2 %    % Immature Granulocytes 0.9 %    Nucleated RBCs 0 0 /100    Absolute Neutrophil 15.1 (H) 1.6 - 8.3 10e9/L    Absolute Lymphocytes 2.7 0.8 - 5.3 10e9/L    Absolute Monocytes 0.8 0.0 - 1.3 10e9/L    Absolute Eosinophils 0.0 0.0 - 0.7 10e9/L    Absolute Basophils 0.0 0.0 - 0.2 10e9/L    Abs Immature Granulocytes 0.2 0 - 0.4 10e9/L    Absolute Nucleated RBC 0.0    Lactic acid whole blood   Result Value Ref Range    Lactic Acid 2.4 (H) 0.7 - 2.0 mmol/L   Lipase   Result Value Ref Range    Lipase 98 73 - 393 U/L   CT Abdomen Pelvis w Contrast    Narrative    PROCEDURE:  CT ABDOMEN PELVIS W CONTRAST    HISTORY: Abd pain, diverticulitis suspected; Abd pain, acute,  generalized    TECHNIQUE:  Helical CT of the abdomen and pelvis was performed  following injection of intravenous contrast.    COMPARISON:  None.    MEDS/CONTRAST: Isovue 300 100mL    FINDINGS:      Limited images through the lung bases demonstrate dependent  atelectasis. A small sliding hiatal hernia is questioned.    There is mild left hydronephrosis secondary to a 5 mm stone at the  left ureteropelvic junction. There is left perinephric fluid.  No  nonobstructive calculi are identified.    The liver is mildly heterogeneous without a discrete mass.  The  gallbladder is surgically absent. The spleen is borderline enlarged.  The pancreas and adrenal glands are unremarkable. The bowel is normal  in caliber. The appendix is normal.    No free air or adenopathy is present.  No suspicious osseous lesions  are identified.      Impression    IMPRESSION:      Mild left hydronephrosis secondary to a 5 mm stone at the left  ureteropelvic junction.    DRE MIRANDA MD   Lactic acid whole blood   Result Value Ref Range    Lactic Acid 1.8 0.7 - 2.0 mmol/L       Medications   ondansetron (ZOFRAN) injection 4 mg (4 mg Intravenous Given 12/16/19 1706)   morphine (PF) injection 4 mg (4 mg Intravenous Given 12/16/19 1910)   0.9% sodium chloride BOLUS (0 mLs Intravenous Stopped 12/16/19 1914)     Followed by   sodium chloride 0.9% infusion (has no administration in time range)   tamsulosin (FLOMAX) capsule 0.4 mg (0.4 mg Oral Given 12/16/19 1910)   ketorolac (TORADOL) injection 30 mg (30 mg Intravenous Given 12/16/19 1636)   sodium chloride (PF) 0.9% PF flush 60 mL (60 mLs Intravenous Given 12/16/19 1732)   iopamidol (ISOVUE-300) IV solution 61% 100 mL (100 mLs Intravenous Given 12/16/19 1733)       Assessments & Plan (with Medical Decision Making)     I have reviewed the nursing notes.    I have reviewed the findings, diagnosis, plan and need for follow up with the patient.    New Prescriptions    HYDROCODONE-ACETAMINOPHEN (NORCO) 5-325 MG TABLET    Take 1 tablet by mouth every 6 hours as needed for severe pain       Final diagnoses:   Kidney stone     ANABELL Marie on 12/16/2019 at 7:31 PM   12/16/2019   [facility]     Rick Francis PA  12/16/19 1931

## 2019-12-16 NOTE — ED NOTES
DATE:  12/16/2019   TIME OF RECEIPT FROM LAB:  1700  LAB TEST:  Lactic Acid   LAB VALUE:  2.4  RESULTS GIVEN WITH READ-BACK TO (PROVIDER): Carlos HUNG   TIME LAB VALUE REPORTED TO PROVIDER: 3901

## 2019-12-22 LAB
BACTERIA SPEC CULT: NORMAL
BACTERIA SPEC CULT: NORMAL
Lab: NORMAL
Lab: NORMAL
SPECIMEN SOURCE: NORMAL
SPECIMEN SOURCE: NORMAL

## 2019-12-31 DIAGNOSIS — E78.5 HYPERLIPIDEMIA LDL GOAL <100: ICD-10-CM

## 2019-12-31 RX ORDER — SIMVASTATIN 20 MG
TABLET ORAL
Qty: 30 TABLET | Refills: 0 | Status: SHIPPED | OUTPATIENT
Start: 2019-12-31 | End: 2020-01-08

## 2019-12-31 NOTE — TELEPHONE ENCOUNTER
Patient called to scheduled a follow up appointment from an ED visit and also mentioned she needed a refill on the Zocor. Medication pended for refill and appointment scheduled 1/3/19. Ashley A. Lechevalier, LPN on 12/31/2019 at 9:51 AM

## 2020-01-03 ENCOUNTER — OFFICE VISIT (OUTPATIENT)
Dept: FAMILY MEDICINE | Facility: OTHER | Age: 70
End: 2020-01-03
Attending: NURSE PRACTITIONER
Payer: COMMERCIAL

## 2020-01-03 VITALS
HEART RATE: 66 BPM | TEMPERATURE: 98.2 F | DIASTOLIC BLOOD PRESSURE: 68 MMHG | WEIGHT: 198.5 LBS | BODY MASS INDEX: 36.9 KG/M2 | SYSTOLIC BLOOD PRESSURE: 120 MMHG | OXYGEN SATURATION: 95 %

## 2020-01-03 DIAGNOSIS — Z71.6 TOBACCO ABUSE COUNSELING: ICD-10-CM

## 2020-01-03 DIAGNOSIS — N20.0 CALCULUS OF KIDNEY: ICD-10-CM

## 2020-01-03 DIAGNOSIS — R73.09 ELEVATED GLUCOSE: Primary | ICD-10-CM

## 2020-01-03 DIAGNOSIS — E78.5 HYPERLIPIDEMIA LDL GOAL <100: ICD-10-CM

## 2020-01-03 DIAGNOSIS — Z12.31 ENCOUNTER FOR SCREENING MAMMOGRAM FOR BREAST CANCER: ICD-10-CM

## 2020-01-03 DIAGNOSIS — R19.7 DIARRHEA, UNSPECIFIED TYPE: ICD-10-CM

## 2020-01-03 DIAGNOSIS — I10 BENIGN ESSENTIAL HYPERTENSION: ICD-10-CM

## 2020-01-03 DIAGNOSIS — Z79.899 TAKING A STATIN MEDICATION: ICD-10-CM

## 2020-01-03 DIAGNOSIS — Z72.0 TOBACCO ABUSE: ICD-10-CM

## 2020-01-03 LAB
ALBUMIN SERPL-MCNC: 3.7 G/DL (ref 3.4–5)
ALBUMIN UR-MCNC: NEGATIVE MG/DL
ALP SERPL-CCNC: 79 U/L (ref 40–150)
ALT SERPL W P-5'-P-CCNC: 50 U/L (ref 0–50)
ANION GAP SERPL CALCULATED.3IONS-SCNC: 7 MMOL/L (ref 3–14)
APPEARANCE UR: CLEAR
AST SERPL W P-5'-P-CCNC: 41 U/L (ref 0–45)
BASOPHILS # BLD AUTO: 0 10E9/L (ref 0–0.2)
BASOPHILS NFR BLD AUTO: 0.3 %
BILIRUB SERPL-MCNC: 0.6 MG/DL (ref 0.2–1.3)
BILIRUB UR QL STRIP: NEGATIVE
BUN SERPL-MCNC: 15 MG/DL (ref 7–30)
CALCIUM SERPL-MCNC: 9.4 MG/DL (ref 8.5–10.1)
CHLORIDE SERPL-SCNC: 107 MMOL/L (ref 94–109)
CHOLEST SERPL-MCNC: 200 MG/DL
CO2 SERPL-SCNC: 23 MMOL/L (ref 20–32)
COLOR UR AUTO: YELLOW
CREAT SERPL-MCNC: 0.86 MG/DL (ref 0.52–1.04)
DIFFERENTIAL METHOD BLD: ABNORMAL
EOSINOPHIL # BLD AUTO: 0.2 10E9/L (ref 0–0.7)
EOSINOPHIL NFR BLD AUTO: 1.9 %
ERYTHROCYTE [DISTWIDTH] IN BLOOD BY AUTOMATED COUNT: 13.6 % (ref 10–15)
EST. AVERAGE GLUCOSE BLD GHB EST-MCNC: 160 MG/DL
GFR SERPL CREATININE-BSD FRML MDRD: 69 ML/MIN/{1.73_M2}
GLUCOSE SERPL-MCNC: 132 MG/DL (ref 70–99)
GLUCOSE UR STRIP-MCNC: NEGATIVE MG/DL
HBA1C MFR BLD: 7.2 % (ref 0–5.6)
HCT VFR BLD AUTO: 44.7 % (ref 35–47)
HDLC SERPL-MCNC: 40 MG/DL
HGB BLD-MCNC: 14.7 G/DL (ref 11.7–15.7)
HGB UR QL STRIP: ABNORMAL
KETONES UR STRIP-MCNC: NEGATIVE MG/DL
LDLC SERPL CALC-MCNC: 119 MG/DL
LEUKOCYTE ESTERASE UR QL STRIP: ABNORMAL
LYMPHOCYTES # BLD AUTO: 4.5 10E9/L (ref 0.8–5.3)
LYMPHOCYTES NFR BLD AUTO: 38.2 %
MCH RBC QN AUTO: 29.8 PG (ref 26.5–33)
MCHC RBC AUTO-ENTMCNC: 32.9 G/DL (ref 31.5–36.5)
MCV RBC AUTO: 91 FL (ref 78–100)
MONOCYTES # BLD AUTO: 0.6 10E9/L (ref 0–1.3)
MONOCYTES NFR BLD AUTO: 4.9 %
NEUTROPHILS # BLD AUTO: 6.5 10E9/L (ref 1.6–8.3)
NEUTROPHILS NFR BLD AUTO: 54.7 %
NITRATE UR QL: NEGATIVE
NONHDLC SERPL-MCNC: 160 MG/DL
PH UR STRIP: 6 PH (ref 5–7)
PLATELET # BLD AUTO: 201 10E9/L (ref 150–450)
POTASSIUM SERPL-SCNC: 4 MMOL/L (ref 3.4–5.3)
PROT SERPL-MCNC: 8.5 G/DL (ref 6.8–8.8)
RBC # BLD AUTO: 4.94 10E12/L (ref 3.8–5.2)
RBC #/AREA URNS AUTO: NORMAL /HPF
SODIUM SERPL-SCNC: 137 MMOL/L (ref 133–144)
SOURCE: ABNORMAL
SP GR UR STRIP: <=1.005 (ref 1–1.03)
TRIGL SERPL-MCNC: 204 MG/DL
UROBILINOGEN UR STRIP-ACNC: 0.2 EU/DL (ref 0.2–1)
WBC # BLD AUTO: 11.8 10E9/L (ref 4–11)
WBC #/AREA URNS AUTO: NORMAL /HPF

## 2020-01-03 PROCEDURE — G0463 HOSPITAL OUTPT CLINIC VISIT: HCPCS

## 2020-01-03 PROCEDURE — 81001 URINALYSIS AUTO W/SCOPE: CPT | Mod: ZL | Performed by: NURSE PRACTITIONER

## 2020-01-03 PROCEDURE — 85025 COMPLETE CBC W/AUTO DIFF WBC: CPT | Mod: ZL | Performed by: NURSE PRACTITIONER

## 2020-01-03 PROCEDURE — 82043 UR ALBUMIN QUANTITATIVE: CPT | Mod: ZL | Performed by: NURSE PRACTITIONER

## 2020-01-03 PROCEDURE — 40000788 ZZHCL STATISTIC ESTIMATED AVERAGE GLUCOSE: Mod: ZL | Performed by: NURSE PRACTITIONER

## 2020-01-03 PROCEDURE — 80061 LIPID PANEL: CPT | Mod: ZL | Performed by: NURSE PRACTITIONER

## 2020-01-03 PROCEDURE — 99215 OFFICE O/P EST HI 40 MIN: CPT | Performed by: NURSE PRACTITIONER

## 2020-01-03 PROCEDURE — 36415 COLL VENOUS BLD VENIPUNCTURE: CPT | Mod: ZL | Performed by: NURSE PRACTITIONER

## 2020-01-03 PROCEDURE — 80053 COMPREHEN METABOLIC PANEL: CPT | Mod: ZL | Performed by: NURSE PRACTITIONER

## 2020-01-03 PROCEDURE — 83036 HEMOGLOBIN GLYCOSYLATED A1C: CPT | Mod: ZL | Performed by: NURSE PRACTITIONER

## 2020-01-03 ASSESSMENT — ANXIETY QUESTIONNAIRES
1. FEELING NERVOUS, ANXIOUS, OR ON EDGE: NOT AT ALL
6. BECOMING EASILY ANNOYED OR IRRITABLE: NOT AT ALL
2. NOT BEING ABLE TO STOP OR CONTROL WORRYING: NOT AT ALL
5. BEING SO RESTLESS THAT IT IS HARD TO SIT STILL: NOT AT ALL
7. FEELING AFRAID AS IF SOMETHING AWFUL MIGHT HAPPEN: NOT AT ALL
3. WORRYING TOO MUCH ABOUT DIFFERENT THINGS: NOT AT ALL
IF YOU CHECKED OFF ANY PROBLEMS ON THIS QUESTIONNAIRE, HOW DIFFICULT HAVE THESE PROBLEMS MADE IT FOR YOU TO DO YOUR WORK, TAKE CARE OF THINGS AT HOME, OR GET ALONG WITH OTHER PEOPLE: NOT DIFFICULT AT ALL
GAD7 TOTAL SCORE: 0

## 2020-01-03 ASSESSMENT — PATIENT HEALTH QUESTIONNAIRE - PHQ9
5. POOR APPETITE OR OVEREATING: NOT AT ALL
SUM OF ALL RESPONSES TO PHQ QUESTIONS 1-9: 0

## 2020-01-03 ASSESSMENT — PAIN SCALES - GENERAL: PAINLEVEL: NO PAIN (0)

## 2020-01-03 NOTE — NURSING NOTE
"Chief Complaint   Patient presents with     Hospital F/U       Initial /68 (BP Location: Left arm, Patient Position: Sitting, Cuff Size: Adult Large)   Pulse 66   Temp 98.2  F (36.8  C) (Tympanic)   Wt 90 kg (198 lb 8 oz)   SpO2 95%   BMI 36.90 kg/m   Estimated body mass index is 36.9 kg/m  as calculated from the following:    Height as of 8/28/18: 1.562 m (5' 1.5\").    Weight as of this encounter: 90 kg (198 lb 8 oz).  Medication Reconciliation: complete  Ashley A. Lechevalier, LPN  "

## 2020-01-03 NOTE — PATIENT INSTRUCTIONS
Assessment & Plan     1. Calculus of kidney  - UROLOGY ADULT REFERRAL    2. Elevated glucose  - Comprehensive metabolic panel (BMP + Alb, Alk Phos, ALT, AST, Total. Bili, TP)  - Hemoglobin A1c  - UA reflex to Microscopic and Culture - [facility]  - Albumin Random Urine Quantitative with Creat Ratio    3. Benign essential hypertension  - Comprehensive metabolic panel (BMP + Alb, Alk Phos, ALT, AST, Total. Bili, TP)    4. Hyperlipidemia LDL goal <100  - Comprehensive metabolic panel (BMP + Alb, Alk Phos, ALT, AST, Total. Bili, TP)  - Lipid Profile    5. Taking a statin medication  - CMP ordered    6. Diarrhea, unspecified type  - CBC with platelets differential  - Stool culture SSCE; Future  - Clostridium difficile toxin B PCR; Future  - Adenovirus antigen, stool; Future    7. Tobacco abuse  - Tobacco Cessation - Order to Satisfy Health Maintenance    8. Tobacco abuse counseling  - Tobacco Cessation - Order to Satisfy Health Maintenance    9. Encounter for screening mammogram for breast cancer  - MA Screening Digital Bilateral; Future         To ER with any concerns over the weekend  Fluids  Monitor for temp        Tobacco Cessation:   reports that she has been smoking cigarettes. She started smoking about 48 years ago. She has a 40.00 pack-year smoking history. She has never used smokeless tobacco.  Tobacco Cessation Action Plan: Information offered: Patient not interested at this time  Self help information given to patient        See Patient Instructions      Ana Valdez CNP  [facility]          HOW TO QUIT SMOKING  Smoking is one of the hardest habits to break. About half of all those who have ever smoked have been able to quit, and most of those (about 70%) who still smoke want to quit. Here are some of the best ways to stop smoking.     KEEP TRYING:  It takes most smokers about 8 tries before they are finally able to fully quit. So, the more often you try and fail, the better  your chance of quitting the next time! So, don't give up!    GO COLD TURKEY:  Most ex-smokers quit cold turkey. Trying to cut back gradually doesn't seem to work as well, perhaps because it continues the smoking habit. Also, it is possible to fool yourself by inhaling more while smoking fewer cigarettes. This results in the same amount of nicotine in your body!    GET SUPPORT:  Support programs can make an important difference, especially for the heavy smoker. These groups offer lectures, methods to change your behavior and peer support. Call the free national Quitline for more information. 800-QUIT-NOW (849-422-2494). Low-cost or free programs are offered by many hospitals, local chapters of the American Lung Association (177-835-9758) and the American Cancer Society (320-013-3972). Support at home is important too. Non-smokers can help by offering praise and encouragement. If the smoker fails to quit, encourage them to try again!    OVER-THE-COUNTER MEDICINES:  For those who can't quit on their own, Nicotine Replacement Therapy (NRT) may make quitting much easier. Certain aids such as the nicotine patch, gum and lozenge are available without a prescription. However, it is best to use these under the guidance of your doctor. The skin patch provides a steady supply of nicotine to the body. Nicotine gum and lozenge gives temporary bursts of low levels of nicotine. Both methods take the edge off the craving for cigarettes. WARNING: If you feel symptoms of nicotine overdose, such as nausea, vomiting, dizziness, weakness, or fast heartbeat, stop using these and see your doctor.    PRESCRIPTION MEDICINES:  After evaluating your smoking patterns and prior attempts at quitting, your doctor may offer a prescription medicine such as bupropion (Zyban, Wellbutrin), varenicline (Chantix, Champix), a niocotine inhaler or nasal spray. Each has its unique advantage and side effects which your doctor can review with you.    HEALTH  BENEFITS OF QUITTING:  The benefits of quitting start right away and keep improving the longer you go without smokin minutes: blood pressure and pulse return to normal  8 hours: oxygen levels return to normal  2 days: ability to smell and taste begins to improve as damaged nerves start to regrow  2-3 weeks: circulation and lung function improves  1-9 months: decreased cough, congestion and shortness of breath; less tired  1 year: risk of heart attack decreases by half  5 years: risk of lung cancer decreases by half; risk of stroke becomes the same as a non-smoker  For information about how to quit smoking, visit the following links:  [employer] [place] ,   Clearing the Air, Quit Smoking Today   - an online booklet. http://www.smokefree.gov/pubs/clearing_the_air.pdf  Smokefree.gov http://smokefree.gov/  QuitNet http://www.quitnet.com/    0936-0586 [employer] [facility], [address] [place] [address]. All rights reserved. This information is not intended as a substitute for professional medical care. Always follow your healthcare professional's instructions.    The Benefits of Living Smoke Free  What do you want to gain from quitting? Check off some reasons to quit.  Health Benefits  ___ Reduce my risk of lung cancer, heart disease, chronic lung disease  ___ Have fewer wrinkles and softer skin  ___ Improve my sense of taste and smell  ___ For pregnant women--reduce the risk of having a miscarriage, stillbirth, premature birth, or low-birth-weight baby  Personal Benefits  ___ Feel more in control of my life  ___ Have better-smelling hair, breath, clothes, home, and car  ___ Save time by not having to take smoke breaks, buy cigarettes, or hunt for a light  ___ Have whiter teeth  Family Benefits  ___ Reduce my children s respiratory tract infections  ___ Set a good example for my children  ___ Reduce my family s cancer risk  Financial Benefits  ___ Save hundreds of dollars each year that would be spent  on cigarettes  ___ Save money on medical bills  ___ Save on life, health, and car insurance premiums    Those Dollars Add Up!  Cigarettes are expensive, and getting more expensive all the time. Do you realize how much money you are spending on cigarettes per year? What is the average amount you spend on a pack of cigarettes? What is the average number of packs that you smoke per day? Using your answers to these questions, fill in this formula to help you find out:  ($ _____ per pack) ×  ( _____ number of packs per day) × (365 days) =  $ _____ yearly cost of smoking  Besides tobacco, there are other costs, including extra cleaning bills and replacement costs for clothing and furniture; medical expenses for smoking-related illnesses; and higher health, life, and car insurance premiums.    Cigars and Pipes Count Too!  Cigars and pipes are also dangerous. So are smokeless (chewing) tobacco and snuff. All of these products contain nicotine, a highly addictive substance that has harmful effects on your body. Quitting smoking means giving up all tobacco products.      4005-4331 [facility], [address] [place] [address], [place] [address]. All rights reserved. This information is not intended as a substitute for professional medical care. Always follow your healthcare professional's instructions.

## 2020-01-03 NOTE — PROGRESS NOTES
Aga Gary is a 69 year old female who presents to clinic today for the following health issues:      ED/UC Followup:    Facility: [facility]   Date of visit: 12/16/19  Reason for visit: Renal Calculi  Current Status: Weak and tired, Pain comes and goes but has improved, having diarrhea.         Denies flank pain, abdominal pain, no hematuria  Denies fever        ER note - 12/16/19    Flank Pain      HPI  Aga Gary is a 68 year old female who presents emergency department with complaints of left-sided flank pain and abdominal pain that started around 6 PM which she describes as 10 out of 10 in severity with no associated urinary symptoms, hematuria, fever.  Patient notes she has had significant nausea and vomiting.  She denies diarrhea, bloody stools.  No history of diverticulitis.  No personal history of kidney stones however both her brother and her daughter have had stones.  Patient smokes 1.5 ppd.  No personal or family history of aneurysm.  Denies urinary symptoms.  Patient has had prior cholecystectomy.  Also had polyps removed with last colonoscopy.                Results for orders placed or performed during the hospital encounter of 12/16/19 (from the past 24 hour(s))   UA reflex to Microscopic and Culture   Result Value Ref Range     Color Urine Light Yellow       Appearance Urine Clear       Glucose Urine >1000 (A) NEG^Negative mg/dL     Bilirubin Urine Negative NEG^Negative     Ketones Urine 10 (A) NEG^Negative mg/dL     Specific Gravity Urine 1.019 1.003 - 1.035     Blood Urine Trace (A) NEG^Negative     pH Urine 6.0 4.7 - 8.0 pH     Protein Albumin Urine 10 (A) NEG^Negative mg/dL     Urobilinogen mg/dL Normal 0.0 - 2.0 mg/dL     Nitrite Urine Negative NEG^Negative     Leukocyte Esterase Urine Negative NEG^Negative     Source Midstream Urine       RBC Urine 5 (H) 0 - 2 /HPF     WBC Urine 1 0 - 5 /HPF     Bacteria Urine None (A) NEG^Negative /HPF     Squamous Epithelial /HPF Urine 1 0 - 1 /HPF    Comprehensive metabolic panel   Result Value Ref Range     Sodium 130 (L) 133 - 144 mmol/L     Potassium 3.5 3.4 - 5.3 mmol/L     Chloride 99 94 - 109 mmol/L     Carbon Dioxide 22 20 - 32 mmol/L     Anion Gap 9 3 - 14 mmol/L     Glucose 228 (H) 70 - 99 mg/dL     Urea Nitrogen 15 7 - 30 mg/dL     Creatinine 1.02 0.52 - 1.04 mg/dL     GFR Estimate 56 (L) >60 mL/min/[1.73_m2]     GFR Estimate If Black 65 >60 mL/min/[1.73_m2]     Calcium 8.9 8.5 - 10.1 mg/dL     Bilirubin Total 1.2 0.2 - 1.3 mg/dL     Albumin 3.7 3.4 - 5.0 g/dL     Protein Total 9.0 (H) 6.8 - 8.8 g/dL     Alkaline Phosphatase 86 40 - 150 U/L     ALT 45 0 - 50 U/L     AST 26 0 - 45 U/L   CBC with platelets differential   Result Value Ref Range     WBC 18.8 (H) 4.0 - 11.0 10e9/L     RBC Count 5.03 3.8 - 5.2 10e12/L     Hemoglobin 15.1 11.7 - 15.7 g/dL     Hematocrit 43.4 35.0 - 47.0 %     MCV 86 78 - 100 fl     MCH 30.0 26.5 - 33.0 pg     MCHC 34.8 31.5 - 36.5 g/dL     RDW 13.2 10.0 - 15.0 %     Platelet Count 162 150 - 450 10e9/L     Diff Method Automated Method       % Neutrophils 79.9 %     % Lymphocytes 14.3 %     % Monocytes 4.5 %     % Eosinophils 0.2 %     % Basophils 0.2 %     % Immature Granulocytes 0.9 %     Nucleated RBCs 0 0 /100     Absolute Neutrophil 15.1 (H) 1.6 - 8.3 10e9/L     Absolute Lymphocytes 2.7 0.8 - 5.3 10e9/L     Absolute Monocytes 0.8 0.0 - 1.3 10e9/L     Absolute Eosinophils 0.0 0.0 - 0.7 10e9/L     Absolute Basophils 0.0 0.0 - 0.2 10e9/L     Abs Immature Granulocytes 0.2 0 - 0.4 10e9/L     Absolute Nucleated RBC 0.0     Lactic acid whole blood   Result Value Ref Range     Lactic Acid 2.4 (H) 0.7 - 2.0 mmol/L   Lipase   Result Value Ref Range     Lipase 98 73 - 393 U/L   CT Abdomen Pelvis w Contrast     Narrative     PROCEDURE:  CT ABDOMEN PELVIS W CONTRAST     HISTORY: Abd pain, diverticulitis suspected; Abd pain, acute,  generalized     TECHNIQUE:  Helical CT of the abdomen and pelvis was performed  following injection of  intravenous contrast.     COMPARISON:  None.     MEDS/CONTRAST: Isovue 300 100mL     FINDINGS:       Limited images through the lung bases demonstrate dependent  atelectasis. A small sliding hiatal hernia is questioned.     There is mild left hydronephrosis secondary to a 5 mm stone at the  left ureteropelvic junction. There is left perinephric fluid. No  nonobstructive calculi are identified.     The liver is mildly heterogeneous without a discrete mass.  The  gallbladder is surgically absent. The spleen is borderline enlarged.  The pancreas and adrenal glands are unremarkable. The bowel is normal  in caliber. The appendix is normal.     No free air or adenopathy is present.  No suspicious osseous lesions  are identified.        Impression     IMPRESSION:       Mild left hydronephrosis secondary to a 5 mm stone at the left  ureteropelvic junction.     DRE MIRANDA MD   Lactic acid whole blood   Result Value Ref Range     Lactic Acid 1.8 0.7 - 2.0 mmol/L           ED Course      Procedures     Lactate elevated at 2.4 with WBC elevated at 18.8K.  Concern for possible diverticulitis, kidney stone, pyelo, renal aneurysm.  CT shows kidney stone.  UA not highly suggestive of infection and patient is afebrile with normal HR and BP.  No concern for sepsis.       Repeat lactate 1.8 after 1 L NS.       Pain improved temporarily with 4 mg morphine.  Started having more pain prior to discharge and additional 4 mg given.  Patient started on Flomax.  May take up to 600 mg ibuprofen every 6 hours as needed for pain.  Take Norco as prescribed if symptoms persist.       Follow-up with urology if no improvement in symptoms in 7-10 days.          Diarrhea  Daily - since ER visit  No vomiting, no nausea, no cramping  Stools are watery, multiple daily      Elevated Glucose noted at ER    BP Readings from Last 2 Encounters:   01/03/20 120/68   12/16/19 148/90     LDL Cholesterol Calculated (mg/dL)   Date Value   07/24/2019 99    05/16/2018 101 (H)     Diabetes Management Resources        Hyperlipidemia Follow-Up    Are you regularly taking any medication or supplement to lower your cholesterol?   Yes- Zocor    Are you having muscle aches or other side effects that you think could be caused by your cholesterol lowering medication?  No      Hypertension Follow-up    Do you check your blood pressure regularly outside of the clinic? No     Are you following a low salt diet? No    Are your blood pressures ever more than 140 on the top number (systolic) OR more   than 90 on the bottom number (diastolic), for example 140/90? Yes      Patient Active Problem List   Diagnosis     Benign essential hypertension     Tobacco abuse     Hyperlipidemia LDL goal <100     Taking a statin medication     Morbid obesity (H)     Calculus of kidney     Past Surgical History:   Procedure Laterality Date     ABDOMEN SURGERY      2 c-sections     CHOLECYSTECTOMY  1994     GYN SURGERY      total hyst,, no cervix     HC REMOVAL OF NAIL PLATE SIMPLE SINGLE  04/19/2018    removal of 1/4  left gt toenail, local      HEAD & NECK SURGERY      tumor neck, benign     ORTHOPEDIC SURGERY Bilateral     carpul tunnel        Social History     Tobacco Use     Smoking status: Heavy Tobacco Smoker     Packs/day: 1.00     Years: 40.00     Pack years: 40.00     Types: Cigarettes     Start date: 1/1/1972     Smokeless tobacco: Never Used     Tobacco comment: pt is slowly cutting down- 1 pack a day    Substance Use Topics     Alcohol use: No     Family History   Problem Relation Age of Onset     Other Cancer Mother      Other Cancer Brother      Breast Cancer Paternal Aunt          Current Outpatient Medications   Medication Sig Dispense Refill     amLODIPine (NORVASC) 10 MG tablet TAKE 1 TABLET (10 MG) BY MOUTH DAILY 30 tablet 6     aspirin 81 MG EC tablet Take 1 tablet (81 mg) by mouth daily 90 tablet 3     cholecalciferol (VITAMIN D3) 5000 units TABS tablet Take 1 tablet (5,000  Units) by mouth daily 90 tablet 11     HYDROcodone-acetaminophen (NORCO) 5-325 MG tablet Take 1 tablet by mouth every 6 hours as needed for severe pain 12 tablet 0     losartan (COZAAR) 100 MG tablet Take 100 mg by mouth daily       metoprolol tartrate (LOPRESSOR) 50 MG tablet TAKE 1 TABLET 2 TIMES DAILY FOR HEART AND BLOOD PRESSURE 60 tablet 3     Multiple Vitamins-Iron (DAILY MULTIPLE VITAMIN/IRON) TABS Take 1 tablet by mouth daily 90 tablet 11     omega 3 1000 MG CAPS 3 po daily (Patient taking differently: 1 capsule daily) 90 capsule 11     simvastatin (ZOCOR) 20 MG tablet TAKE 1 TABLET (20 MG) BY MOUTH AT BEDTIME 30 tablet 0     No Known Allergies  Recent Labs   Lab Test 12/16/19  1628 07/24/19  1323 08/28/18  1357  05/16/18  1347 04/13/18  1201   LDL  --  99  --   --  101* 156*   HDL  --  38*  --   --  39* 38*   TRIG  --  158*  --   --  141 174*   ALT 45 58* 64*  --  52* 65*   CR 1.02 0.88 0.72   < > 0.70 0.81   GFRESTIMATED 56* 67 80   < > 83 71   GFRESTBLACK 65 78 >90   < > >90 86   POTASSIUM 3.5 4.1 4.2   < > 4.1 4.1   TSH  --  2.78  --   --   --  5.58*    < > = values in this interval not displayed.      BP Readings from Last 3 Encounters:   01/03/20 120/68   12/16/19 148/90   04/16/19 120/82    Wt Readings from Last 3 Encounters:   01/03/20 90 kg (198 lb 8 oz)   04/16/19 94.8 kg (209 lb)   08/28/18 91 kg (200 lb 9.6 oz)                  Reviewed and updated as needed this visit by Provider         Review of Systems   ROS COMP: Constitutional, HEENT, cardiovascular, pulmonary, GI, , musculoskeletal, neuro, skin, endocrine and psych systems are negative, except as otherwise noted.        Objective    /68 (BP Location: Left arm, Patient Position: Sitting, Cuff Size: Adult Large)   Pulse 66   Temp 98.2  F (36.8  C) (Tympanic)   Wt 90 kg (198 lb 8 oz)   SpO2 95%   BMI 36.90 kg/m    Body mass index is 36.9 kg/m .       Physical Exam   GENERAL: healthy, alert and no distress  EYES: Eyes grossly  normal to inspection, PERRL and conjunctivae and sclerae normal  HENT: ear canals and TM's normal, nose and mouth without ulcers or lesions  NECK: no adenopathy, no asymmetry, masses, or scars and thyroid normal to palpation  RESP: lungs clear to auscultation - no rales, rhonchi or wheezes  CV: regular rate and rhythm, normal S1 S2, no S3 or S4, no murmur, click or rub, no peripheral edema and peripheral pulses strong  ABDOMEN: Mild RUQ tenderness, normal bowel sounds  SKIN: no suspicious lesions or rashes  PSYCH: mentation appears normal, affect normal/bright        CT 12/16/19  PROCEDURE:  CT ABDOMEN PELVIS W CONTRAST     HISTORY: Abd pain, diverticulitis suspected; Abd pain, acute,  generalized     TECHNIQUE:  Helical CT of the abdomen and pelvis was performed  following injection of intravenous contrast.     COMPARISON:  None.     MEDS/CONTRAST: Isovue 300 100mL     FINDINGS:       Limited images through the lung bases demonstrate dependent  atelectasis. A small sliding hiatal hernia is questioned.     There is mild left hydronephrosis secondary to a 5 mm stone at the  left ureteropelvic junction. There is left perinephric fluid. No  nonobstructive calculi are identified.     The liver is mildly heterogeneous without a discrete mass.  The  gallbladder is surgically absent. The spleen is borderline enlarged.  The pancreas and adrenal glands are unremarkable. The bowel is normal  in caliber. The appendix is normal.     No free air or adenopathy is present.  No suspicious osseous lesions  are identified.                                                                      IMPRESSION:       Mild left hydronephrosis secondary to a 5 mm stone at the left  ureteropelvic junction.     DRE MIRANDA MD        Visit time:     Over 40 minutes  spent with the patient.   Greater than 50% of our visit time was spent face to face and included patient education and counseling regarding current conditions, and disease  management.      Medication management, and plan of care.    HTN - Discussed diet, exercise, compliance  Glucose elevation - Diabetic testing ordered  Hyperlipidemia - Discussed dietary modification, exercise, compliance  Diarrhea - Testing ordered  Renal calculi - Urology consult ordered    Visit Content:   Any referrals made are noted in AVS, and referrals have been reviewed with the patient.  Relevant diagnostic testing discussed  Reviewed appropriate outside records  Lab testing discussed  Any medication adjustment has been reviewed, and outlined in plan of care /  AVS    Health Maintenance   Updated as appropriate.    Record review completed           Diagnostic Test Results:  Results for orders placed or performed in visit on 01/03/20 (from the past 24 hour(s))   CBC with platelets differential   Result Value Ref Range    WBC 11.8 (H) 4.0 - 11.0 10e9/L    RBC Count 4.94 3.8 - 5.2 10e12/L    Hemoglobin 14.7 11.7 - 15.7 g/dL    Hematocrit 44.7 35.0 - 47.0 %    MCV 91 78 - 100 fl    MCH 29.8 26.5 - 33.0 pg    MCHC 32.9 31.5 - 36.5 g/dL    RDW 13.6 10.0 - 15.0 %    Platelet Count 201 150 - 450 10e9/L    % Neutrophils 54.7 %    % Lymphocytes 38.2 %    % Monocytes 4.9 %    % Eosinophils 1.9 %    % Basophils 0.3 %    Absolute Neutrophil 6.5 1.6 - 8.3 10e9/L    Absolute Lymphocytes 4.5 0.8 - 5.3 10e9/L    Absolute Monocytes 0.6 0.0 - 1.3 10e9/L    Absolute Eosinophils 0.2 0.0 - 0.7 10e9/L    Absolute Basophils 0.0 0.0 - 0.2 10e9/L    Diff Method Automated Method          Remainder of labs are pending      Assessment & Plan     1. Calculus of kidney  - UROLOGY ADULT REFERRAL    2. Elevated glucose  - Comprehensive metabolic panel (BMP + Alb, Alk Phos, ALT, AST, Total. Bili, TP)  - Hemoglobin A1c  - UA reflex to Microscopic and Culture - [facility]  - Albumin Random Urine Quantitative with Creat Ratio    3. Benign essential hypertension  - Comprehensive metabolic panel (BMP + Alb, Alk Phos, ALT, AST, Total. Bili,  TP)    4. Hyperlipidemia LDL goal <100  - Comprehensive metabolic panel (BMP + Alb, Alk Phos, ALT, AST, Total. Bili, TP)  - Lipid Profile    5. Taking a statin medication  - CMP ordered    6. Diarrhea, unspecified type  - CBC with platelets differential  - Stool culture SSCE; Future  - Clostridium difficile toxin B PCR; Future  - Adenovirus antigen, stool; Future    7. Tobacco abuse  - Tobacco Cessation - Order to Satisfy Health Maintenance    8. Tobacco abuse counseling  - Tobacco Cessation - Order to Satisfy Health Maintenance    9. Encounter for screening mammogram for breast cancer  - MA Screening Digital Bilateral; Future         To ER with any concerns over the weekend  Fluids  Monitor for temp        Tobacco Cessation:   reports that she has been smoking cigarettes. She started smoking about 48 years ago. She has a 40.00 pack-year smoking history. She has never used smokeless tobacco.  Tobacco Cessation Action Plan: Information offered: Patient not interested at this time  Self help information given to patient        See Patient Instructions      Ana Valdez, RENNY  [facility]

## 2020-01-04 ASSESSMENT — ANXIETY QUESTIONNAIRES: GAD7 TOTAL SCORE: 0

## 2020-01-05 DIAGNOSIS — R19.7 DIARRHEA, UNSPECIFIED TYPE: ICD-10-CM

## 2020-01-05 PROCEDURE — 87046 STOOL CULTR AEROBIC BACT EA: CPT | Mod: ZL | Performed by: NURSE PRACTITIONER

## 2020-01-05 PROCEDURE — 87449 NOS EACH ORGANISM AG IA: CPT | Mod: ZL | Performed by: NURSE PRACTITIONER

## 2020-01-05 PROCEDURE — 87493 C DIFF AMPLIFIED PROBE: CPT | Mod: ZL | Performed by: NURSE PRACTITIONER

## 2020-01-05 PROCEDURE — 87015 SPECIMEN INFECT AGNT CONCNTJ: CPT | Mod: ZL | Performed by: NURSE PRACTITIONER

## 2020-01-05 PROCEDURE — 87899 AGENT NOS ASSAY W/OPTIC: CPT | Mod: ZL | Performed by: NURSE PRACTITIONER

## 2020-01-05 PROCEDURE — 87045 FECES CULTURE AEROBIC BACT: CPT | Mod: ZL | Performed by: NURSE PRACTITIONER

## 2020-01-05 NOTE — RESULT ENCOUNTER NOTE
Lab indicates the presence of diabetes  Please schedule an appointment with me within the next week or so to discuss a treatment plan  Lipids elevated also, will  discuss at appt.    The 10-year ASCVD risk score (Sonya NORWOOD Jr., et al., 2013) is: 16.4%    Values used to calculate the score:      Age: 69 years      Sex: Female      Is Non- : No      Diabetic: No      Tobacco smoker: Yes      Systolic Blood Pressure: 120 mmHg      Is BP treated: Yes      HDL Cholesterol: 40 mg/dL      Total Cholesterol: 200 mg/dL

## 2020-01-06 LAB
C DIFF TOX B STL QL: NEGATIVE
CREAT UR-MCNC: 33 MG/DL
MICROALBUMIN UR-MCNC: 6 MG/L
MICROALBUMIN/CREAT UR: 19.12 MG/G CR (ref 0–25)
SPECIMEN SOURCE: NORMAL

## 2020-01-08 LAB
BACTERIA SPEC CULT: NORMAL
E COLI SXT1+2 STL IA: NORMAL
HADV AG STL QL IA: NEGATIVE
SPECIMEN SOURCE: NORMAL

## 2020-01-13 NOTE — PROGRESS NOTES
Aga Gary is a 69 year old female who presents to clinic today for the following health issues:      Diabetes New Onset    How often are you checking your blood sugar? Not at all    What concerns do you have today about your diabetes?Other: don't like the thought of poking her finger for checking sugars     Do you have any of these symptoms? (Select all that apply)  Numbness in feet, Burning in feet and Excessive thirst     Have you had a diabetic eye exam in the last 12 months? No        BP Readings from Last 2 Encounters:   01/14/20 102/74   01/03/20 120/68     Hemoglobin A1C (%)   Date Value   01/03/2020 7.2 (H)     LDL Cholesterol Calculated (mg/dL)   Date Value   01/03/2020 119 (H)   07/24/2019 99       Diabetes Management Resources         How many servings of fruits and vegetables do you eat daily?  0-1    On average, how many sweetened beverages do you drink each day (Examples: soda, juice, sweet tea, etc.  Do NOT count diet or artificially sweetened beverages)?   4    How many days per week do you miss taking your medication? 0        Left Axilla - erythematous rash - painful, appearance is consistent with candidiasis          Patient Active Problem List   Diagnosis     Benign essential hypertension     Tobacco abuse     Hyperlipidemia LDL goal <100     Taking a statin medication     Morbid obesity (H)     Calculus of kidney     Past Surgical History:   Procedure Laterality Date     ABDOMEN SURGERY      2 c-sections     CHOLECYSTECTOMY  1994     GYN SURGERY      total hyst,, no cervix     HC REMOVAL OF NAIL PLATE SIMPLE SINGLE  04/19/2018    removal of 1/4  left gt toenail, local      HEAD & NECK SURGERY      tumor neck, benign     ORTHOPEDIC SURGERY Bilateral     carpul tunnel        Social History     Tobacco Use     Smoking status: Heavy Tobacco Smoker     Packs/day: 1.00     Years: 40.00     Pack years: 40.00     Types: Cigarettes     Start date: 1/1/1972     Smokeless tobacco: Never Used      Tobacco comment: pt is slowly cutting down- 1 pack a day    Substance Use Topics     Alcohol use: No     Family History   Problem Relation Age of Onset     Other Cancer Mother      Other Cancer Brother      Breast Cancer Paternal Aunt              Current Outpatient Medications   Medication Sig Dispense Refill     amLODIPine (NORVASC) 10 MG tablet TAKE 1 TABLET (10 MG) BY MOUTH DAILY 30 tablet 6     aspirin 81 MG EC tablet Take 1 tablet (81 mg) by mouth daily 90 tablet 3     cholecalciferol (VITAMIN D3) 5000 units TABS tablet Take 1 tablet (5,000 Units) by mouth daily 90 tablet 11     HYDROcodone-acetaminophen (NORCO) 5-325 MG tablet Take 1 tablet by mouth every 6 hours as needed for severe pain 12 tablet 0     losartan (COZAAR) 100 MG tablet Take 100 mg by mouth daily       metoprolol tartrate (LOPRESSOR) 50 MG tablet TAKE 1 TABLET 2 TIMES DAILY FOR HEART AND BLOOD PRESSURE 60 tablet 3     Multiple Vitamins-Iron (DAILY MULTIPLE VITAMIN/IRON) TABS Take 1 tablet by mouth daily 90 tablet 11     omega 3 1000 MG CAPS 3 po daily (Patient taking differently: 1 capsule daily) 90 capsule 11     simvastatin (ZOCOR) 20 MG tablet TAKE 1 TABLET (20 MG) BY MOUTH AT BEDTIME 30 tablet 0       No Known Allergies       Recent Labs   Lab Test 01/03/20  1454 12/16/19  1628 07/24/19  1323  05/16/18  1347 04/13/18  1201   A1C 7.2*  --   --   --   --   --    *  --  99  --  101* 156*   HDL 40*  --  38*  --  39* 38*   TRIG 204*  --  158*  --  141 174*   ALT 50 45 58*   < > 52* 65*   CR 0.86 1.02 0.88   < > 0.70 0.81   GFRESTIMATED 69 56* 67   < > 83 71   GFRESTBLACK 80 65 78   < > >90 86   POTASSIUM 4.0 3.5 4.1   < > 4.1 4.1   TSH  --   --  2.78  --   --  5.58*    < > = values in this interval not displayed.      BP Readings from Last 3 Encounters:   01/14/20 102/74   01/03/20 120/68   12/16/19 148/90    Wt Readings from Last 3 Encounters:   01/14/20 89.4 kg (197 lb)   01/03/20 90 kg (198 lb 8 oz)   04/16/19 94.8 kg (209 lb)                Reviewed and updated as needed this visit by Provider         Review of Systems   ROS COMP: Constitutional, HEENT, cardiovascular, pulmonary, gi and gu systems are negative, except as otherwise noted.          Objective    /74 (BP Location: Left arm, Patient Position: Sitting, Cuff Size: Adult Regular)   Pulse 60   Temp 97.6  F (36.4  C) (Tympanic)   Wt 89.4 kg (197 lb)   SpO2 94%   BMI 36.62 kg/m    Body mass index is 36.62 kg/m .         Physical Exam   GENERAL: healthy, alert and no distress  EYES: Eyes grossly normal to inspection, PERRL and conjunctivae and sclerae normal  HENT: ear canals and TM's normal, nose and mouth without ulcers or lesions  RESP: lungs clear to auscultation - no rales, rhonchi or wheezes  CV: regular rate and rhythm, normal S1 S2, no S3 or S4, no murmur, click or rub, no peripheral edema and peripheral pulses strong  SKIN: left axilla rash - consistent with candidiasis  PSYCH: mentation appears normal, affect normal/bright          The 10-year ASCVD risk score (Sonya CUCO Jr., et al., 2013) is: 22.2%    Values used to calculate the score:      Age: 69 years      Sex: Female      Is Non- : No      Diabetic: Yes      Tobacco smoker: Yes      Systolic Blood Pressure: 102 mmHg      Is BP treated: Yes      HDL Cholesterol: 40 mg/dL      Total Cholesterol: 200 mg/dL            Assessment & Plan     1. Candidiasis of skin  - nystatin (MYCOSTATIN) 398368 UNIT/GM external cream; Apply topically 2 times daily  Dispense: 60 g; Refill: 1    2. Type 2 diabetes mellitus without complication, without long-term current use of insulin (H)  - metFORMIN (GLUCOPHAGE-XR) 500 MG 24 hr tablet; Take 1 tablet (500 mg) by mouth 2 times daily (with meals)  Dispense: 60 tablet; Refill: 3    3. Hyperlipidemia LDL goal <100  - simvastatin (ZOCOR) 40 MG tablet; 1 po qd  Dispense: 90 tablet; Refill: 1    4. Calculus of kidney  - UROLOGY ADULT REFERRAL    5. Benign essential  hypertension  - metoprolol tartrate (LOPRESSOR) 50 MG tablet; TAKE 1 TABLET 2 TIMES DAILY FOR HEART AND BLOOD PRESSURE  Dispense: 180 tablet; Refill: 1  - amLODIPine (NORVASC) 10 MG tablet; Take 1 tablet (10 mg) by mouth daily  Dispense: 90 tablet; Refill: 1  - losartan (COZAAR) 100 MG tablet; Take 1 tablet (100 mg) by mouth daily  Dispense: 90 tablet; Refill: 1         Work on weight loss  Regular exercise  See Patient Instructions    Follow-up 3 months      Ana Valdez CNP  [facility] [place] [facility]

## 2020-01-14 ENCOUNTER — OFFICE VISIT (OUTPATIENT)
Dept: FAMILY MEDICINE | Facility: OTHER | Age: 70
End: 2020-01-14
Attending: NURSE PRACTITIONER
Payer: COMMERCIAL

## 2020-01-14 VITALS
DIASTOLIC BLOOD PRESSURE: 74 MMHG | SYSTOLIC BLOOD PRESSURE: 102 MMHG | TEMPERATURE: 97.6 F | WEIGHT: 197 LBS | OXYGEN SATURATION: 94 % | HEART RATE: 60 BPM | BODY MASS INDEX: 36.62 KG/M2

## 2020-01-14 DIAGNOSIS — E11.9 TYPE 2 DIABETES MELLITUS WITHOUT COMPLICATION, WITHOUT LONG-TERM CURRENT USE OF INSULIN (H): ICD-10-CM

## 2020-01-14 DIAGNOSIS — I10 BENIGN ESSENTIAL HYPERTENSION: ICD-10-CM

## 2020-01-14 DIAGNOSIS — B37.2 CANDIDIASIS OF SKIN: Primary | ICD-10-CM

## 2020-01-14 DIAGNOSIS — N20.0 CALCULUS OF KIDNEY: ICD-10-CM

## 2020-01-14 DIAGNOSIS — E78.5 HYPERLIPIDEMIA LDL GOAL <100: ICD-10-CM

## 2020-01-14 PROCEDURE — G0463 HOSPITAL OUTPT CLINIC VISIT: HCPCS

## 2020-01-14 PROCEDURE — 99214 OFFICE O/P EST MOD 30 MIN: CPT | Performed by: NURSE PRACTITIONER

## 2020-01-14 RX ORDER — NYSTATIN 100000 U/G
CREAM TOPICAL 2 TIMES DAILY
Qty: 60 G | Refills: 1 | Status: SHIPPED | OUTPATIENT
Start: 2020-01-14

## 2020-01-14 RX ORDER — METFORMIN HCL 500 MG
500 TABLET, EXTENDED RELEASE 24 HR ORAL 2 TIMES DAILY WITH MEALS
Qty: 60 TABLET | Refills: 3 | Status: SHIPPED | OUTPATIENT
Start: 2020-01-14 | End: 2020-04-16

## 2020-01-14 RX ORDER — METOPROLOL TARTRATE 50 MG
TABLET ORAL
Qty: 180 TABLET | Refills: 1 | Status: SHIPPED | OUTPATIENT
Start: 2020-01-14 | End: 2020-04-27

## 2020-01-14 RX ORDER — AMLODIPINE BESYLATE 10 MG/1
10 TABLET ORAL DAILY
Qty: 90 TABLET | Refills: 1 | Status: SHIPPED | OUTPATIENT
Start: 2020-01-14 | End: 2020-04-21

## 2020-01-14 RX ORDER — SIMVASTATIN 40 MG
TABLET ORAL
Qty: 90 TABLET | Refills: 1 | Status: SHIPPED | OUTPATIENT
Start: 2020-01-14 | End: 2020-05-04

## 2020-01-14 RX ORDER — LOSARTAN POTASSIUM 100 MG/1
100 TABLET ORAL DAILY
Qty: 90 TABLET | Refills: 1 | Status: SHIPPED | OUTPATIENT
Start: 2020-01-14 | End: 2020-05-04

## 2020-01-14 ASSESSMENT — PAIN SCALES - GENERAL: PAINLEVEL: NO PAIN (0)

## 2020-01-14 NOTE — NURSING NOTE
"Chief Complaint   Patient presents with     Diabetes       Initial /74 (BP Location: Left arm, Patient Position: Sitting, Cuff Size: Adult Regular)   Pulse 60   Temp 97.6  F (36.4  C) (Tympanic)   Wt 89.4 kg (197 lb)   SpO2 94%   BMI 36.62 kg/m   Estimated body mass index is 36.62 kg/m  as calculated from the following:    Height as of 8/28/18: 1.562 m (5' 1.5\").    Weight as of this encounter: 89.4 kg (197 lb).  Medication Reconciliation: complete  Ashley A. Lechevalier, LPN  "

## 2020-01-14 NOTE — PATIENT INSTRUCTIONS
Assessment & Plan     1. Candidiasis of skin  - nystatin (MYCOSTATIN) 377276 UNIT/GM external cream; Apply topically 2 times daily  Dispense: 60 g; Refill: 1    2. Type 2 diabetes mellitus without complication, without long-term current use of insulin (H)  - metFORMIN (GLUCOPHAGE-XR) 500 MG 24 hr tablet; Take 1 tablet (500 mg) by mouth 2 times daily (with meals)  Dispense: 60 tablet; Refill: 3 - start with one daily for 1 week, then increase to twice daily    3. Hyperlipidemia LDL goal <100  - simvastatin (ZOCOR) 40 MG tablet; 1 po qd  Dispense: 90 tablet; Refill: 1    4. Calculus of kidney  - UROLOGY ADULT REFERRAL    5. Benign essential hypertension  - metoprolol tartrate (LOPRESSOR) 50 MG tablet; TAKE 1 TABLET 2 TIMES DAILY FOR HEART AND BLOOD PRESSURE  Dispense: 180 tablet; Refill: 1  - amLODIPine (NORVASC) 10 MG tablet; Take 1 tablet (10 mg) by mouth daily  Dispense: 90 tablet; Refill: 1  - losartan (COZAAR) 100 MG tablet; Take 1 tablet (100 mg) by mouth daily  Dispense: 90 tablet; Refill: 1         Work on weight loss  Regular exercise  See Patient Instructions    Follow-up 3 months      Ana Valdez CNP  [facility]

## 2020-02-19 ENCOUNTER — OFFICE VISIT (OUTPATIENT)
Dept: UROLOGY | Facility: OTHER | Age: 70
End: 2020-02-19
Attending: NURSE PRACTITIONER
Payer: COMMERCIAL

## 2020-02-19 VITALS
HEART RATE: 63 BPM | OXYGEN SATURATION: 92 % | DIASTOLIC BLOOD PRESSURE: 66 MMHG | TEMPERATURE: 97.8 F | SYSTOLIC BLOOD PRESSURE: 104 MMHG | WEIGHT: 197 LBS | BODY MASS INDEX: 36.62 KG/M2

## 2020-02-19 DIAGNOSIS — N20.0 CALCULUS OF KIDNEY: Primary | ICD-10-CM

## 2020-02-19 PROCEDURE — G0463 HOSPITAL OUTPT CLINIC VISIT: HCPCS | Performed by: COUNSELOR

## 2020-02-19 PROCEDURE — G0463 HOSPITAL OUTPT CLINIC VISIT: HCPCS | Mod: 25 | Performed by: COUNSELOR

## 2020-02-19 PROCEDURE — 99203 OFFICE O/P NEW LOW 30 MIN: CPT | Performed by: UROLOGY

## 2020-02-19 ASSESSMENT — PAIN SCALES - GENERAL: PAINLEVEL: NO PAIN (0)

## 2020-02-19 ASSESSMENT — ENCOUNTER SYMPTOMS
WHEEZING: 1
SHORTNESS OF BREATH: 1

## 2020-02-19 NOTE — LETTER
2/19/2020       RE: Aga Gary  [address]  [facility] [address]     Dear Colleague,    Thank you for referring your patient, Aga Gary, to the [facility]. Please see a copy of my visit note below.      History     Chief Complaint:    Consult (Renal Calculi, Ana Valdez referring)      HPI   Aga Gary is a 69 year old female who presents withA recent history of left renal colic.  Aga came to the emergency room on 12/16/2019 with severe left renal colic.  CT scan revealed a 5 mm proximal left ureteral stone.  The patient does not have any other stones appreciated on the CT scan.  She denied any fever and her urinalysis was negative for any infection.  She did have some microscopic blood in her urinalysis.  This is the first stone she is ever had but she does have family history of stones with a brother.  He has not had any further pain but she has not been straining her urine so she does not know if she is passed it yet.  She was also diagnosed with diabetes as noted on the urinalysis she was spilling a significant amount of sugar.  Her creatinine was 1.02 with a GFR of 56.    Allergies:    No Known Allergies     Medications:      amLODIPine (NORVASC) 10 MG tablet  aspirin 81 MG EC tablet  cholecalciferol (VITAMIN D3) 5000 units TABS tablet  losartan (COZAAR) 100 MG tablet  metFORMIN (GLUCOPHAGE-XR) 500 MG 24 hr tablet  metoprolol tartrate (LOPRESSOR) 50 MG tablet  Multiple Vitamins-Iron (DAILY MULTIPLE VITAMIN/IRON) TABS  nystatin (MYCOSTATIN) 031251 UNIT/GM external cream  omega 3 1000 MG CAPS  simvastatin (ZOCOR) 40 MG tablet        Problem List:      Patient Active Problem List    Diagnosis Date Noted     Type 2 diabetes mellitus without complication, without long-term current use of insulin (H) 01/14/2020     Priority: Medium     Calculus of kidney 01/03/2020     Priority: Medium     Morbid obesity (H)  06/25/2018     Priority: Medium     Benign essential hypertension 04/13/2018     Priority: Medium     Tobacco abuse 04/13/2018     Priority: Medium     Hyperlipidemia LDL goal <100 04/13/2018     Priority: Medium     Taking a statin medication 04/13/2018     Priority: Medium        Past Medical History:      Past Medical History:   Diagnosis Date     Benign essential hypertension 4/13/2018     Calculus of kidney 1/3/2020     Hyperlipidemia LDL goal <100 4/13/2018     Taking a statin medication 4/13/2018     Tobacco abuse 4/13/2018     Type 2 diabetes mellitus without complication, without long-term current use of insulin (H) 1/14/2020       Past Surgical History:      Past Surgical History:   Procedure Laterality Date     ABDOMEN SURGERY      2 c-sections     CHOLECYSTECTOMY  1994     GYN SURGERY      total hyst,, no cervix     HC REMOVAL OF NAIL PLATE SIMPLE SINGLE  04/19/2018    removal of 1/4  left gt toenail, local      HEAD & NECK SURGERY      tumor neck, benign     ORTHOPEDIC SURGERY Bilateral     carpul tunnel        Family History:      Family History   Problem Relation Age of Onset     Other Cancer Mother      Other Cancer Brother      Breast Cancer Paternal Aunt        Social History:    Marital Status:   [2]  Social History     Tobacco Use     Smoking status: Heavy Tobacco Smoker     Packs/day: 1.00     Years: 40.00     Pack years: 40.00     Types: Cigarettes     Start date: 1/1/1972     Smokeless tobacco: Never Used     Tobacco comment: pt is slowly cutting down- 1 pack a day    Substance Use Topics     Alcohol use: No     Drug use: No        Review of Systems   Respiratory: Positive for shortness of breath and wheezing.    All other systems reviewed and are negative.        Physical Exam   Vitals:  /66 (BP Location: Left arm, Patient Position: Sitting, Cuff Size: Adult Regular)   Pulse 63   Temp 97.8  F (36.6  C) (Tympanic)   Wt 89.4 kg (197 lb)   SpO2 92%   BMI 36.62 kg/m          Physical Exam  Constitutional:       Appearance: Normal appearance. She is normal weight.   Cardiovascular:      Rate and Rhythm: Normal rate.   Pulmonary:      Effort: Pulmonary effort is normal.      Breath sounds: Wheezing present.   Abdominal:      General: Abdomen is flat. Bowel sounds are normal.      Palpations: Abdomen is soft. There is no mass.      Tenderness: There is no abdominal tenderness. There is no right CVA tenderness, left CVA tenderness or guarding.      Hernia: No hernia is present.   Neurological:      Mental Status: She is alert.         Study Result     PROCEDURE:  CT ABDOMEN PELVIS W CONTRAST     HISTORY: Abd pain, diverticulitis suspected; Abd pain, acute,  generalized     TECHNIQUE:  Helical CT of the abdomen and pelvis was performed  following injection of intravenous contrast.     COMPARISON:  None.     MEDS/CONTRAST: Isovue 300 100mL     FINDINGS:       Limited images through the lung bases demonstrate dependent  atelectasis. A small sliding hiatal hernia is questioned.     There is mild left hydronephrosis secondary to a 5 mm stone at the  left ureteropelvic junction. There is left perinephric fluid. No  nonobstructive calculi are identified.     The liver is mildly heterogeneous without a discrete mass.  The  gallbladder is surgically absent. The spleen is borderline enlarged.  The pancreas and adrenal glands are unremarkable. The bowel is normal  in caliber. The appendix is normal.     No free air or adenopathy is present.  No suspicious osseous lesions  are identified.                                                                      IMPRESSION:       Mild left hydronephrosis secondary to a 5 mm stone at the left  ureteropelvic junction.     DRE MIRANDA MD     Exam Date Exam Time Accession # Results    12/16/19  4:15 PM [id number]    Specimen Information: Midstream Urine        Component Value Flag Ref Range Units Status Collected Lab   Color Urine Light  Yellow     Final 12/16/2019  4:15 PM [facility]   Appearance Urine Clear     Final 12/16/2019  4:15 PM M [facility]   Glucose Urine >1000  Abnormal   NEG^Negative mg/dL Final 12/16/2019  4:15 PM M [facility]   Bilirubin Urine Negative   NEG^Negative  Final 12/16/2019  4:15 PM M [facility]   Ketones Urine 10  Abnormal   NEG^Negative mg/dL Final 12/16/2019  4:15 PM M [facility]   Specific Gravity Urine 1.019   1.003 - 1.035  Final 12/16/2019  4:15 PM M [facility]   Blood Urine Trace  Abnormal   NEG^Negative  Final 12/16/2019  4:15 PM [facility]   pH Urine 6.0   4.7 - 8.0 pH Final 12/16/2019  4:15 PM [facility]   Protein Albumin Urine 10  Abnormal   NEG^Negative mg/dL Final 12/16/2019  4:15 PM [facility]   Urobilinogen mg/dL Normal   0.0 - 2.0 mg/dL Final 12/16/2019  4:15 PM [facility]   Nitrite Urine Negative   NEG^Negative  Final 12/16/2019  4:15 PM [facility]   Leukocyte Esterase Urine Negative   NEG^Negative  Final 12/16/2019  4:15 PM M [facility]   Source     Final 12/16/2019  4:15 PM [facility]   Midstream Urine    RBC Urine 5  High   0 - 2 /HPF Final 12/16/2019  4:15 PM M [facility]   WBC Urine 1   0 - 5 /HPF Final 12/16/2019  4:15 PM [facility]   Bacteria Urine None  Abnormal   NEG^Negative /HPF Final 12/16/2019  4:15 PM M [facility]   Squamous Epithelial /HPF Urine 1   0 - 1 /HPF        Impression: Left proximal ureteral stone  Plan   Plan: The patient has not been having any pain but does not know whether she has passed  the stone or not.  Recommendation is to repeat a CT stone protocol to verify that the stone is gone.  We did talk a bit about a metabolic evaluation but this is her first stone and she does not have any other stones so at this point I recommended just increasing her fluids and increasing her urine citrate with lemonade or lemon that she can put in her water.  I will get back to her with the CT scan report and we will go from there.      No follow-ups on file.    Allie Parrish MD  [facility] [place]            Again, thank you for allowing me to participate in the care of your patient.      Sincerely,    Allie Parrish MD

## 2020-02-19 NOTE — NURSING NOTE
"Chief Complaint   Patient presents with     Consult     Renal Calculi, Ana Valdez referring       Initial /66 (BP Location: Left arm, Patient Position: Sitting, Cuff Size: Adult Regular)   Pulse 63   Temp 97.8  F (36.6  C) (Tympanic)   Wt 89.4 kg (197 lb)   SpO2 92%   BMI 36.62 kg/m   Estimated body mass index is 36.62 kg/m  as calculated from the following:    Height as of 8/28/18: 1.562 m (5' 1.5\").    Weight as of this encounter: 89.4 kg (197 lb).  Medication Reconciliation: complete     Review of Systems:    Weight loss:    No     Recent fever/chills:  No   Night sweats:   No  Current skin rash:  No   Recent hair loss:  No  Heat intolerance:  No   Cold intolerance:  No  Chest pain:   No   Palpitations:   No  Shortness of breath:  YES   Wheezing:   YES  Constipation:    No   Diarrhea:   No   Nausea:   No   Vomiting:   No   Kidney/side pain:  No   Back pain:   No  Frequent headaches:  No   Dizziness:     No  Leg swelling:   No   Calf pain:    No    Parents, brothers or sisters with history of kidney cancer?   No  Parents, brothers or sisters with history of bladder cancer: No    Norma Sewell LPN    "

## 2020-02-19 NOTE — PROGRESS NOTES
History     Chief Complaint:    Consult (Renal Calculi, Ana Valdez referring)      HPI   Aga Gary is a 69 year old female who presents withA recent history of left renal colic.  Aga came to the emergency room on 12/16/2019 with severe left renal colic.  CT scan revealed a 5 mm proximal left ureteral stone.  The patient does not have any other stones appreciated on the CT scan.  She denied any fever and her urinalysis was negative for any infection.  She did have some microscopic blood in her urinalysis.  This is the first stone she is ever had but she does have family history of stones with a brother.  He has not had any further pain but she has not been straining her urine so she does not know if she is passed it yet.  She was also diagnosed with diabetes as noted on the urinalysis she was spilling a significant amount of sugar.  Her creatinine was 1.02 with a GFR of 56.    Allergies:    No Known Allergies     Medications:      amLODIPine (NORVASC) 10 MG tablet  aspirin 81 MG EC tablet  cholecalciferol (VITAMIN D3) 5000 units TABS tablet  losartan (COZAAR) 100 MG tablet  metFORMIN (GLUCOPHAGE-XR) 500 MG 24 hr tablet  metoprolol tartrate (LOPRESSOR) 50 MG tablet  Multiple Vitamins-Iron (DAILY MULTIPLE VITAMIN/IRON) TABS  nystatin (MYCOSTATIN) 403195 UNIT/GM external cream  omega 3 1000 MG CAPS  simvastatin (ZOCOR) 40 MG tablet        Problem List:      Patient Active Problem List    Diagnosis Date Noted     Type 2 diabetes mellitus without complication, without long-term current use of insulin (H) 01/14/2020     Priority: Medium     Calculus of kidney 01/03/2020     Priority: Medium     Morbid obesity (H) 06/25/2018     Priority: Medium     Benign essential hypertension 04/13/2018     Priority: Medium     Tobacco abuse 04/13/2018     Priority: Medium     Hyperlipidemia LDL goal <100 04/13/2018     Priority: Medium     Taking a statin medication 04/13/2018     Priority: Medium        Past Medical  History:      Past Medical History:   Diagnosis Date     Benign essential hypertension 4/13/2018     Calculus of kidney 1/3/2020     Hyperlipidemia LDL goal <100 4/13/2018     Taking a statin medication 4/13/2018     Tobacco abuse 4/13/2018     Type 2 diabetes mellitus without complication, without long-term current use of insulin (H) 1/14/2020       Past Surgical History:      Past Surgical History:   Procedure Laterality Date     ABDOMEN SURGERY      2 c-sections     CHOLECYSTECTOMY  1994     GYN SURGERY      total hyst,, no cervix     HC REMOVAL OF NAIL PLATE SIMPLE SINGLE  04/19/2018    removal of 1/4  left gt toenail, local      HEAD & NECK SURGERY      tumor neck, benign     ORTHOPEDIC SURGERY Bilateral     carpul tunnel        Family History:      Family History   Problem Relation Age of Onset     Other Cancer Mother      Other Cancer Brother      Breast Cancer Paternal Aunt        Social History:    Marital Status:   [2]  Social History     Tobacco Use     Smoking status: Heavy Tobacco Smoker     Packs/day: 1.00     Years: 40.00     Pack years: 40.00     Types: Cigarettes     Start date: 1/1/1972     Smokeless tobacco: Never Used     Tobacco comment: pt is slowly cutting down- 1 pack a day    Substance Use Topics     Alcohol use: No     Drug use: No        Review of Systems   Respiratory: Positive for shortness of breath and wheezing.    All other systems reviewed and are negative.        Physical Exam   Vitals:  /66 (BP Location: Left arm, Patient Position: Sitting, Cuff Size: Adult Regular)   Pulse 63   Temp 97.8  F (36.6  C) (Tympanic)   Wt 89.4 kg (197 lb)   SpO2 92%   BMI 36.62 kg/m        Physical Exam  Constitutional:       Appearance: Normal appearance. She is normal weight.   Cardiovascular:      Rate and Rhythm: Normal rate.   Pulmonary:      Effort: Pulmonary effort is normal.      Breath sounds: Wheezing present.   Abdominal:      General: Abdomen is flat. Bowel sounds are  normal.      Palpations: Abdomen is soft. There is no mass.      Tenderness: There is no abdominal tenderness. There is no right CVA tenderness, left CVA tenderness or guarding.      Hernia: No hernia is present.   Neurological:      Mental Status: She is alert.         Study Result     PROCEDURE:  CT ABDOMEN PELVIS W CONTRAST     HISTORY: Abd pain, diverticulitis suspected; Abd pain, acute,  generalized     TECHNIQUE:  Helical CT of the abdomen and pelvis was performed  following injection of intravenous contrast.     COMPARISON:  None.     MEDS/CONTRAST: Isovue 300 100mL     FINDINGS:       Limited images through the lung bases demonstrate dependent  atelectasis. A small sliding hiatal hernia is questioned.     There is mild left hydronephrosis secondary to a 5 mm stone at the  left ureteropelvic junction. There is left perinephric fluid. No  nonobstructive calculi are identified.     The liver is mildly heterogeneous without a discrete mass.  The  gallbladder is surgically absent. The spleen is borderline enlarged.  The pancreas and adrenal glands are unremarkable. The bowel is normal  in caliber. The appendix is normal.     No free air or adenopathy is present.  No suspicious osseous lesions  are identified.                                                                      IMPRESSION:       Mild left hydronephrosis secondary to a 5 mm stone at the left  ureteropelvic junction.     DRE MIRANDA MD     Exam Date Exam Time Accession # Results    12/16/19  4:15 PM [id number]    Specimen Information: Midstream Urine        Component Value Flag Ref Range Units Status Collected Lab   Color Urine Light Yellow     Final 12/16/2019  4:15 PM M [facility]   Appearance Urine Clear     Final 12/16/2019  4:15 PM M [facility]   Glucose Urine >1000  Abnormal   NEG^Negative mg/dL Final 12/16/2019  4:15 PM M [facility]    Bilirubin Urine Negative   NEG^Negative  Final 12/16/2019  4:15 PM [facility]   Ketones Urine 10  Abnormal   NEG^Negative mg/dL Final 12/16/2019  4:15 PM [facility]   Specific Gravity Urine 1.019   1.003 - 1.035  Final 12/16/2019  4:15 PM [facility]   Blood Urine Trace  Abnormal   NEG^Negative  Final 12/16/2019  4:15 PM [facility]   pH Urine 6.0   4.7 - 8.0 pH Final 12/16/2019  4:15 PM [facility]   Protein Albumin Urine 10  Abnormal   NEG^Negative mg/dL Final 12/16/2019  4:15 PM [facility]   Urobilinogen mg/dL Normal   0.0 - 2.0 mg/dL Final 12/16/2019  4:15 PM [facility]   Nitrite Urine Negative   NEG^Negative  Final 12/16/2019  4:15 PM [facility]   Leukocyte Esterase Urine Negative   NEG^Negative  Final 12/16/2019  4:15 PM [facility]   Source     Final 12/16/2019  4:15 PM [facility]   Midstream Urine    RBC Urine 5  High   0 - 2 /HPF Final 12/16/2019  4:15 PM [facility]   WBC Urine 1   0 - 5 /HPF Final 12/16/2019  4:15 PM [facility]   Bacteria Urine None  Abnormal   NEG^Negative /HPF Final 12/16/2019  4:15 PM [facility]   Squamous Epithelial /HPF Urine 1   0 - 1 /HPF        Impression: Left proximal ureteral stone  Plan   Plan: The patient has not been having any pain but does not know whether she has passed the stone or not.  Recommendation is to repeat a CT stone protocol to verify that the stone is gone.  We did talk a bit about a metabolic evaluation but this is her first stone and she does not have any other stones so at this point I recommended just increasing her fluids and increasing her urine  citrate with lemonade or lemon that she can put in her water.  I will get back to her with the CT scan report and we will go from there.      No follow-ups on file.    Allie Parrish MD  [facility]

## 2020-02-27 ENCOUNTER — HOSPITAL ENCOUNTER (OUTPATIENT)
Dept: CT IMAGING | Facility: HOSPITAL | Age: 70
Discharge: HOME | End: 2020-02-27
Attending: UROLOGY | Admitting: UROLOGY
Payer: COMMERCIAL

## 2020-02-27 DIAGNOSIS — N20.0 CALCULUS OF KIDNEY: ICD-10-CM

## 2020-02-27 PROCEDURE — 74176 CT ABD & PELVIS W/O CONTRAST: CPT | Mod: TC

## 2020-04-16 DIAGNOSIS — E11.9 TYPE 2 DIABETES MELLITUS WITHOUT COMPLICATION, WITHOUT LONG-TERM CURRENT USE OF INSULIN (H): ICD-10-CM

## 2020-04-16 RX ORDER — METFORMIN HCL 500 MG
500 TABLET, EXTENDED RELEASE 24 HR ORAL 2 TIMES DAILY WITH MEALS
Qty: 60 TABLET | Refills: 3 | Status: SHIPPED | OUTPATIENT
Start: 2020-04-16 | End: 2020-08-20

## 2020-04-27 DIAGNOSIS — I10 BENIGN ESSENTIAL HYPERTENSION: ICD-10-CM

## 2020-04-27 RX ORDER — METOPROLOL TARTRATE 50 MG
TABLET ORAL
Qty: 180 TABLET | Refills: 1 | Status: SHIPPED | OUTPATIENT
Start: 2020-04-27 | End: 2020-10-28

## 2020-05-04 DIAGNOSIS — I10 BENIGN ESSENTIAL HYPERTENSION: ICD-10-CM

## 2020-05-04 DIAGNOSIS — E78.5 HYPERLIPIDEMIA LDL GOAL <100: ICD-10-CM

## 2020-05-04 RX ORDER — LOSARTAN POTASSIUM 100 MG/1
100 TABLET ORAL DAILY
Qty: 90 TABLET | Refills: 1 | Status: SHIPPED | OUTPATIENT
Start: 2020-05-04 | End: 2020-11-02

## 2020-05-04 RX ORDER — SIMVASTATIN 40 MG
TABLET ORAL
Qty: 90 TABLET | Refills: 1 | Status: SHIPPED | OUTPATIENT
Start: 2020-05-04 | End: 2020-11-10

## 2020-05-13 NOTE — MR AVS SNAPSHOT
After Visit Summary   5/3/2018    Aga Gary    MRN: [id number]           Patient Information     Date Of Birth          1950        Visit Information        Provider Department      5/3/2018 11:30 AM Finn Solorio MD [facility]        Today's Diagnoses     History of tobacco use    -  1    Encounter for screening colonoscopy          Care Instructions    You will be contacted by [facility] radiology to schedule your CT scan. appointment. Please call the nurse at [phone] if you are not contacted in a timely manner.       Guide to your Colonoscopy with GOLYTELY preparation      Date of Procedure 5/30/18 with Dr. Solorio    Admit time: [facility] will call you about 1 day before your procedure by 5pm with your admit time.         YOUR UPCOMING COLONOSCOPY    At [facility], we want to make sure that your colonoscopy is as pleasant as possible. This guide is designed to answer any questions you might have and to walk you through the preparations you will need to make before your procedure.    Should you have additional questions, please feel free to contact us. Contact numbers are listed below. Thank you for choosing [facility].    Important Numbers    Clinic Health Unit Coordinator: [phone]  Clinic Nurse: [phone] (or [phone]; [phone])     Your procedure will be at the [facility] in [place]  [address] [facility] [address]  [place] () [phone]  Fax Number [phone]      All nursing questions or concerns can be directed to the clinic nurse    If you have a scheduling or appointment question, or need to postoone your procedure, please call the Health Unit Coordinator between 8am and 4pm Monday through Friday.      SPECIAL INSTRUCTIONS: (Diabetes, blood thinners)       hold aspirin for 7 days prior to procedure          COLONOSCOPY PREP    7 DAYS BEFORE THE EXAM:     Do  not take Aspirin or other NSAIDS (Ibuprofen, Motrin, Aleve, Celebrex, Naproxen, etc) 7 days before your surgery. Tylenol is fine.    If you are prescribed blood thinners (Aspirin, Coumadin/Warfarin, Plavix, etc) talk to your provider.    Stop taking fiber supplements, vitamins, iron or multivitamins that contain iron.    If you are a diabetic and take medications to control your blood sugar, follow the special instructions listed or talk to your provider.     Arrange transportation with a family member or friend to drive you home and have an adult available to stay with you for the next 4 hours when you arrive home for your safety. If you need to take a taxi or the bus, you MUST have a responsible adult to ride with you OR YOUR PROCEDURE WILL BE CANCELLED. It is recommended that you DO NOT DRIVE for the next 24 hours after receiving anesthesia.      prescriptions at your pharmacy. If it has been more than one week since your appointment was scheduled, please call your pharmacy to verify it is still ready for .     Call the office if you should become ill within 1 week of your procedure and we will reschedule it when you are healthy. This includes sings or symptoms of a cold or the flu. This can include fever, chills, sore throat, cough, chest congestions, productive cough, runny nose.     1 DAY BEFORE THE EXAM:    DO NOT EAT ANY SOLID FOOD OR MILK PRODUCTS AFTER 12:00 AM (MIDNIGHT).  Drink only clear liquids for breakfast, lunch and dinner. (No red or purple colors as these colors can be mistaken for blood.)  Clear liquids include water, juices without pulp, soft drinks, broth, bouillon, black coffee without cream, tea, Sandro-Aid, Gatorade, Jell-O and popsicles. No red or purple colors.  Follow the instructions of your colon preparation.         CLEAR LIQUID DIET    You may have:  Tea, coffee (no cream)  Water, vitamin water, smart water, coconut water, PowerAde, Propel, Soda that is not red or purple  (Sprite, 7-up, ginger ale), Gatorade that is not red or purple  Clear nutrition drinks (Resource Breeze, Ensure Active protein drink peach flavor)  Jell-O (not red or purple), Popsicles (without milk or fruit pieces and not red or purple), Moroccan ice (not red or purple)  Fat-free broth or bouillon  Plain hard candy such as life savers (not red or purple)  Powdered lemonade such as Crystal Light or Country Time  Clear juices and fruit-flavored drinks such as apple juice, white grape juice, Hi-C and Sandro-Aid (not red or purple)  Honey, sugar    Do not have:  Milk or milk products such as ice cream, malts, or shakes  Red or purple drinks of any kind  Cranberry or grape juice  Red or purple Jell-O, popsicles, Sandro-Aid, Sorber and candy  Orange, grapefruit, pineapple or tomato juices or juices with pulp  Cream soups of any kind  Alcohol    BETWEEN 4:00PM and 6:00PM PRIOR TO EXAM:    Drink one 8 ounce glass every 10 minutes until gone. Drink each glass quickly rather than sipping.  If the liquid is too salty, you may use a straw. Drink the whole gallon of Golytely. You must complete the entire gallon as directed.  You will be passing light yellow or clear watery stool.       DAY OF COLONOSCOPY PROCEDURE:     You many have clear liquids up until 3 hours before you check in at admitting.  Wear comfortable clothes. No jewelry, body piercings, make-up, nail polish, hair spray, lotions, perfumes or colognes. Shower before you arrive.  Take blood pressure and heart medications as usual with a sip of water.  [place] in Admitting through the [place] En[facility].  You must have a  with you and and adult available to stay with your for 4 hours at home. The medicine used in this test will make you sleepy. If you do not have someone, please reschedule or your test will be cancelled.  It is recommended that you do not drive for 24 hours after your test. Do not operate power equipment, drink alcoholic beverages, make important  "decisions or sign legal documents.     COLONOSCOPY FREQUENTLY ASKED QUESTIONS    What is a colonoscopy?    A colonoscopy is a test to look at the lining of your large intestine. The purpose of the exam is to check for abnormalities including growths called \"polyps\" that can lead to serious disease. A flexibles scope is inserted into your rectum by the doctor to examine your large intestine.    What are polyps?  Polyps are abnormal growths on the lining of the colon. Most polyps are not cancerous, but some polyps have the potential to turn into cancer with time. Polyps can also bleed. For these reasons, most polyps are removed during a colonoscopy and sent to the laboratory for microscopic examination.    What preparation is needed?  The colon must be completely clean for the procedure to be performed. You may be given one or two different prep solutions to cleanse your bowel. You will also need to follow a clear liquid diet the day before your procedure.    What happens after the procedure?  After your procedure is complete, you will be taken back to your day surgery room where you will be monitored for approximately 1 hour. You can expect to feel drowsy for several hours afterward. You may experience some cramping or bloating due to the air introduced into your colon during the exam. You will not be able to drive or operate machinery the rest of the day. You will be given written discharge instructions and appropriate learning material before you go home. You must have an adult to stay at home with you for the next 4 hours after you leave the hospital for your safety.    When will I find out the results of my test?  Your surgeon will talk to you and your designated  before you leave and usually the preliminary results can be given to you at that time. If a biopsy was taken during your procedure, it will be sent to the laboratory for examination. Results usually take one week. You will be contacted by phone or " by letter with results.      TIPS FOR COLON CLEANSING BEFORE YOUR COLONOSCOPY    To get accurate results from your exam, your colon must be completely clean and empty. Please follow your doctor's instructions. If you do not, you may need to repeat both the exam and colon-cleansing process.    The medicine you take may cause bloating, nausea and other discomfort. Follow these tips to make the process as easy as possible:     You may use alcohol-free baby wipes to ease anal irritation. You may also use Vaseline to help protect the skin. Other options include Tucks wipes, hemorrhoid treatments and hydrocortisone cream.     You may wish to squeeze some lemon juice into your preparation or add a packet of Crystal Light lemon-lime or ice tea flavor. (remember to not use red or purple)    To chill the solution, put it in your refrigerator or set it in a bowl of ice. Do not add ice in your drinking glass. You may remove the colon preparation from the refrigerator 15-30 minutes before drinking.    Quickly drink one whole glass every 5 to 10 minutes. It may help to use a timer. If the liquid is too salty, use a straw.    Stay near a toilet!    You will have diarrhea (loose watery stools) and may also have chills. Dress for comfort.    Expect to feel discomfort until the stool clears from your colon. This usually takes about 2 to 4 hours.    Even when you are sitting on the toilet, keep drinking a glass of solution every 10-15 minutes.    If you have nausea or vomiting, rinse your mouth with water. Take a break for 15 to 30 minutes, and then keep drinking the solution.    Some people find it helpful to suck on a wedge of lemon or lime. You may also try sucking on hard candy (not red or purple) or washing your mouth out with water, clear soda or mouthwash.    If you followed your doctor's orders and your stool is a clear or yellow liquid, you are ready for the exam.    If you are not sure if your colon is clean, please call  your clinic and ask to speak to a nurse.       Lung Cancer Screening   Frequently Asked Questions  If you are at high-risk for lung cancer, getting screened with low-dose computed tomography (LDCT) every year can help save your life. This handout offers answers to some of the most common questions about lung cancer screening. If you have other questions, please call [phone] ([phone]).     What is it?  Lung cancer screening uses special X-ray technology to create an image of your lung tissue. The exam is quick and easy and takes less than 10 seconds. We don t give you any medicine or use any needles. You can eat before and after the exam. You don t need to change your clothes as long as the clothing on your chest doesn t contain metal. But, you do need to be able to hold your breath for at least 6 seconds during the exam.    What is the goal of lung cancer screening?  The goal of lung cancer screening is to save lives. Many times, lung cancer is not found until a person starts having physical symptoms. Lung cancer screening can help detect lung cancer in the earliest stages when it may be easier to treat.    Who should be screened for lung cancer?  We suggest lung cancer screening for anyone who is at high-risk for lung cancer. You are in the high-risk group if you:      are between the ages of 55 and 79, and    have smoked at least 1 pack of cigarettes a day for 30 or more years, and    still smoke or have quit within the past 15 years.    However, if you have a new cough or shortness of breath, you should talk to your doctor before being screened.    Some national lung health advocacy groups also recommend screening for people ages 50 to 79 who have smoked an average of 1 pack of cigarettes a day for 20 years. They must also have at least 1 other risk factor for lung cancer, not including exposure to secondhand smoke. Other risk factors are having had cancer in the past, emphysema, pulmonary  fibrosis, COPD, a family history of lung cancer, or exposure to certain materials such as arsenic, asbestos, beryllium, cadmium, chromium, diesel fumes, nickel, radon or silica. Your care team can help you know if you have one of these risk factors.     Why does it matter if I have symptoms?  Certain symptoms can be a sign that you have a condition in your lungs that should be checked and treated by your doctor. These symptoms include fever, chest pain, a new or changing cough, shortness of breath that you have never felt before, coughing up blood or unexplained weight loss. Having any of these symptoms can greatly affect the results of lung cancer screening.       Should all smokers get an LDCT lung cancer screening exam?  It depends. Lung cancer screening is for a very specific group of men and women who have a history of heavy smoking over a long period of time (see  Who should be screened for lung cancer  above).  I am in the high-risk group, but have been diagnosed with cancer in the past. Is LDCT lung cancer screening right for me?  In some cases, you should not have LDCT lung screening, such as when your doctor is already following your cancer with CT scan studies. Your doctor will help you decide if LDCT lung screening is right for you.  Do I need to have a screening exam every year?  Yes. If you are in the high-risk group described earlier, you should get an LDCT lung cancer screening exam every year until you are 79, or are no longer willing or able to undergo screening and possible procedures to diagnose and treat lung cancer.  How effective is LDCT at preventing death from lung cancer?  Studies have shown that LDCT lung cancer screening can lower the risk of death from lung cancer by 20 percent in people who are at high-risk.  What are the risks?  There are some risks and limitations of LDCT lung cancer screening. We want to make sure you understand the risks and benefits, so please let us know if you  have any questions. Your doctor may want to talk with you more about these risks.    Radiation exposure: As with any exam that uses radiation, there is a very small increased risk of cancer. The amount of radiation in LDCT is small--about the same amount a person would get from a mammogram. Your doctor orders the exam when he or she feels the potential benefits outweigh the risks.    False negatives: No test is perfect, including LDCT. It is possible that you may have a medical condition, including lung cancer, that is not found during your exam. This is called a false negative result.    False positives and more testing: LDCT very often finds something in the lung that could be cancer, but in fact is not. This is called a false positive result. False positive tests often cause anxiety. To make sure these findings are not cancer, you may need to have more tests. These tests will be done only if you give us permission. Sometimes patients need a treatment that can have side effects, such as a biopsy. For more information on false positives, see  What can I expect from the results?     Findings not related to lung cancer: Your LDCT exam also takes pictures of areas of your body next to your lungs. In a very small number of cases, the CT scan will show an abnormal finding in one of these areas, such as your kidneys, adrenal glands, liver or thyroid. This finding may not be serious, but you may need more tests. Your doctor can help you decide what other tests you may need, if any.  What can I expect from the results?  About 1 out of 4 LDCT exams will find something that may need more tests. Most of the time, these findings are lung nodules. Lung nodules are very small collections of tissue in the lung. These nodules are very common, and the vast majority--more than 97 percent--are not cancer (benign). Most are normal lymph nodes or small areas of scarring from past infections.  But, if a small lung nodule is found to be  cancer, the cancer can be cured more than 90 percent of the time. To know if the nodule is cancer, we may need to get more images before your next yearly screening exam. If the nodule has suspicious features (for example, it is large, has an odd shape or grows over time), we will refer you to a specialist for further testing.  Will my doctor also get the results?  Yes. Your doctor will get a copy of your results.  Is it okay to keep smoking now that there s a cancer screening exam?  No. Tobacco is one of the strongest cancer-causing agents. It causes not only lung cancer, but other cancers and cardiovascular (heart) diseases as well. The damage caused by smoking builds over time. This means that the longer you smoke, the higher your risk of disease. While it is never too late to quit, the sooner you quit, the better.  Where can I find help to quit smoking?  The best way to prevent lung cancer is to stop smoking. If you have already quit smoking, congratulations and keep it up! For help on quitting smoking, please call [employer] at 6-593-099-HBCM (1275) or the American Cancer Society at 1-498.429.6440 to find local resources near you.  One-on-one health coaching:  If you d prefer to work individually with a health care provider on tobacco cessation, we offer:      Medication Therapy Management:  Our specially trained pharmacists work closely with you and your doctor to help you quit smoking.  Call [phone] or [phone] (toll free).     Can Do: Health coaching offered by [facility].  [email or web address]            Follow-ups after your visit        Your next 10 appointments already scheduled     May 09, 2018  3:00 PM CDT   (Arrive by 2:45 PM)   CONSULT with Senthil Aguirre,    [facility] ([facility] )    [address]  [place] [id number] [phone]            Jun 06, 2018  4:00 PM CDT   (Arrive by 3:45 PM)   ALYX ESPINO  "DIGITAL BILATERAL with MTMA1   [facility] ([facility] )    [address] [facility] [address]   [phone]           Do not use any powder, lotion or deodorant under your arms or on your breast. If you do, we will ask you to remove it before your exam.  Wear comfortable, two-piece clothing.  If you have any allergies, tell your care team.  Bring any previous mammograms from other facilities or have them mailed to the breast center.            Aug 22, 2018  1:15 PM CDT   (Arrive by 1:00 PM)   SHORT with Ana Valdez NP   [facility] ([facility] )    [address] [facility] [address]   [phone]              Future tests that were ordered for you today     Open Future Orders        Priority Expected Expires Ordered    CT Chest Lung Cancer Scrn Low Dose wo Routine  5/3/2019 5/3/2018            Who to contact     If you have questions or need follow up information about today's clinic visit or your schedule please contact [facility] directly at [phone].  Normal or non-critical lab and imaging results will be communicated to you by MyChart, letter or phone within 4 business days after the clinic has received the results. If you do not hear from us within 7 days, please contact the clinic through StudioTweet[employer]art or phone. If you have a critical or abnormal lab result, we will notify you by phone as soon as possible.  Submit refill requests through [employer] or call your pharmacy and they will forward the refill request to us. Please allow 3 business days for your refill to be completed.          Additional Information About Your Visit        StudioTweetshart Information     [employer] lets you send messages to your doctor, view your test results, renew your prescriptions, schedule appointments and more. To sign up, go to [email or web address] . Click on \"Log in\" on the left side of the screen, which will " "take you to the Welcome page. Then click on \"Sign up Now\" on the right side of the page.     You will be asked to enter the access code listed below, as well as some personal information. Please follow the directions to create your username and password.     Your access code is: [email or web address]  Expires: 2018 11:47 AM     Your access code will  in 90 days. If you need help or a new code, please call your [place] clinic or [phone].        Care EveryWhere ID     This is your Care EveryWhere ID. This could be used by other organizations to access your [place] medical records  [id number]        Your Vitals Were     Pulse Temperature Respirations Height Pulse Oximetry BMI (Body Mass Index)    80 98  F (36.7  C) (Tympanic) 18 5' 1.5\" (1.562 m) 94% 35.73 kg/m2       Blood Pressure from Last 3 Encounters:   18 145/88   18 158/90   18 (!) 180/110    Weight from Last 3 Encounters:   18 192 lb 3.2 oz (87.2 kg)   18 194 lb (88 kg)   18 191 lb 3.2 oz (86.7 kg)              We Performed the Following     Okay for Smoking Cessation Study (PLUTO) to Contact Patient     Prof fee: Shared Decisionmaking for Lung Cancer Screening        Primary Care Provider Office Phone # Fax #    Ana BakerARA mejia [phone] [address]  [place] [facility] [address]        Equal Access to Services     LATASHA COOL : Hadii simón quesada Sonaveed, waaxda luqadaha, qaybta kaalmachandrika barrios. So [facility] [phone].    ATENCIÓN: Si habla español, tiene a roy disposición servicios gratuitos de asistencia lingüística. Llame al [phone].    We comply with applicable federal civil rights laws and [place] laws. We do not discriminate on the basis of race, color, national origin, age, disability, sex, sexual orientation, or gender identity.            Thank you!     Thank you for choosing [facility]  for your care. Our goal is " always to provide you with excellent care. Hearing back from our patients is one way we can continue to improve our services. Please take a few minutes to complete the written survey that you may receive in the mail after your visit with us. Thank you!             Your Updated Medication List - Protect others around you: Learn how to safely use, store and throw away your medicines at www.disposemymeds.org.          This list is accurate as of 5/3/18 11:58 AM.  Always use your most recent med list.                   Brand Name Dispense Instructions for use Diagnosis    aspirin 81 MG EC tablet     90 tablet    Take 1 tablet (81 mg) by mouth daily    Benign essential hypertension       cholecalciferol 5000 units Tabs tablet    vitamin D3    90 tablet    Take 1 tablet (5,000 Units) by mouth daily    Encounter to establish care       DAILY MULTIPLE VITAMIN/IRON Tabs     90 tablet    Take 1 tablet by mouth daily    Encounter to establish care       losartan 50 MG tablet    COZAAR    45 tablet    Take 1.5 po qd    Benign essential hypertension       nicotine 21 MG/24HR 24 hr patch    NICODERM CQ    28 patch    Place 1 patch onto the skin every 24 hours    Tobacco abuse       omega 3 1000 MG Caps     90 capsule    3 po daily    Encounter to establish care, Benign essential hypertension       simvastatin 20 MG tablet    ZOCOR    90 tablet    Take 1 tablet (20 mg) by mouth At Bedtime    Hyperlipidemia LDL goal <100          no abrasions, no jaundice, no lesions, no pruritis, and no rashes.

## 2020-08-18 DIAGNOSIS — E11.9 TYPE 2 DIABETES MELLITUS WITHOUT COMPLICATION, WITHOUT LONG-TERM CURRENT USE OF INSULIN (H): ICD-10-CM

## 2020-08-18 NOTE — TELEPHONE ENCOUNTER
metformin      Last Written Prescription Date:  4/16/20  Last Fill Quantity: 60,   # refills: 3  Last Office Visit: 4/14/20  Future Office visit:    Next 5 appointments (look out 90 days)    Aug 31, 2020  9:15 AM CDT  (Arrive by 9:00 AM)  SHORT with Ana Valdez CNP  [facility] [place] ([facility] ) [address]  [facility] [address]  [phone]           Routing refill request to provider for review/approval because:

## 2020-08-20 RX ORDER — METFORMIN HCL 500 MG
500 TABLET, EXTENDED RELEASE 24 HR ORAL 2 TIMES DAILY WITH MEALS
Qty: 60 TABLET | Refills: 3 | Status: SHIPPED | OUTPATIENT
Start: 2020-08-20 | End: 2020-12-16

## 2020-08-25 NOTE — PROGRESS NOTES
Aga Gary is a 69 year old female who presents to clinic today for the following health issues:        Diabetes Follow-up    How often are you checking your blood sugar? Not at all    What concerns do you have today about your diabetes? Other: feet hurt      Do you have any of these symptoms? (Select all that apply)  Numbness in feet, Burning in feet and Weight loss    Have you had a diabetic eye exam in the last 12 months? No      Hyperlipidemia Follow-Up    Are you regularly taking any medication or supplement to lower your cholesterol?   Yes- as prescibed     Are you having muscle aches or other side effects that you think could be caused by your cholesterol lowering medication?  No      Hypertension Follow-up    Do you check your blood pressure regularly outside of the clinic? No     Are you following a low salt diet? Yes    Are your blood pressures ever more than 140 on the top number (systolic) OR more   than 90 on the bottom number (diastolic), for example 140/90? No         Posterior Neck Pain  2 weeks ago she was doing dishes, turned her head, heard a snapping sound - and since then has had posterior muscle discomfort and pain with lateral ROM.  She has tried to rest - has not tried an anti-inflammtory        BP Readings from Last 2 Encounters:   08/31/20 122/78   02/19/20 104/66       Hemoglobin A1C (%)   Date Value   08/28/2020 5.9 (H)   01/03/2020 7.2 (H)     LDL Cholesterol Calculated (mg/dL)   Date Value   08/28/2020 85   01/03/2020 119 (H)           How many servings of fruits and vegetables do you eat daily?  0-1    On average, how many sweetened beverages do you drink each day (Examples: soda, juice, sweet tea, etc.  Do NOT count diet or artificially sweetened beverages)?   0    How many days per week do you exercise enough to make your heart beat faster? 3 or less    How many minutes a day do you exercise enough to make your heart beat faster? 9 or less    How many days per week do you  miss taking your medication? 0        Patient Active Problem List   Diagnosis     Benign essential hypertension     Tobacco abuse     Hyperlipidemia LDL goal <100     Taking a statin medication     Morbid obesity (H)     Calculus of kidney     Type 2 diabetes mellitus without complication, without long-term current use of insulin (H)     Past Surgical History:   Procedure Laterality Date     ABDOMEN SURGERY      2 c-sections     CHOLECYSTECTOMY  1994     GYN SURGERY      total hyst,, no cervix     HC REMOVAL OF NAIL PLATE SIMPLE SINGLE  04/19/2018    removal of 1/4  left gt toenail, local      HEAD & NECK SURGERY      tumor neck, benign     ORTHOPEDIC SURGERY Bilateral     carpul tunnel        Social History     Tobacco Use     Smoking status: Heavy Tobacco Smoker     Packs/day: 1.00     Years: 40.00     Pack years: 40.00     Types: Cigarettes     Start date: 1/1/1972     Smokeless tobacco: Never Used     Tobacco comment: pt is slowly cutting down- 1 pack a day    Substance Use Topics     Alcohol use: No     Family History   Problem Relation Age of Onset     Other Cancer Mother      Other Cancer Brother      Breast Cancer Paternal Aunt              Current Outpatient Medications   Medication Sig Dispense Refill     amLODIPine (NORVASC) 10 MG tablet Take 1 tablet (10 mg) by mouth daily 90 tablet 1     aspirin 81 MG EC tablet Take 1 tablet (81 mg) by mouth daily 90 tablet 3     cholecalciferol (VITAMIN D3) 5000 units TABS tablet Take 1 tablet (5,000 Units) by mouth daily 90 tablet 11     losartan (COZAAR) 100 MG tablet TAKE 1 TABLET (100 MG) BY MOUTH DAILY 90 tablet 1     metFORMIN (GLUCOPHAGE-XR) 500 MG 24 hr tablet TAKE 1 TABLET (500 MG) BY MOUTH 2 TIMES DAILY (WITH MEALS) 60 tablet 3     metoprolol tartrate (LOPRESSOR) 50 MG tablet TAKE 1 TABLET BY MOUTH TWICE A DAY FOR HEART AND BLOOD PRESSURE. 180 tablet 1     Multiple Vitamins-Iron (DAILY MULTIPLE VITAMIN/IRON) TABS Take 1 tablet by mouth daily 90 tablet 11      omega 3 1000 MG CAPS 3 po daily (Patient taking differently: 1 capsule daily) 90 capsule 11     simvastatin (ZOCOR) 40 MG tablet TAKE 1 TABLET (40MG) BY MOUTH ONCE DAILY. 90 tablet 1     nystatin (MYCOSTATIN) 995340 UNIT/GM external cream Apply topically 2 times daily (Patient not taking: Reported on 8/31/2020) 60 g 1       No Known Allergies       Recent Labs   Lab Test 08/28/20  1330 01/03/20  1454 12/16/19  1628 07/24/19  1323   A1C 5.9* 7.2*  --   --    LDL 85 119*  --  99   HDL 42* 40*  --  38*   TRIG 150* 204*  --  158*   ALT 25 50 45 58*   CR 0.72 0.86 1.02 0.88   GFRESTIMATED 86 69 56* 67   GFRESTBLACK >90 80 65 78   POTASSIUM 4.1 4.0 3.5 4.1   TSH 2.86  --   --  2.78        BP Readings from Last 3 Encounters:   08/31/20 122/78   02/19/20 104/66   01/14/20 102/74    Wt Readings from Last 3 Encounters:   08/31/20 80 kg (176 lb 6.4 oz)   02/19/20 89.4 kg (197 lb)   01/14/20 89.4 kg (197 lb)           Review of Systems   Constitutional, HEENT, cardiovascular, pulmonary, GI, , musculoskeletal, neuro, skin, endocrine and psych systems are negative, except as otherwise noted.        Objective    /78 (BP Location: Right arm, Patient Position: Chair, Cuff Size: Adult Large)   Pulse 69   Temp 97.7  F (36.5  C) (Tympanic)   Wt 80 kg (176 lb 6.4 oz)   SpO2 94%   BMI 32.79 kg/m    Body mass index is 32.79 kg/m .       Physical Exam   GENERAL: healthy, alert and no distress  EYES: Eyes grossly normal to inspection, PERRL and conjunctivae and sclerae normal  NECK: no adenopathy, no asymmetry, masses, or scars and thyroid normal to palpation  RESP: lungs clear to auscultation - no rales, rhonchi or wheezes  CV: regular rate and rhythm, normal S1 S2, no S3 or S4, no murmur, click or rub, no peripheral edema and peripheral pulses strong  MS: Posterior cervical spine tightness, stiffness with ROM  SKIN: no suspicious lesions or rashes  PSYCH: mentation appears normal, affect normal/bright  Diabetic foot exam:  normal DP and PT pulses - plantar foot pain, sensitivity of toes, long nails        Ref Range & Units  3d ago       Hemoglobin A1C  0 - 5.6 %  5.9High       Comment: Normal <5.7% Prediabetes 5.7-6.4%  Diabetes 6.5% or higher - adopted from ADA   consensus guidelines.       Ref Range & Units  3d ago      Cholesterol  <200 mg/dL  157       Triglycerides  <150 mg/dL  150High       Comment: Borderline high:  150-199 mg/dl   High:             200-499 mg/dl   Very high:       >499 mg/dl   Fasting specimen      HDL Cholesterol  >49 mg/dL  42Low         LDL Cholesterol Calculated  <100 mg/dL  85     Comment: Desirable:       <100 mg/dl      Non HDL Cholesterol  <130 mg/dL  115     [facility]          Specimen Collected: 08/28/20  1:30 PM  Last Resulted: 08/28/20  4:25 PM          Ref Range & Units  3d ago      TSH  0.40 - 4.00 mU/L  2.86       Ref Range & Units  3d ago      Sodium  133 - 144 mmol/L  137       Potassium  3.4 - 5.3 mmol/L  4.1       Chloride  94 - 109 mmol/L  110High         Carbon Dioxide  20 - 32 mmol/L  21       Anion Gap  3 - 14 mmol/L  6       Glucose  70 - 99 mg/dL  114High       Comment: Fasting specimen      Urea Nitrogen  7 - 30 mg/dL  18       Creatinine  0.52 - 1.04 mg/dL  0.72       GFR Estimate  >60 mL/min/  86     Comment: Non  GFR Calc   Starting 12/18/2018, serum creatinine based estimated GFR (eGFR) will be   calculated using the Chronic Kidney Disease Epidemiology Collaboration   (CKD-EPI) equation.      GFR Estimate If Black  >60 mL/min/  >90     Comment:  GFR Calc   Starting 12/18/2018, serum creatinine based estimated GFR (eGFR) will be   calculated using the Chronic Kidney Disease Epidemiology Collaboration   (CKD-EPI) equation.      Calcium  8.5 - 10.1 mg/dL  9.5       Bilirubin Total  0.2 - 1.3 mg/dL  0.6       Albumin  3.4 - 5.0 g/dL  3.9       Protein Total  6.8 - 8.8 g/dL  8.6       Alkaline  Phosphatase  40 - 150 U/L  69       ALT  0 - 50 U/L  25       AST  0 - 45 U/L  15       Ref Range & Units  3d ago      Color Urine   Yellow       Appearance Urine   Clear       Glucose Urine  NEG^Negative mg/dL  Negative       Bilirubin Urine  NEG^Negative  Negative       Ketones Urine  NEG^Negative mg/dL  Negative       Specific Gravity Urine  1.003 - 1.035  1.015       Blood Urine  NEG^Negative  Negative       pH Urine  5.0 - 7.0 pH  6.0       Protein Albumin Urine  NEG^Negative mg/dL  Negative       Urobilinogen Urine  0.2 - 1.0 EU/dL  0.2       Nitrite Urine  NEG^Negative  Negative       Leukocyte Esterase Urine  NEG^Negative  Negative       Source   Midstream Urine        The 10-year ASCVD risk score (Sonya NORWOOD Jr., et al., 2013) is: 27.5%    Values used to calculate the score:      Age: 69 years      Sex: Female      Is Non- : No      Diabetic: Yes      Tobacco smoker: Yes      Systolic Blood Pressure: 122 mmHg      Is BP treated: Yes      HDL Cholesterol: 42 mg/dL      Total Cholesterol: 157 mg/dL         Assessment & Plan     Type 2 diabetes mellitus without complication, without long-term current use of insulin (H)  - Continue plan of care    Hyperlipidemia LDL goal <100  - Continue plan of care    Taking a statin medication  - Continue plan of care    Benign essential hypertension  - amLODIPine (NORVASC) 10 MG tablet; Take 1 tablet (10 mg) by mouth daily    Encounter for screening mammogram for breast cancer  - MA SCREENING DIGITAL BILATERAL ([place]); Future    Neck sprain, initial encounter  - PHYSICAL THERAPY REFERRAL; Future  - Ibuprofen  - Heat or Ice    Diabetic polyneuropathy associated with type 2 diabetes mellitus (H)  - ORTHOPEDIC ADULT REFERRAL; Future    Foot pain, bilateral  - ORTHOPEDIC ADULT REFERRAL; Future         Return in about 6 months (around 2/28/2021) for Chronic disease management, am fasting, PT - [place], Podiatry - [place] or first available.       Ana  RENNY Valdez  [facility]

## 2020-08-28 DIAGNOSIS — Z79.899 TAKING A STATIN MEDICATION: ICD-10-CM

## 2020-08-28 DIAGNOSIS — I10 BENIGN ESSENTIAL HYPERTENSION: Primary | ICD-10-CM

## 2020-08-28 DIAGNOSIS — E78.5 HYPERLIPIDEMIA LDL GOAL <100: ICD-10-CM

## 2020-08-28 DIAGNOSIS — E11.9 TYPE 2 DIABETES MELLITUS WITHOUT COMPLICATION, WITHOUT LONG-TERM CURRENT USE OF INSULIN (H): ICD-10-CM

## 2020-08-28 DIAGNOSIS — I10 BENIGN ESSENTIAL HYPERTENSION: ICD-10-CM

## 2020-08-28 LAB
ALBUMIN SERPL-MCNC: 3.9 G/DL (ref 3.4–5)
ALBUMIN UR-MCNC: NEGATIVE MG/DL
ALP SERPL-CCNC: 69 U/L (ref 40–150)
ALT SERPL W P-5'-P-CCNC: 25 U/L (ref 0–50)
ANION GAP SERPL CALCULATED.3IONS-SCNC: 6 MMOL/L (ref 3–14)
APPEARANCE UR: CLEAR
AST SERPL W P-5'-P-CCNC: 15 U/L (ref 0–45)
BILIRUB SERPL-MCNC: 0.6 MG/DL (ref 0.2–1.3)
BILIRUB UR QL STRIP: NEGATIVE
BUN SERPL-MCNC: 18 MG/DL (ref 7–30)
CALCIUM SERPL-MCNC: 9.5 MG/DL (ref 8.5–10.1)
CHLORIDE SERPL-SCNC: 110 MMOL/L (ref 94–109)
CHOLEST SERPL-MCNC: 157 MG/DL
CO2 SERPL-SCNC: 21 MMOL/L (ref 20–32)
COLOR UR AUTO: YELLOW
CREAT SERPL-MCNC: 0.72 MG/DL (ref 0.52–1.04)
CREAT UR-MCNC: 189 MG/DL
EST. AVERAGE GLUCOSE BLD GHB EST-MCNC: 123 MG/DL
GFR SERPL CREATININE-BSD FRML MDRD: 86 ML/MIN/{1.73_M2}
GLUCOSE SERPL-MCNC: 114 MG/DL (ref 70–99)
GLUCOSE UR STRIP-MCNC: NEGATIVE MG/DL
HBA1C MFR BLD: 5.9 % (ref 0–5.6)
HDLC SERPL-MCNC: 42 MG/DL
HGB UR QL STRIP: NEGATIVE
KETONES UR STRIP-MCNC: NEGATIVE MG/DL
LDLC SERPL CALC-MCNC: 85 MG/DL
LEUKOCYTE ESTERASE UR QL STRIP: NEGATIVE
MICROALBUMIN UR-MCNC: 15 MG/L
MICROALBUMIN/CREAT UR: 7.78 MG/G CR (ref 0–25)
NITRATE UR QL: NEGATIVE
NONHDLC SERPL-MCNC: 115 MG/DL
PH UR STRIP: 6 PH (ref 5–7)
POTASSIUM SERPL-SCNC: 4.1 MMOL/L (ref 3.4–5.3)
PROT SERPL-MCNC: 8.6 G/DL (ref 6.8–8.8)
SODIUM SERPL-SCNC: 137 MMOL/L (ref 133–144)
SOURCE: NORMAL
SP GR UR STRIP: 1.01 (ref 1–1.03)
TRIGL SERPL-MCNC: 150 MG/DL
TSH SERPL DL<=0.005 MIU/L-ACNC: 2.86 MU/L (ref 0.4–4)
UROBILINOGEN UR STRIP-ACNC: 0.2 EU/DL (ref 0.2–1)

## 2020-08-28 PROCEDURE — 82043 UR ALBUMIN QUANTITATIVE: CPT | Mod: ZL | Performed by: NURSE PRACTITIONER

## 2020-08-28 PROCEDURE — 81003 URINALYSIS AUTO W/O SCOPE: CPT | Mod: ZL | Performed by: NURSE PRACTITIONER

## 2020-08-28 PROCEDURE — 80053 COMPREHEN METABOLIC PANEL: CPT | Mod: ZL | Performed by: NURSE PRACTITIONER

## 2020-08-28 PROCEDURE — 80061 LIPID PANEL: CPT | Mod: ZL | Performed by: NURSE PRACTITIONER

## 2020-08-28 PROCEDURE — 83036 HEMOGLOBIN GLYCOSYLATED A1C: CPT | Mod: ZL | Performed by: NURSE PRACTITIONER

## 2020-08-28 PROCEDURE — 84443 ASSAY THYROID STIM HORMONE: CPT | Mod: ZL | Performed by: NURSE PRACTITIONER

## 2020-08-28 PROCEDURE — 36415 COLL VENOUS BLD VENIPUNCTURE: CPT | Mod: ZL | Performed by: NURSE PRACTITIONER

## 2020-08-28 PROCEDURE — 40000788 ZZHCL STATISTIC ESTIMATED AVERAGE GLUCOSE: Mod: ZL | Performed by: NURSE PRACTITIONER

## 2020-08-31 ENCOUNTER — OFFICE VISIT (OUTPATIENT)
Dept: FAMILY MEDICINE | Facility: OTHER | Age: 70
End: 2020-08-31
Attending: NURSE PRACTITIONER
Payer: COMMERCIAL

## 2020-08-31 VITALS
SYSTOLIC BLOOD PRESSURE: 122 MMHG | OXYGEN SATURATION: 94 % | DIASTOLIC BLOOD PRESSURE: 78 MMHG | BODY MASS INDEX: 32.79 KG/M2 | TEMPERATURE: 97.7 F | HEART RATE: 69 BPM | WEIGHT: 176.4 LBS

## 2020-08-31 DIAGNOSIS — I10 BENIGN ESSENTIAL HYPERTENSION: ICD-10-CM

## 2020-08-31 DIAGNOSIS — E11.9 TYPE 2 DIABETES MELLITUS WITHOUT COMPLICATION, WITHOUT LONG-TERM CURRENT USE OF INSULIN (H): Primary | ICD-10-CM

## 2020-08-31 DIAGNOSIS — M79.672 FOOT PAIN, BILATERAL: ICD-10-CM

## 2020-08-31 DIAGNOSIS — Z12.31 ENCOUNTER FOR SCREENING MAMMOGRAM FOR BREAST CANCER: ICD-10-CM

## 2020-08-31 DIAGNOSIS — E78.5 HYPERLIPIDEMIA LDL GOAL <100: ICD-10-CM

## 2020-08-31 DIAGNOSIS — E11.42 DIABETIC POLYNEUROPATHY ASSOCIATED WITH TYPE 2 DIABETES MELLITUS (H): ICD-10-CM

## 2020-08-31 DIAGNOSIS — Z23 NEED FOR PROPHYLACTIC VACCINATION AND INOCULATION AGAINST INFLUENZA: ICD-10-CM

## 2020-08-31 DIAGNOSIS — M79.671 FOOT PAIN, BILATERAL: ICD-10-CM

## 2020-08-31 DIAGNOSIS — Z79.899 TAKING A STATIN MEDICATION: ICD-10-CM

## 2020-08-31 DIAGNOSIS — S13.9XXA NECK SPRAIN, INITIAL ENCOUNTER: ICD-10-CM

## 2020-08-31 PROCEDURE — 90732 PPSV23 VACC 2 YRS+ SUBQ/IM: CPT

## 2020-08-31 PROCEDURE — 99214 OFFICE O/P EST MOD 30 MIN: CPT | Performed by: NURSE PRACTITIONER

## 2020-08-31 PROCEDURE — 90732 PPSV23 VACC 2 YRS+ SUBQ/IM: CPT | Performed by: NURSE PRACTITIONER

## 2020-08-31 PROCEDURE — G0009 ADMIN PNEUMOCOCCAL VACCINE: HCPCS | Performed by: NURSE PRACTITIONER

## 2020-08-31 PROCEDURE — G0463 HOSPITAL OUTPT CLINIC VISIT: HCPCS | Mod: 25

## 2020-08-31 PROCEDURE — G0463 HOSPITAL OUTPT CLINIC VISIT: HCPCS

## 2020-08-31 RX ORDER — AMLODIPINE BESYLATE 10 MG/1
10 TABLET ORAL DAILY
Qty: 90 TABLET | Refills: 1 | Status: SHIPPED | OUTPATIENT
Start: 2020-08-31 | End: 2021-01-19

## 2020-08-31 ASSESSMENT — PAIN SCALES - GENERAL: PAINLEVEL: EXTREME PAIN (8)

## 2020-08-31 NOTE — PATIENT INSTRUCTIONS
Assessment & Plan     Type 2 diabetes mellitus without complication, without long-term current use of insulin (H)  - Continue plan of care    Hyperlipidemia LDL goal <100  - Continue plan of care    Taking a statin medication  - Continue plan of care    Benign essential hypertension  - amLODIPine (NORVASC) 10 MG tablet; Take 1 tablet (10 mg) by mouth daily    Encounter for screening mammogram for breast cancer  - MA SCREENING DIGITAL BILATERAL ([place]); Future    Neck sprain, initial encounter  - PHYSICAL THERAPY REFERRAL; Future  - Ibuprofen  - Heat or Ice    Diabetic polyneuropathy associated with type 2 diabetes mellitus (H)  - ORTHOPEDIC ADULT REFERRAL; Future    Foot pain, bilateral  - ORTHOPEDIC ADULT REFERRAL; Future         Return in about 6 months (around 2/28/2021) for Chronic disease management, am fasting, PT - [place], Podiatry - [place] or first available.       Ana Valdez, CNP  [facility] [place] [facility]

## 2020-08-31 NOTE — NURSING NOTE
"Chief Complaint   Patient presents with     Diabetes       Initial /78 (BP Location: Right arm, Patient Position: Chair, Cuff Size: Adult Large)   Pulse 69   Temp 97.7  F (36.5  C) (Tympanic)   Wt 80 kg (176 lb 6.4 oz)   SpO2 94%   BMI 32.79 kg/m   Estimated body mass index is 32.79 kg/m  as calculated from the following:    Height as of 8/28/18: 1.562 m (5' 1.5\").    Weight as of this encounter: 80 kg (176 lb 6.4 oz).  Medication Reconciliation: complete  Bertrand Lakhani LPN  "

## 2020-09-16 ENCOUNTER — OFFICE VISIT (OUTPATIENT)
Dept: PODIATRY | Facility: OTHER | Age: 70
End: 2020-09-16
Attending: NURSE PRACTITIONER
Payer: COMMERCIAL

## 2020-09-16 VITALS
OXYGEN SATURATION: 94 % | HEIGHT: 61 IN | WEIGHT: 177 LBS | RESPIRATION RATE: 16 BRPM | TEMPERATURE: 97.5 F | SYSTOLIC BLOOD PRESSURE: 120 MMHG | BODY MASS INDEX: 33.42 KG/M2 | HEART RATE: 57 BPM | DIASTOLIC BLOOD PRESSURE: 70 MMHG

## 2020-09-16 DIAGNOSIS — L60.3 ONYCHODYSTROPHY: ICD-10-CM

## 2020-09-16 DIAGNOSIS — E11.9 DIABETES MELLITUS TYPE 2, INSULIN DEPENDENT (H): ICD-10-CM

## 2020-09-16 DIAGNOSIS — M79.672 FOOT PAIN, BILATERAL: ICD-10-CM

## 2020-09-16 DIAGNOSIS — Z71.6 TOBACCO ABUSE COUNSELING: ICD-10-CM

## 2020-09-16 DIAGNOSIS — E11.42 DIABETIC POLYNEUROPATHY ASSOCIATED WITH TYPE 2 DIABETES MELLITUS (H): ICD-10-CM

## 2020-09-16 DIAGNOSIS — Z79.4 DIABETES MELLITUS TYPE 2, INSULIN DEPENDENT (H): ICD-10-CM

## 2020-09-16 DIAGNOSIS — F17.210 CIGARETTE NICOTINE DEPENDENCE WITHOUT COMPLICATION: ICD-10-CM

## 2020-09-16 DIAGNOSIS — M79.671 FOOT PAIN, BILATERAL: ICD-10-CM

## 2020-09-16 PROCEDURE — G0463 HOSPITAL OUTPT CLINIC VISIT: HCPCS

## 2020-09-16 PROCEDURE — 99203 OFFICE O/P NEW LOW 30 MIN: CPT | Performed by: PODIATRIST

## 2020-09-16 RX ORDER — CAPSAICIN 0.025 %
1 CREAM (GRAM) TOPICAL 3 TIMES DAILY
Qty: 120 G | Refills: 3 | Status: SHIPPED | OUTPATIENT
Start: 2020-09-16

## 2020-09-16 ASSESSMENT — PAIN SCALES - GENERAL: PAINLEVEL: SEVERE PAIN (7)

## 2020-09-16 ASSESSMENT — MIFFLIN-ST. JEOR: SCORE: 1270.25

## 2020-09-16 NOTE — LETTER
"    9/16/2020         RE: Aga Gary  [address] [place] [address]        Dear Colleague,    Thank you for referring your patient, Aga Gary, to the [facility]. Please see a copy of my visit note below.    Chief complaint: Patient presents with:  Consult: diabetic polyneuropathy        History of Present Illness: This 69 year old female is seen at the request of Flaim for evaluation and suggestions of management of bilateral foot pain. She feels like she had a million needles going through her feet. She also has some burning and numbness in the feet. This has been going on for about a year since the fall of 2019. The pain has worsened since it started and it has become really bad the past couple of months since around July, 2020.    She was just diagnosed with type 2 diabetes in March, 2020. She is on metformin. She has also lost about 20 pounds since being diagnosed.     She also has difficulty trimming her toenails. Her feet are so painful that she does not want to touch her feet and her nails are too thick to trim with a regular nail clipper.    Last HbA1C was 5.9% on 08/28/2020.    Previously 7.2% on 01/03/2020.    She smokes 1 ppd and she is not interested in quitting today.        /70 (BP Location: Left arm, Cuff Size: Adult Regular)   Pulse 57   Temp 97.5  F (36.4  C) (Tympanic)   Resp 16   Ht 1.549 m (5' 1\")   Wt 80.3 kg (177 lb)   SpO2 94%   BMI 33.44 kg/m      Patient Active Problem List   Diagnosis     Benign essential hypertension     Tobacco abuse     Hyperlipidemia LDL goal <100     Taking a statin medication     Morbid obesity (H)     Calculus of kidney     Type 2 diabetes mellitus without complication, without long-term current use of insulin (H)       Past Surgical History:   Procedure Laterality Date     ABDOMEN SURGERY      2 c-sections     CHOLECYSTECTOMY  1994     GYN SURGERY      total hyst,, no cervix     HC REMOVAL OF NAIL PLATE " SIMPLE SINGLE  04/19/2018    removal of 1/4  left gt toenail, local      HEAD & NECK SURGERY      tumor neck, benign     ORTHOPEDIC SURGERY Bilateral     carpul tunnel        Current Outpatient Medications   Medication     amLODIPine (NORVASC) 10 MG tablet     aspirin 81 MG EC tablet     cholecalciferol (VITAMIN D3) 5000 units TABS tablet     losartan (COZAAR) 100 MG tablet     metFORMIN (GLUCOPHAGE-XR) 500 MG 24 hr tablet     metoprolol tartrate (LOPRESSOR) 50 MG tablet     Multiple Vitamins-Iron (DAILY MULTIPLE VITAMIN/IRON) TABS     nystatin (MYCOSTATIN) 123682 UNIT/GM external cream     omega 3 1000 MG CAPS     simvastatin (ZOCOR) 40 MG tablet     No current facility-administered medications for this visit.         No Known Allergies    Family History   Problem Relation Age of Onset     Other Cancer Mother      Other Cancer Brother      Breast Cancer Paternal Aunt        Social History     Socioeconomic History     Marital status:      Spouse name: None     Number of children: None     Years of education: None     Highest education level: None   Occupational History     None   Social Needs     Financial resource strain: None     Food insecurity     Worry: None     Inability: None     Transportation needs     Medical: None     Non-medical: None   Tobacco Use     Smoking status: Heavy Tobacco Smoker     Packs/day: 1.00     Years: 40.00     Pack years: 40.00     Types: Cigarettes     Start date: 1/1/1972     Smokeless tobacco: Never Used     Tobacco comment: pt is slowly cutting down- 1 pack a day    Substance and Sexual Activity     Alcohol use: No     Drug use: No     Sexual activity: Not Currently     Partners: Male   Lifestyle     Physical activity     Days per week: None     Minutes per session: None     Stress: None   Relationships     Social connections     Talks on phone: None     Gets together: None     Attends [employer] service: None     Active member of club or organization: None     Attends  meetings of clubs or organizations: None     Relationship status: None     Intimate partner violence     Fear of current or ex partner: None     Emotionally abused: None     Physically abused: None     Forced sexual activity: None   Other Topics Concern     Parent/sibling w/ CABG, MI or angioplasty before 65F 55M? Not Asked   Social History Narrative     None       ROS: 10 point ROS neg other than the symptoms noted above in the HPI.  EXAM  Constitutional: healthy, alert and no distress    Psychiatric: mentation appears normal and affect normal/bright    VASCULAR:  -Dorsalis pedis pulse +2/4 b/l  -Posterior tibial pulse +1/4 b/l  -Capillary refill time < 3 seconds to b/l hallux  -Hair growth Absent to b/l anterior legs and ankles  NEURO:  -Protective sensation intact with SWM +10/10 RIGHT and +10/10 LEFT on 09/16/2020  -Light touch sensation intact to b/l plantar forefoot  -Positive for paresthesias to the bilateral plantar and dorsal foot   DERM:  -Skin temperature within normal limits to bilateral foot  -Skin dry to bilateral foot   -Toenails elongated, thickened, dystrophic and discolored x 10  ---Mild incurvation to the bilateral border of the bilateral hallux -- no erythema and edema and no drainage to borders today  MSK:  -No pain on palpation to bilateral hallux bilateral nail borders  -Muscle strength of ankles +5/5 for dorsiflexion, plantarflexion, ABDUction and ADDuction b/l  ============================================================    ASSESSMENT:  (E11.42) Diabetic polyneuropathy associated with type 2 diabetes mellitus (H)  (primary encounter diagnosis)    (M79.671,  M79.672) Foot pain, bilateral  C  (E11.9,  Z79.4) Diabetes mellitus type 2, insulin dependent (H)    (L60.3) Onychodystrophy      PLAN:  -Patient evaluated and examined. Treatment options discussed with no educational barriers noted.    -Diabetic Foot Education provided. This included checking the feet daily looking for new new blisters  or wounds, wearing shoes at all times when walking including around the house, and avoiding lotion application between the toes. Any sign of infection in the foot warrant's the patient presenting to the ED as soon as possible.    -Neuropathy pain education:   ---Patient's description of foot pain is a classic description of nerve pain. This can be more challenging to treat since it is not an injury or musculoskeletal issue that may respond better to an insert or injection.  ---Will attempt Capsaicin for the feet, but if he does not notice any improvement, he is advised to discuss neuropathy medication management options with the patient's PCP about medication management.    -Capsaicin cream will be ordered today.   ---Only apply this with gloves on your hands  ---The cream will burn so so start with a small amount  ---Try to use this for 2-3 weeks before deciding if it does or does not work. If it works well, you may continue to use this  ---Apply socks immediately after applying the cream to your feet   ---Wash hands thoroughly after applying the cream  ---if cream is not decreasing the night pain, see your primary care provider to discuss medications to help with that night pain    Toenail fungi:  -Discussed etiologies and treatment options for onychodystrophy. There may be fungi in the toenail, but it is not known until a nail culture is performed. Treatment options consist of leaving the toenail as is, treating the nail for fungi (culture would be taken today to confirm nail fungi), a nail avulsion and treating the fungi as the nail grows back in, or removing the toenail permanently. After reviewing risks and benefits, patient has decided to proceed with no treatment at this time.    -Patient is interested in toenail debridement but she just did them herself. She'd like to schedule a follow-up in 9 weeks for nail debridement.    -Orthotist referral for DM shoes and inserts in [place]  ---Discussed DM shoes and  advantages to DM shoes. DM shoes have a thicker sole which helps protect the feet from puncture wounds of sharp objects that can puncture through shoes. They also have more depth to the toe box and a wider toe box in attempt to prevent the shoes from rubbing on the toes and causing blisters/wounds. Additionally, the shoes come with a custom molded insert that is designed to also attempt to prevent blisters or ulcers of the foot. Blisters and ulcers can still occur while wearing DM shoes (espeically when the shoes are new), but they are aimed at helping prevent blisters and ulcers. The patient is in agreement with this plan and would like to try DM shoes.    -Tobacco cessation discussed with patient including the benefits of quitting and the risks of not quitting. The Quit Plan referral was offered and patient stated she does not want the referral. Patient is not interested in quitting today.    -Patient in agreement with the above treatment plan and all of patient's questions were answered.      RTC 63+ days for high risk nail debridement and diabetic foot exam         Judith King DPM    Again, thank you for allowing me to participate in the care of your patient.        Sincerely,        Judith King DPM

## 2020-09-16 NOTE — NURSING NOTE
"Chief Complaint   Patient presents with     Consult     diabetic polyneuropathy       Initial /70 (BP Location: Left arm, Cuff Size: Adult Regular)   Pulse 57   Temp 97.5  F (36.4  C) (Tympanic)   Resp 16   Ht 1.549 m (5' 1\")   Wt 80.3 kg (177 lb)   SpO2 94%   BMI 33.44 kg/m   Estimated body mass index is 33.44 kg/m  as calculated from the following:    Height as of this encounter: 1.549 m (5' 1\").    Weight as of this encounter: 80.3 kg (177 lb).  Medication Reconciliation: complete  Diane Kwok LPN  "

## 2020-09-16 NOTE — PROGRESS NOTES
"Chief complaint: Patient presents with:  Consult: diabetic polyneuropathy        History of Present Illness: This 69 year old female is seen at the request of José Miguel for evaluation and suggestions of management of bilateral foot pain. She feels like she had a million needles going through her feet. She also has some burning and numbness in the feet. This has been going on for about a year since the fall of 2019. The pain has worsened since it started and it has become really bad the past couple of months since around July, 2020.    She was just diagnosed with type 2 diabetes in March, 2020. She is on metformin. She has also lost about 20 pounds since being diagnosed.     She also has difficulty trimming her toenails. Her feet are so painful that she does not want to touch her feet and her nails are too thick to trim with a regular nail clipper.    Last HbA1C was 5.9% on 08/28/2020.    Previously 7.2% on 01/03/2020.    She smokes 1 ppd and she is not interested in quitting today.        /70 (BP Location: Left arm, Cuff Size: Adult Regular)   Pulse 57   Temp 97.5  F (36.4  C) (Tympanic)   Resp 16   Ht 1.549 m (5' 1\")   Wt 80.3 kg (177 lb)   SpO2 94%   BMI 33.44 kg/m      Patient Active Problem List   Diagnosis     Benign essential hypertension     Tobacco abuse     Hyperlipidemia LDL goal <100     Taking a statin medication     Morbid obesity (H)     Calculus of kidney     Type 2 diabetes mellitus without complication, without long-term current use of insulin (H)       Past Surgical History:   Procedure Laterality Date     ABDOMEN SURGERY      2 c-sections     CHOLECYSTECTOMY  1994     GYN SURGERY      total hyst,, no cervix     HC REMOVAL OF NAIL PLATE SIMPLE SINGLE  04/19/2018    removal of 1/4  left gt toenail, local      HEAD & NECK SURGERY      tumor neck, benign     ORTHOPEDIC SURGERY Bilateral     carpul tunnel        Current Outpatient Medications   Medication     amLODIPine (NORVASC) 10 MG tablet "     aspirin 81 MG EC tablet     cholecalciferol (VITAMIN D3) 5000 units TABS tablet     losartan (COZAAR) 100 MG tablet     metFORMIN (GLUCOPHAGE-XR) 500 MG 24 hr tablet     metoprolol tartrate (LOPRESSOR) 50 MG tablet     Multiple Vitamins-Iron (DAILY MULTIPLE VITAMIN/IRON) TABS     nystatin (MYCOSTATIN) 968073 UNIT/GM external cream     omega 3 1000 MG CAPS     simvastatin (ZOCOR) 40 MG tablet     No current facility-administered medications for this visit.         No Known Allergies    Family History   Problem Relation Age of Onset     Other Cancer Mother      Other Cancer Brother      Breast Cancer Paternal Aunt        Social History     Socioeconomic History     Marital status:      Spouse name: None     Number of children: None     Years of education: None     Highest education level: None   Occupational History     None   Social Needs     Financial resource strain: None     Food insecurity     Worry: None     Inability: None     Transportation needs     Medical: None     Non-medical: None   Tobacco Use     Smoking status: Heavy Tobacco Smoker     Packs/day: 1.00     Years: 40.00     Pack years: 40.00     Types: Cigarettes     Start date: 1/1/1972     Smokeless tobacco: Never Used     Tobacco comment: pt is slowly cutting down- 1 pack a day    Substance and Sexual Activity     Alcohol use: No     Drug use: No     Sexual activity: Not Currently     Partners: Male   Lifestyle     Physical activity     Days per week: None     Minutes per session: None     Stress: None   Relationships     Social connections     Talks on phone: None     Gets together: None     Attends Denominational service: None     Active member of club or organization: None     Attends meetings of clubs or organizations: None     Relationship status: None     Intimate partner violence     Fear of current or ex partner: None     Emotionally abused: None     Physically abused: None     Forced sexual activity: None   Other Topics Concern      Parent/sibling w/ CABG, MI or angioplasty before 65F 55M? Not Asked   Social History Narrative     None       ROS: 10 point ROS neg other than the symptoms noted above in the HPI.  EXAM  Constitutional: healthy, alert and no distress    Psychiatric: mentation appears normal and affect normal/bright    VASCULAR:  -Dorsalis pedis pulse +2/4 b/l  -Posterior tibial pulse +1/4 b/l  -Capillary refill time < 3 seconds to b/l hallux  -Hair growth Absent to b/l anterior legs and ankles  NEURO:  -Protective sensation intact with SWM +10/10 RIGHT and +10/10 LEFT on 09/16/2020  -Light touch sensation intact to b/l plantar forefoot  -Positive for paresthesias to the bilateral plantar and dorsal foot   DERM:  -Skin temperature within normal limits to bilateral foot  -Skin dry to bilateral foot   -Toenails elongated, thickened, dystrophic and discolored x 10  ---Mild incurvation to the bilateral border of the bilateral hallux -- no erythema and edema and no drainage to borders today  MSK:  -No pain on palpation to bilateral hallux bilateral nail borders  -Muscle strength of ankles +5/5 for dorsiflexion, plantarflexion, ABDUction and ADDuction b/l  ============================================================    ASSESSMENT:  (E11.42) Diabetic polyneuropathy associated with type 2 diabetes mellitus (H)  (primary encounter diagnosis)    (M79.671,  M79.672) Foot pain, bilateral  C  (E11.9,  Z79.4) Diabetes mellitus type 2, insulin dependent (H)    (L60.3) Onychodystrophy      PLAN:  -Patient evaluated and examined. Treatment options discussed with no educational barriers noted.    -Diabetic Foot Education provided. This included checking the feet daily looking for new new blisters or wounds, wearing shoes at all times when walking including around the house, and avoiding lotion application between the toes. Any sign of infection in the foot warrant's the patient presenting to the ED as soon as possible.    -Neuropathy pain education:    ---Patient's description of foot pain is a classic description of nerve pain. This can be more challenging to treat since it is not an injury or musculoskeletal issue that may respond better to an insert or injection.  ---Will attempt Capsaicin for the feet, but if he does not notice any improvement, he is advised to discuss neuropathy medication management options with the patient's PCP about medication management.    -Capsaicin cream will be ordered today.   ---Only apply this with gloves on your hands  ---The cream will burn so so start with a small amount  ---Try to use this for 2-3 weeks before deciding if it does or does not work. If it works well, you may continue to use this  ---Apply socks immediately after applying the cream to your feet   ---Wash hands thoroughly after applying the cream  ---if cream is not decreasing the night pain, see your primary care provider to discuss medications to help with that night pain    Toenail fungi:  -Discussed etiologies and treatment options for onychodystrophy. There may be fungi in the toenail, but it is not known until a nail culture is performed. Treatment options consist of leaving the toenail as is, treating the nail for fungi (culture would be taken today to confirm nail fungi), a nail avulsion and treating the fungi as the nail grows back in, or removing the toenail permanently. After reviewing risks and benefits, patient has decided to proceed with no treatment at this time.    -Patient is interested in toenail debridement but she just did them herself. She'd like to schedule a follow-up in 9 weeks for nail debridement.    -Orthotist referral for DM shoes and inserts in [facility]  ---Discussed DM shoes and advantages to DM shoes. DM shoes have a thicker sole which helps protect the feet from puncture wounds of sharp objects that can puncture through shoes. They also have more depth to the toe box and a wider toe box in attempt to prevent the shoes from rubbing  on the toes and causing blisters/wounds. Additionally, the shoes come with a custom molded insert that is designed to also attempt to prevent blisters or ulcers of the foot. Blisters and ulcers can still occur while wearing DM shoes (espeically when the shoes are new), but they are aimed at helping prevent blisters and ulcers. The patient is in agreement with this plan and would like to try DM shoes.    -Tobacco cessation discussed with patient including the benefits of quitting and the risks of not quitting. The Quit Plan referral was offered and patient stated she does not want the referral. Patient is not interested in quitting today.    -Patient in agreement with the above treatment plan and all of patient's questions were answered.      RTC 63+ days for high risk nail debridement and diabetic foot exam         Judith King DPM

## 2020-09-16 NOTE — PATIENT INSTRUCTIONS
-Capsaicin cream will be ordered today.   ---Only apply this with gloves on your hands  ---The cream will burn so so start with a small amount  ---Try to use this for 2-3 weeks before deciding if it does or does not work. If it works well, you may continue to use this  ---Apply socks immediately after applying the cream to your feet   ---Wash hands thoroughly after applying the cream  ---if cream is not decreasing the night pain, see your primary care provider to discuss medications to help with that night pain    Diabetic shoes:  -You will be called to schedule this at the [facility] location    -Diabetic Foot Education:  ---Check the feet daily looking for new new blisters or wounds  ---Wear shoes at all times when walking including around the house  ---Avoid lotion application between the toes.   ---If you have any open wounds with signs of infection (redness, swelling, pain, purulence, fever, chills, nausea, vomiting), go to the Emergency Department as soon as possible.

## 2020-10-27 DIAGNOSIS — I10 BENIGN ESSENTIAL HYPERTENSION: ICD-10-CM

## 2020-10-28 RX ORDER — METOPROLOL TARTRATE 50 MG
TABLET ORAL
Qty: 180 TABLET | Refills: 1 | Status: SHIPPED | OUTPATIENT
Start: 2020-10-28 | End: 2021-04-27

## 2020-10-28 NOTE — TELEPHONE ENCOUNTER
metoprolol  Last Written Prescription Date: 4/27/2020  Last Fill Quantity: 180 # of Refills: 1  Last Office Visit: 8/31/2020

## 2020-10-30 DIAGNOSIS — I10 BENIGN ESSENTIAL HYPERTENSION: ICD-10-CM

## 2020-11-02 RX ORDER — LOSARTAN POTASSIUM 100 MG/1
TABLET ORAL
Qty: 90 TABLET | Refills: 1 | Status: SHIPPED | OUTPATIENT
Start: 2020-11-02 | End: 2021-04-27

## 2020-11-02 NOTE — TELEPHONE ENCOUNTER
losartan      Last Written Prescription Date:  5/4/2020  Last Fill Quantity: 90,   # refills: 1  Last Office Visit: 8/31/20  Future Office visit:    Next 5 appointments (look out 90 days)    Nov 18, 2020  3:30 PM  (Arrive by 3:15 PM)  Return Visit with Judith King DPM  [facility] ([facility] ) [address] [place] [id number] [phone]

## 2020-11-09 DIAGNOSIS — E78.5 HYPERLIPIDEMIA LDL GOAL <100: ICD-10-CM

## 2020-11-10 RX ORDER — SIMVASTATIN 40 MG
TABLET ORAL
Qty: 90 TABLET | Refills: 1 | Status: SHIPPED | OUTPATIENT
Start: 2020-11-10 | End: 2021-05-17

## 2020-12-16 DIAGNOSIS — E11.9 TYPE 2 DIABETES MELLITUS WITHOUT COMPLICATION, WITHOUT LONG-TERM CURRENT USE OF INSULIN (H): ICD-10-CM

## 2020-12-16 RX ORDER — METFORMIN HCL 500 MG
500 TABLET, EXTENDED RELEASE 24 HR ORAL 2 TIMES DAILY WITH MEALS
Qty: 60 TABLET | Refills: 3 | Status: SHIPPED | OUTPATIENT
Start: 2020-12-16 | End: 2021-04-14

## 2020-12-16 NOTE — TELEPHONE ENCOUNTER
metFORMIN (GLUCOPHAGE-XR) 500 MG 24 hr tablet     Last Written Prescription Date:  8/20/20  Last Fill Quantity: 60,   # refills: 3  Last Office Visit: 8/31/20  Future Office visit:    Next 5 appointments (look out 90 days)    Mar 01, 2021  1:15 PM  (Arrive by 1:00 PM)  Office Visit with Ana Valdez CNP  [facility] ([facility] ) [address]  [facility] [address]  [phone]           Routing refill request to provider for review/approval

## 2021-01-18 DIAGNOSIS — I10 BENIGN ESSENTIAL HYPERTENSION: ICD-10-CM

## 2021-01-18 NOTE — TELEPHONE ENCOUNTER
amLODIPine (NORVASC) 10 MG tablet  Last Written Prescription Date:  8/31/20  Last Fill Quantity: 90,   # refills: 1  Last Office Visit: 8/31/20  Future Office visit:    Next 5 appointments (look out 90 days)    Mar 01, 2021  1:15 PM  (Arrive by 1:00 PM)  Office Visit with Ana Valdez CNP  [facility] ([facility] ) [address]  [facility] [address]  [phone]           Routing refill request to provider for review/approval

## 2021-01-19 RX ORDER — AMLODIPINE BESYLATE 10 MG/1
10 TABLET ORAL DAILY
Qty: 90 TABLET | Refills: 1 | Status: SHIPPED | OUTPATIENT
Start: 2021-01-19 | End: 2021-07-14

## 2021-02-17 NOTE — PROGRESS NOTES
Assessment & Plan        Type 2 diabetes mellitus without complication, without long-term current use of insulin (H)  - Hemoglobin A1c  - Comprehensive metabolic panel (BMP + Alb, Alk Phos, ALT, AST, Total. Bili, TP)  - UA reflex to Microscopic and Culture - [place] [facility]/[place]  - Albumin Random Urine Quantitative with Creat Ratio    Hyperlipidemia LDL goal <100  - Lipid Profile (Chol, Trig, HDL, LDL calc)  - Comprehensive metabolic panel (BMP + Alb, Alk Phos, ALT, AST, Total. Bili, TP)    Benign essential hypertension  - TSH with free T4 reflex  - Comprehensive metabolic panel (BMP + Alb, Alk Phos, ALT, AST, Total. Bili, TP)    Encounter for screening mammogram for malignant neoplasm of breast  - MA Screen Bilateral w/Abhinav; Future    Current mild episode of major depressive disorder without prior episode (H)  - citalopram (CELEXA) 10 MG tablet; Take 1 tablet (10 mg) by mouth daily      Return in about 6 months (around 9/1/2021) for Chronic disease management, am fasting.      Ana Valdez, RENNY  [facility] [place] SHUKRI Martin is a 70 year old who presents for the following health issues       Diabetes Follow-up    How often are you checking your blood sugar? Not at all    What concerns do you have today about your diabetes? None     Do you have any of these symptoms? (Select all that apply)  Numbness in feet    Have you had a diabetic eye exam in the last 12 months? No      Hyperlipidemia Follow-Up    Are you regularly taking any medication or supplement to lower your cholesterol?   Yes- simvastatin    Are you having muscle aches or other side effects that you think could be caused by your cholesterol lowering medication?  No      Hypertension Follow-up    Do you check your blood pressure regularly outside of the clinic? No     Are you following a low salt diet? No    Are your blood pressures ever more than 140 on the top number (systolic) OR more   than 90 on the bottom number (diastolic), for example  140/90? No      BP Readings from Last 2 Encounters:   03/01/21 112/70   09/16/20 120/70     Hemoglobin A1C (%)   Date Value   08/28/2020 5.9 (H)   01/03/2020 7.2 (H)     LDL Cholesterol Calculated (mg/dL)   Date Value   08/28/2020 85   01/03/2020 119 (H)         How many servings of fruits and vegetables do you eat daily?  2-3    On average, how many sweetened beverages do you drink each day (Examples: soda, juice, sweet tea, etc.  Do NOT count diet or artificially sweetened beverages)?   0    How many days per week do you exercise enough to make your heart beat faster? 3 or less    How many minutes a day do you exercise enough to make your heart beat faster? 30 - 60    How many days per week do you miss taking your medication? 0      Abnormal Mood Symptoms  Onset/Duration: over recent months  Description: low mood, chris easily, anxious  Depression (if yes, do PHQ-9): YES  Anxiety (if yes, do ORTEGA-7): YES  Accompanying Signs & Symptoms:  Still participating in activities that you used to enjoy: no  Fatigue: YES  Irritability: YES  Difficulty concentrating: no  Changes in appetite: no  Problems with sleep: no  Heart racing/beating fast: no  Abnormally elevated, expansive, or irritable mood: no  Persistently increased activity or energy: no  Thoughts of hurting yourself or others: no  History:  Recent stress or major life event: Covid restrictions  Prior depression or anxiety: None  Family history of depression or anxiety: no  Alcohol/drug use: no  Difficulty sleeping: no  Precipitating or alleviating factors: None  Therapies tried and outcome: none        PHQ 4/16/2019 1/3/2020 3/1/2021   PHQ-9 Total Score 0 0 16   Q9: Thoughts of better off dead/self-harm past 2 weeks Not at all Not at all Not at all     ORTEGA-7 SCORE 4/16/2019 1/3/2020 3/1/2021   Total Score 0 0 15         Patient Active Problem List   Diagnosis     Benign essential hypertension     Tobacco abuse     Hyperlipidemia LDL goal <100     Taking a statin  medication     Morbid obesity (H)     Calculus of kidney     Type 2 diabetes mellitus without complication, without long-term current use of insulin (H)     Current mild episode of major depressive disorder without prior episode (H)     Past Surgical History:   Procedure Laterality Date     ABDOMEN SURGERY      2 c-sections     CHOLECYSTECTOMY  1994     GYN SURGERY      total hyst,, no cervix     HC REMOVAL OF NAIL PLATE SIMPLE SINGLE  04/19/2018    removal of 1/4  left gt toenail, local      HEAD & NECK SURGERY      tumor neck, benign     ORTHOPEDIC SURGERY Bilateral     carpul tunnel        Social History     Tobacco Use     Smoking status: Heavy Tobacco Smoker     Packs/day: 1.00     Years: 40.00     Pack years: 40.00     Types: Cigarettes     Start date: 1/1/1972     Smokeless tobacco: Never Used     Tobacco comment: pt is slowly cutting down- 1 pack a day    Substance Use Topics     Alcohol use: No     Family History   Problem Relation Age of Onset     Other Cancer Mother      Other Cancer Brother      Breast Cancer Paternal Aunt            Current Outpatient Medications   Medication Sig Dispense Refill     amLODIPine (NORVASC) 10 MG tablet TAKE 1 TABLET (10 MG) BY MOUTH DAILY 90 tablet 1     aspirin 81 MG EC tablet Take 1 tablet (81 mg) by mouth daily 90 tablet 3     capsaicin (ZOSTRIX) 0.025 % external cream Apply 1 g topically 3 times daily 120 g 3     cholecalciferol (VITAMIN D3) 5000 units TABS tablet Take 1 tablet (5,000 Units) by mouth daily 90 tablet 11     citalopram (CELEXA) 10 MG tablet Take 1 tablet (10 mg) by mouth daily 90 tablet 1     losartan (COZAAR) 100 MG tablet TAKE 1 TABLET (100 MG) BY MOUTH DAILY FOR BLOOD PRESSURE 90 tablet 1     metFORMIN (GLUCOPHAGE-XR) 500 MG 24 hr tablet TAKE 1 TABLET (500 MG) BY MOUTH 2 TIMES DAILY (WITH MEALS) 60 tablet 3     metoprolol tartrate (LOPRESSOR) 50 MG tablet TAKE 1 TABLET BY MOUTH TWICE A DAY FOR HEART AND BLOOD PRESSURE. 180 tablet 1     Multiple  "Vitamins-Iron (DAILY MULTIPLE VITAMIN/IRON) TABS Take 1 tablet by mouth daily 90 tablet 11     omega 3 1000 MG CAPS 3 po daily (Patient taking differently: 1 capsule daily) 90 capsule 11     simvastatin (ZOCOR) 40 MG tablet TAKE 1 TABLET (40MG) BY MOUTH ONCE DAILY. 90 tablet 1     nystatin (MYCOSTATIN) 630993 UNIT/GM external cream Apply topically 2 times daily (Patient not taking: Reported on 3/1/2021) 60 g 1       No Known Allergies       Recent Labs   Lab Test 08/28/20  1330 01/03/20  1454 12/16/19  1628 07/24/19  1323   A1C 5.9* 7.2*  --   --    LDL 85 119*  --  99   HDL 42* 40*  --  38*   TRIG 150* 204*  --  158*   ALT 25 50 45 58*   CR 0.72 0.86 1.02 0.88   GFRESTIMATED 86 69 56* 67   GFRESTBLACK >90 80 65 78   POTASSIUM 4.1 4.0 3.5 4.1   TSH 2.86  --   --  2.78        BP Readings from Last 3 Encounters:   03/01/21 112/70   09/16/20 120/70   08/31/20 122/78    Wt Readings from Last 3 Encounters:   03/01/21 78.9 kg (174 lb)   09/16/20 80.3 kg (177 lb)   08/31/20 80 kg (176 lb 6.4 oz)               Review of Systems   Constitutional, HEENT, cardiovascular, pulmonary, GI, , musculoskeletal, neuro, skin, endocrine and psych systems are negative, except as otherwise noted.        Objective    /70 (BP Location: Right arm, Patient Position: Chair, Cuff Size: Adult Regular)   Pulse 61   Temp 98.4  F (36.9  C) (Tympanic)   Ht 1.549 m (5' 1\")   Wt 78.9 kg (174 lb)   SpO2 97%   BMI 32.88 kg/m    Body mass index is 32.88 kg/m .       Physical Exam   GENERAL: healthy, alert and no distress  EYES: Eyes grossly normal to inspection, PERRL and conjunctivae and sclerae normal  NECK: no adenopathy, no asymmetry, masses, or scars and thyroid normal to palpation  RESP: lungs clear to auscultation - no rales, rhonchi or wheezes  CV: regular rate and rhythm, normal S1 S2, no S3 or S4, no murmur, click or rub, no peripheral edema and peripheral pulses strong  MS: no gross musculoskeletal defects noted, no edema  SKIN: " no suspicious lesions or rashes  PSYCH: affect flat and anxious          Lung Cancer Screening Shared Decision Making Visit     Aga Gary is eligible for lung cancer screening on the basis of the information provided in my signed lung cancer screening order.     I have discussed with patient the risks and benefits of screening for lung cancer with low-dose CT.     The risks include:  radiation exposure: one low dose chest CT has as much ionizing radiation as about 15 chest x-rays or 6 months of background radiation living in [place]    false positives: 96% of positive findings/nodules are NOT cancer, but some might still require additional diagnostic evaluation, including biopsy  over-diagnosis: some slow growing cancers that might never have been clinically significant will be detected and treated unnecessarily     The benefit of early detection of lung cancer is contingent upon adherence to annual screening or more frequent follow up if indicated.     Furthermore, reaping the benefits of screening requires Aga Gary to be willing and physically able to undergo diagnostic procedures, if indicated. Although no specific guide is available for determining severity of comorbidities, it is reasonable to withhold screening in patients who have greater mortality risk from other diseases.     We did discuss that the only way to prevent lung cancer is to not smoke. Smoking cessation counseling was given, duration < 3 minutes.      I did offer risk estimation using a calculator such as this one:    ShouldIScreen

## 2021-03-01 ENCOUNTER — OFFICE VISIT (OUTPATIENT)
Dept: FAMILY MEDICINE | Facility: OTHER | Age: 71
End: 2021-03-01
Attending: NURSE PRACTITIONER
Payer: COMMERCIAL

## 2021-03-01 VITALS
HEIGHT: 61 IN | SYSTOLIC BLOOD PRESSURE: 112 MMHG | DIASTOLIC BLOOD PRESSURE: 70 MMHG | HEART RATE: 61 BPM | BODY MASS INDEX: 32.85 KG/M2 | WEIGHT: 174 LBS | OXYGEN SATURATION: 97 % | TEMPERATURE: 98.4 F

## 2021-03-01 DIAGNOSIS — I10 BENIGN ESSENTIAL HYPERTENSION: ICD-10-CM

## 2021-03-01 DIAGNOSIS — F32.0 CURRENT MILD EPISODE OF MAJOR DEPRESSIVE DISORDER WITHOUT PRIOR EPISODE (H): ICD-10-CM

## 2021-03-01 DIAGNOSIS — Z87.891 PERSONAL HISTORY OF TOBACCO USE: ICD-10-CM

## 2021-03-01 DIAGNOSIS — E78.5 HYPERLIPIDEMIA LDL GOAL <100: ICD-10-CM

## 2021-03-01 DIAGNOSIS — E11.9 TYPE 2 DIABETES MELLITUS WITHOUT COMPLICATION, WITHOUT LONG-TERM CURRENT USE OF INSULIN (H): Primary | ICD-10-CM

## 2021-03-01 DIAGNOSIS — Z12.31 ENCOUNTER FOR SCREENING MAMMOGRAM FOR MALIGNANT NEOPLASM OF BREAST: ICD-10-CM

## 2021-03-01 LAB
ALBUMIN SERPL-MCNC: 3.8 G/DL (ref 3.4–5)
ALBUMIN UR-MCNC: NEGATIVE MG/DL
ALP SERPL-CCNC: 70 U/L (ref 40–150)
ALT SERPL W P-5'-P-CCNC: 27 U/L (ref 0–50)
ANION GAP SERPL CALCULATED.3IONS-SCNC: 6 MMOL/L (ref 3–14)
APPEARANCE UR: CLEAR
AST SERPL W P-5'-P-CCNC: 22 U/L (ref 0–45)
BILIRUB SERPL-MCNC: 0.8 MG/DL (ref 0.2–1.3)
BILIRUB UR QL STRIP: NEGATIVE
BUN SERPL-MCNC: 17 MG/DL (ref 7–30)
CALCIUM SERPL-MCNC: 9.2 MG/DL (ref 8.5–10.1)
CHLORIDE SERPL-SCNC: 108 MMOL/L (ref 94–109)
CHOLEST SERPL-MCNC: 157 MG/DL
CO2 SERPL-SCNC: 23 MMOL/L (ref 20–32)
COLOR UR AUTO: YELLOW
CREAT SERPL-MCNC: 0.78 MG/DL (ref 0.52–1.04)
CREAT UR-MCNC: 24 MG/DL
EST. AVERAGE GLUCOSE BLD GHB EST-MCNC: 114 MG/DL
GFR SERPL CREATININE-BSD FRML MDRD: 78 ML/MIN/{1.73_M2}
GLUCOSE SERPL-MCNC: 123 MG/DL (ref 70–99)
GLUCOSE UR STRIP-MCNC: NEGATIVE MG/DL
HBA1C MFR BLD: 5.6 % (ref 0–5.6)
HDLC SERPL-MCNC: 49 MG/DL
HGB UR QL STRIP: NEGATIVE
KETONES UR STRIP-MCNC: NEGATIVE MG/DL
LDLC SERPL CALC-MCNC: 82 MG/DL
LEUKOCYTE ESTERASE UR QL STRIP: NEGATIVE
MICROALBUMIN UR-MCNC: <5 MG/L
MICROALBUMIN/CREAT UR: NORMAL MG/G CR (ref 0–25)
NITRATE UR QL: NEGATIVE
NONHDLC SERPL-MCNC: 108 MG/DL
PH UR STRIP: 6.5 PH (ref 5–7)
POTASSIUM SERPL-SCNC: 4.1 MMOL/L (ref 3.4–5.3)
PROT SERPL-MCNC: 8.3 G/DL (ref 6.8–8.8)
SODIUM SERPL-SCNC: 137 MMOL/L (ref 133–144)
SOURCE: NORMAL
SP GR UR STRIP: <=1.005 (ref 1–1.03)
TRIGL SERPL-MCNC: 130 MG/DL
TSH SERPL DL<=0.005 MIU/L-ACNC: 4 MU/L (ref 0.4–4)
UROBILINOGEN UR STRIP-ACNC: 0.2 EU/DL (ref 0.2–1)

## 2021-03-01 PROCEDURE — 83036 HEMOGLOBIN GLYCOSYLATED A1C: CPT | Mod: ZL | Performed by: NURSE PRACTITIONER

## 2021-03-01 PROCEDURE — 81003 URINALYSIS AUTO W/O SCOPE: CPT | Mod: ZL | Performed by: NURSE PRACTITIONER

## 2021-03-01 PROCEDURE — 80061 LIPID PANEL: CPT | Mod: ZL | Performed by: NURSE PRACTITIONER

## 2021-03-01 PROCEDURE — 999N001182 HC STATISTIC ESTIMATED AVERAGE GLUCOSE: Mod: ZL | Performed by: NURSE PRACTITIONER

## 2021-03-01 PROCEDURE — 82043 UR ALBUMIN QUANTITATIVE: CPT | Mod: ZL | Performed by: NURSE PRACTITIONER

## 2021-03-01 PROCEDURE — G0463 HOSPITAL OUTPT CLINIC VISIT: HCPCS

## 2021-03-01 PROCEDURE — 36415 COLL VENOUS BLD VENIPUNCTURE: CPT | Mod: ZL | Performed by: NURSE PRACTITIONER

## 2021-03-01 PROCEDURE — G0296 VISIT TO DETERM LDCT ELIG: HCPCS | Performed by: NURSE PRACTITIONER

## 2021-03-01 PROCEDURE — G0463 HOSPITAL OUTPT CLINIC VISIT: HCPCS | Mod: 25

## 2021-03-01 PROCEDURE — 84443 ASSAY THYROID STIM HORMONE: CPT | Mod: ZL | Performed by: NURSE PRACTITIONER

## 2021-03-01 PROCEDURE — 99214 OFFICE O/P EST MOD 30 MIN: CPT | Performed by: NURSE PRACTITIONER

## 2021-03-01 PROCEDURE — 80053 COMPREHEN METABOLIC PANEL: CPT | Mod: ZL | Performed by: NURSE PRACTITIONER

## 2021-03-01 RX ORDER — CITALOPRAM HYDROBROMIDE 10 MG/1
10 TABLET ORAL DAILY
Qty: 90 TABLET | Refills: 1 | Status: SHIPPED | OUTPATIENT
Start: 2021-03-01 | End: 2021-06-12

## 2021-03-01 ASSESSMENT — ANXIETY QUESTIONNAIRES
IF YOU CHECKED OFF ANY PROBLEMS ON THIS QUESTIONNAIRE, HOW DIFFICULT HAVE THESE PROBLEMS MADE IT FOR YOU TO DO YOUR WORK, TAKE CARE OF THINGS AT HOME, OR GET ALONG WITH OTHER PEOPLE: NOT DIFFICULT AT ALL
2. NOT BEING ABLE TO STOP OR CONTROL WORRYING: SEVERAL DAYS
1. FEELING NERVOUS, ANXIOUS, OR ON EDGE: MORE THAN HALF THE DAYS
4. TROUBLE RELAXING: NEARLY EVERY DAY
3. WORRYING TOO MUCH ABOUT DIFFERENT THINGS: NEARLY EVERY DAY
5. BEING SO RESTLESS THAT IT IS HARD TO SIT STILL: NEARLY EVERY DAY
6. BECOMING EASILY ANNOYED OR IRRITABLE: NEARLY EVERY DAY
7. FEELING AFRAID AS IF SOMETHING AWFUL MIGHT HAPPEN: NOT AT ALL
GAD7 TOTAL SCORE: 15

## 2021-03-01 ASSESSMENT — PATIENT HEALTH QUESTIONNAIRE - PHQ9: SUM OF ALL RESPONSES TO PHQ QUESTIONS 1-9: 16

## 2021-03-01 ASSESSMENT — MIFFLIN-ST. JEOR: SCORE: 1251.64

## 2021-03-01 ASSESSMENT — PAIN SCALES - GENERAL: PAINLEVEL: MILD PAIN (2)

## 2021-03-01 NOTE — NURSING NOTE
"Chief Complaint   Patient presents with     Chronic Disease Management       Initial /70 (BP Location: Right arm, Patient Position: Chair, Cuff Size: Adult Regular)   Pulse 61   Temp 98.4  F (36.9  C) (Tympanic)   Ht 1.549 m (5' 1\")   Wt 78.9 kg (174 lb)   SpO2 97%   BMI 32.88 kg/m   Estimated body mass index is 32.88 kg/m  as calculated from the following:    Height as of this encounter: 1.549 m (5' 1\").    Weight as of this encounter: 78.9 kg (174 lb).  Medication Reconciliation: complete  Diamond Pham LPN    "

## 2021-03-01 NOTE — PATIENT INSTRUCTIONS
Assessment & Plan       Type 2 diabetes mellitus without complication, without long-term current use of insulin (H)  - Hemoglobin A1c  - Comprehensive metabolic panel (BMP + Alb, Alk Phos, ALT, AST, Total. Bili, TP)  - UA reflex to Microscopic and Culture - [place] [facility]  - Albumin Random Urine Quantitative with Creat Ratio    Hyperlipidemia LDL goal <100  - Lipid Profile (Chol, Trig, HDL, LDL calc)  - Comprehensive metabolic panel (BMP + Alb, Alk Phos, ALT, AST, Total. Bili, TP)    Benign essential hypertension  - TSH with free T4 reflex  - Comprehensive metabolic panel (BMP + Alb, Alk Phos, ALT, AST, Total. Bili, TP)    Encounter for screening mammogram for malignant neoplasm of breast  - MA Screen Bilateral w/Abhinav; Future    Current mild episode of major depressive disorder without prior episode (H)  - citalopram (CELEXA) 10 MG tablet; Take 1 tablet (10 mg) by mouth daily      Please update me in 1 month with your status to see if the Celexa is helping.      Nelly - [phone]      Return in about 6 months (around 9/1/2021) for Chronic disease management, am fasting.    Ana Valdez, RENNY  [facility]        Lung Cancer Screening   Frequently Asked Questions  If you are at high-risk for lung cancer, getting screened with low-dose computed tomography (LDCT) every year can help save your life. This handout offers answers to some of the most common questions about lung cancer screening. If you have other questions, please call [phone] ([phone]).     What is it?  Lung cancer screening uses special X-ray technology to create an image of your lung tissue. The exam is quick and easy and takes less than 10 seconds. We don t give you any medicine or use any needles. You can eat before and after the exam. You don t need to change your clothes as long as the clothing on your chest doesn t contain metal. But, you do need to be able to hold your breath for at least 6 seconds during  the exam.    What is the goal of lung cancer screening?  The goal of lung cancer screening is to save lives. Many times, lung cancer is not found until a person starts having physical symptoms. Lung cancer screening can help detect lung cancer in the earliest stages when it may be easier to treat.    Who should be screened for lung cancer?  We suggest lung cancer screening for anyone who is at high-risk for lung cancer. You are in the high-risk group if you:      are between the ages of 55 and 79, and    have smoked at least 1 pack of cigarettes a day for 30 or more years, and    still smoke or have quit within the past 15 years.    However, if you have a new cough or shortness of breath, you should talk to your doctor before being screened.    Some national lung health advocacy groups also recommend screening for people ages 50 to 79 who have smoked an average of 1 pack of cigarettes a day for 20 years. They must also have at least 1 other risk factor for lung cancer, not including exposure to secondhand smoke. Other risk factors are having had cancer in the past, emphysema, pulmonary fibrosis, COPD, a family history of lung cancer, or exposure to certain materials such as arsenic, asbestos, beryllium, cadmium, chromium, diesel fumes, nickel, radon or silica. Your care team can help you know if you have one of these risk factors.     Why does it matter if I have symptoms?  Certain symptoms can be a sign that you have a condition in your lungs that should be checked and treated by your doctor. These symptoms include fever, chest pain, a new or changing cough, shortness of breath that you have never felt before, coughing up blood or unexplained weight loss. Having any of these symptoms can greatly affect the results of lung cancer screening.       Should all smokers get an LDCT lung cancer screening exam?  It depends. Lung cancer screening is for a very specific group of men and women who have a history of heavy  smoking over a long period of time (see  Who should be screened for lung cancer  above).  I am in the high-risk group, but have been diagnosed with cancer in the past. Is LDCT lung cancer screening right for me?  In some cases, you should not have LDCT lung screening, such as when your doctor is already following your cancer with CT scan studies. Your doctor will help you decide if LDCT lung screening is right for you.  Do I need to have a screening exam every year?  Yes. If you are in the high-risk group described earlier, you should get an LDCT lung cancer screening exam every year until you are 79, or are no longer willing or able to undergo screening and possible procedures to diagnose and treat lung cancer.  How effective is LDCT at preventing death from lung cancer?  Studies have shown that LDCT lung cancer screening can lower the risk of death from lung cancer by 20 percent in people who are at high-risk.  What are the risks?  There are some risks and limitations of LDCT lung cancer screening. We want to make sure you understand the risks and benefits, so please let us know if you have any questions. Your doctor may want to talk with you more about these risks.    Radiation exposure: As with any exam that uses radiation, there is a very small increased risk of cancer. The amount of radiation in LDCT is small--about the same amount a person would get from a mammogram. Your doctor orders the exam when he or she feels the potential benefits outweigh the risks.    False negatives: No test is perfect, including LDCT. It is possible that you may have a medical condition, including lung cancer, that is not found during your exam. This is called a false negative result.    False positives and more testing: LDCT very often finds something in the lung that could be cancer, but in fact is not. This is called a false positive result. False positive tests often cause anxiety. To make sure these findings are not cancer,  you may need to have more tests. These tests will be done only if you give us permission. Sometimes patients need a treatment that can have side effects, such as a biopsy. For more information on false positives, see  What can I expect from the results?     Findings not related to lung cancer: Your LDCT exam also takes pictures of areas of your body next to your lungs. In a very small number of cases, the CT scan will show an abnormal finding in one of these areas, such as your kidneys, adrenal glands, liver or thyroid. This finding may not be serious, but you may need more tests. Your doctor can help you decide what other tests you may need, if any.  What can I expect from the results?  About 1 out of 4 LDCT exams will find something that may need more tests. Most of the time, these findings are lung nodules. Lung nodules are very small collections of tissue in the lung. These nodules are very common, and the vast majority--more than 97 percent--are not cancer (benign). Most are normal lymph nodes or small areas of scarring from past infections.  But, if a small lung nodule is found to be cancer, the cancer can be cured more than 90 percent of the time. To know if the nodule is cancer, we may need to get more images before your next yearly screening exam. If the nodule has suspicious features (for example, it is large, has an odd shape or grows over time), we will refer you to a specialist for further testing.  Will my doctor also get the results?  Yes. Your doctor will get a copy of your results.  Is it okay to keep smoking now that there s a cancer screening exam?  No. Tobacco is one of the strongest cancer-causing agents. It causes not only lung cancer, but other cancers and cardiovascular (heart) diseases as well. The damage caused by smoking builds over time. This means that the longer you smoke, the higher your risk of disease. While it is never too late to quit, the sooner you quit, the better.  Where can I  find help to quit smoking?  The best way to prevent lung cancer is to stop smoking. If you have already quit smoking, congratulations and keep it up! For help on quitting smoking, please call [employer] at 7-962-058-ANFH (8579) or the American Cancer Society at 1-204.338.3975 to find local resources near you.  One-on-one health coaching:  If you d prefer to work individually with a health care provider on tobacco cessation, we offer:      Medication Therapy Management:  Our specially trained pharmacists work closely with you and your doctor to help you quit smoking.  Call [phone] or [phone] (toll free).     Can Do: Health coaching offered by [facility].  [email or web address]

## 2021-03-01 NOTE — LETTER
My Depression Action Plan  Name: Aga Gary   Date of Birth 1950  Date: 3/1/2021    My doctor: Ana Valdez   My clinic: [facility]  [address]  [place] [address]  [phone]          GREEN    ZONE   Good Control    What it looks like:     Things are going generally well. You have normal ups and downs. You may even feel depressed from time to time, but bad moods usually last less than a day.   What you need to do:  1. Continue to care for yourself (see self care plan)  2. Check your depression survival kit and update it as needed  3. Follow your physician s recommendations including any medication.  4. Do not stop taking medication unless you consult with your physician first.           YELLOW         ZONE Getting Worse    What it looks like:     Depression is starting to interfere with your life.     It may be hard to get out of bed; you may be starting to isolate yourself from others.    Symptoms of depression are starting to last most all day and this has happened for several days.     You may have suicidal thoughts but they are not constant.   What you need to do:     1. Call your care team. Your response to treatment will improve if you keep your care team informed of your progress. Yellow periods are signs an adjustment may need to be made.     2. Continue your self-care.  Just get dressed and ready for the day.  Don't give yourself time to talk yourself out of it.    3. Talk to someone in your support network.    4. Open up your Depression Self-Care Plan/Wellness Kit.           RED    ZONE Medical Alert - Get Help    What it looks like:     Depression is seriously interfering with your life.     You may experience these or other symptoms: You can t get out of bed most days, can t work or engage in other necessary activities, you have trouble taking care of basic hygiene, or basic responsibilities, thoughts of suicide or death that will not  go away, self-injurious behavior.     What you need to do:  1. Call your care team and request a same-day appointment. If they are not available (weekends or after hours) call your local crisis line, emergency room or 911.          Depression Self-Care Plan / Wellness Kit    Many people find that medication and therapy are helpful treatments for managing depression. In addition, making small changes to your everyday life can help to boost your mood and improve your wellbeing. Below are some tips for you to consider. Be sure to talk with your medical provider and/or behavioral health consultant if your symptoms are worsening or not improving.     Sleep   Sleep hygiene  means all of the habits that support good, restful sleep. It includes maintaining a consistent bedtime and wake time, using your bedroom only for sleeping or sex, and keeping the bedroom dark and free of distractions like a computer, smartphone, or television.     Develop a Healthy Routine  Maintain good hygiene. Get out of bed in the morning, make your bed, brush your teeth, take a shower, and get dressed. Don t spend too much time viewing media that makes you feel stressed. Find time to relax each day.    Exercise  Get some form of exercise every day. This will help reduce pain and release endorphins, the  feel good  chemicals in your brain. It can be as simple as just going for a walk or doing some gardening, anything that will get you moving.      Diet  Strive to eat healthy foods, including fruits and vegetables. Drink plenty of water. Avoid excessive sugar, caffeine, alcohol, and other mood-altering substances.     Stay Connected with Others  Stay in touch with friends and family members.    Manage Your Mood  Try deep breathing, massage therapy, biofeedback, or meditation. Take part in fun activities when you can. Try to find something to smile about each day.     Psychotherapy  Be open to working with a therapist if your provider recommends it.      Medication  Be sure to take your medication as prescribed. Most anti-depressants need to be taken every day. It usually takes several weeks for medications to work. Not all medicines work for all people. It is important to follow-up with your provider to make sure you have a treatment plan that is working for you. Do not stop your medication abruptly without first discussing it with your provider.    Crisis Resources   These hotlines are for both adults and children. They and are open 24 hours a day, 7 days a week unless noted otherwise.      National Suicide Prevention Lifeline   5-872-195-TALK (4629)      Crisis Text Line    www.crisistextline.org  Text HOME to 371840 from anywhere in the [place], anytime, about any type of crisis. A live, trained crisis counselor will receive the text and respond quickly.      [place] Lifeline for LGBTQ Youth  A national crisis intervention and suicide lifeline for LGBTQ youth under 25. Provides a safe place to talk without judgement. Call 1-137.176.8362; text START to 652000 or visit www.thetrevorproject.org to talk to a trained counselor.      For [facility] crisis numbers, visit the [facility] website at:  https://[place].gov/dhs/people-we-serve/adults/health-care/mental-health/resources/crisis-contacts.jsp

## 2021-03-02 ASSESSMENT — ANXIETY QUESTIONNAIRES: GAD7 TOTAL SCORE: 15

## 2021-03-19 ENCOUNTER — ANCILLARY PROCEDURE (OUTPATIENT)
Dept: MAMMOGRAPHY | Facility: OTHER | Age: 71
End: 2021-03-19
Attending: NURSE PRACTITIONER
Payer: COMMERCIAL

## 2021-03-19 ENCOUNTER — HOSPITAL ENCOUNTER (OUTPATIENT)
Dept: CT IMAGING | Facility: HOSPITAL | Age: 71
End: 2021-03-19
Attending: NURSE PRACTITIONER
Payer: COMMERCIAL

## 2021-03-19 DIAGNOSIS — Z12.31 ENCOUNTER FOR SCREENING MAMMOGRAM FOR MALIGNANT NEOPLASM OF BREAST: ICD-10-CM

## 2021-03-19 DIAGNOSIS — Z87.891 PERSONAL HISTORY OF TOBACCO USE: ICD-10-CM

## 2021-03-19 PROCEDURE — 77063 BREAST TOMOSYNTHESIS BI: CPT | Mod: TC

## 2021-03-19 PROCEDURE — 71271 CT THORAX LUNG CANCER SCR C-: CPT

## 2021-03-22 DIAGNOSIS — E04.1 THYROID NODULE: ICD-10-CM

## 2021-03-22 DIAGNOSIS — J84.10 PULMONARY FIBROSIS (H): Primary | ICD-10-CM

## 2021-03-22 NOTE — RESULT ENCOUNTER NOTE
Benign appearance of pulm nodules  Fibrotic changes on CT  I entered a referral to pulmonary medicine to make sure there isnt something else I should be doing.    Possible thyroid nodule noted on chest CT  Thyroid US recommended, ordered    Ana PLEITEZ-FNP  [phone]

## 2021-03-23 ENCOUNTER — HOSPITAL ENCOUNTER (OUTPATIENT)
Dept: ULTRASOUND IMAGING | Facility: HOSPITAL | Age: 71
Discharge: HOME | End: 2021-03-23
Attending: NURSE PRACTITIONER | Admitting: NURSE PRACTITIONER
Payer: COMMERCIAL

## 2021-03-23 DIAGNOSIS — E04.1 THYROID NODULE: ICD-10-CM

## 2021-03-23 PROCEDURE — 76536 US EXAM OF HEAD AND NECK: CPT

## 2021-03-24 DIAGNOSIS — E04.1 THYROID NODULE: Primary | ICD-10-CM

## 2021-04-01 ENCOUNTER — TELEPHONE (OUTPATIENT)
Dept: FAMILY MEDICINE | Facility: OTHER | Age: 71
End: 2021-04-01

## 2021-04-01 DIAGNOSIS — J84.10 PULMONARY FIBROSIS (H): Primary | ICD-10-CM

## 2021-04-01 NOTE — TELEPHONE ENCOUNTER
Patient was contacted by [facility] regarding the Pulmonary Referral and patient declined as she does not want to doctor through [facility] - Please enter new referral for [facility].

## 2021-04-14 DIAGNOSIS — E11.9 TYPE 2 DIABETES MELLITUS WITHOUT COMPLICATION, WITHOUT LONG-TERM CURRENT USE OF INSULIN (H): ICD-10-CM

## 2021-04-14 RX ORDER — METFORMIN HCL 500 MG
500 TABLET, EXTENDED RELEASE 24 HR ORAL 2 TIMES DAILY WITH MEALS
Qty: 60 TABLET | Refills: 3 | Status: SHIPPED | OUTPATIENT
Start: 2021-04-14 | End: 2021-08-12

## 2021-04-14 NOTE — TELEPHONE ENCOUNTER
metFORMIN (GLUCOPHAGE-XR) 500mg      Last Written Prescription Date:  12/16/2020  Last Fill Quantity: 60,   # refills: 3  Last Office Visit: 3/1/2021  Future Office visit:       Routing refill request to provider for review/approval because:

## 2021-04-16 ENCOUNTER — TRANSFERRED RECORDS (OUTPATIENT)
Dept: HEALTH INFORMATION MANAGEMENT | Facility: CLINIC | Age: 71
End: 2021-04-16

## 2021-04-19 NOTE — PROGRESS NOTES
Otolaryngology Consultation    Patient: Aga Gary  : 1950    Patient presents with:  Consult: Thyroid Nodule; Referred by Ana Valdez      HPI:  Aga Gary (DAR) is a 70 year old female seen today at the request of Ana Valdez  for evaluation of a thyroid nodule.    This nodule was found incidentally on CT chest  Distant left thyroid lobectomy in which she describes nodule removal from the left lobe.   No post operative dysphonia and no difficulty with bleeding or anesthesia.    The patient denies dysphonia, dysphagia or any other compressive symptoms.  There is no family history of thyroid cancer, and no personal history of head or neck irradiation.    The thyroid ultrasound was reviewed dated 3/23/2021  The dominant nodule(s) measures Right 3.2 cm nodule cystic and solid  Status post partial left thyroid lobectomy.  The remaining left lobe is without nodule.    Thyroid labs were reviewed.  TSH normal on 3/1/21 at 4.00  Calcium 9.2 on 3/1/21  The patient denies tremor or weight loss. She has noted hair loss    40 pack yr hx tobacco, current smoker    US 3/23/21  R lobe 5.9 x 2.4 x 2.9 cm.  There is a 3.2 cm diameter mass with both cystic and solid components.  The left lobe of the thyroid measures 1.8 x 0.5 x 0.6 cm. Portion of  the left lobe is been removed.    Here with daughter Swapna    Current Outpatient Rx   Medication Sig Dispense Refill     amLODIPine (NORVASC) 10 MG tablet TAKE 1 TABLET (10 MG) BY MOUTH DAILY 90 tablet 1     aspirin 81 MG EC tablet Take 1 tablet (81 mg) by mouth daily 90 tablet 3     capsaicin (ZOSTRIX) 0.025 % external cream Apply 1 g topically 3 times daily 120 g 3     cholecalciferol (VITAMIN D3) 5000 units TABS tablet Take 1 tablet (5,000 Units) by mouth daily 90 tablet 11     citalopram (CELEXA) 10 MG tablet Take 1 tablet (10 mg) by mouth daily 90 tablet 1     losartan (COZAAR) 100 MG tablet TAKE 1 TABLET (100 MG) BY MOUTH DAILY FOR BLOOD PRESSURE 90 tablet 1      metFORMIN (GLUCOPHAGE-XR) 500 MG 24 hr tablet TAKE 1 TABLET (500 MG) BY MOUTH 2 TIMES DAILY (WITH MEALS) 60 tablet 3     metoprolol tartrate (LOPRESSOR) 50 MG tablet TAKE 1 TABLET BY MOUTH TWICE A DAY FOR HEART AND BLOOD PRESSURE. 180 tablet 1     Multiple Vitamins-Iron (DAILY MULTIPLE VITAMIN/IRON) TABS Take 1 tablet by mouth daily 90 tablet 11     nystatin (MYCOSTATIN) 323707 UNIT/GM external cream Apply topically 2 times daily (Patient not taking: Reported on 3/1/2021) 60 g 1     omega 3 1000 MG CAPS 3 po daily (Patient taking differently: 1 capsule daily) 90 capsule 11     simvastatin (ZOCOR) 40 MG tablet TAKE 1 TABLET (40MG) BY MOUTH ONCE DAILY. 90 tablet 1       Allergies: Patient has no known allergies.     Past Medical History:   Diagnosis Date     Benign essential hypertension 4/13/2018     Calculus of kidney 1/3/2020     Current mild episode of major depressive disorder without prior episode (H) 3/1/2021     Hyperlipidemia LDL goal <100 4/13/2018     Taking a statin medication 4/13/2018     Tobacco abuse 4/13/2018     Type 2 diabetes mellitus without complication, without long-term current use of insulin (H) 1/14/2020       Past Surgical History:   Procedure Laterality Date     ABDOMEN SURGERY      2 c-sections     CHOLECYSTECTOMY  1994     GYN SURGERY      total hyst,, no cervix     HC REMOVAL OF NAIL PLATE SIMPLE SINGLE  04/19/2018    removal of 1/4  left gt toenail, local      HEAD & NECK SURGERY      tumor neck, benign     ORTHOPEDIC SURGERY Bilateral     carpul tunnel        ENT family history reviewed    Social History     Tobacco Use     Smoking status: Heavy Tobacco Smoker     Packs/day: 1.00     Years: 40.00     Pack years: 40.00     Types: Cigarettes     Start date: 1/1/1972     Smokeless tobacco: Never Used     Tobacco comment: pt is slowly cutting down- 1 pack a day    Substance Use Topics     Alcohol use: No     Drug use: No       Review of Systems  ROS: 10 point ROS neg other than the  "symptoms noted above in the HPI and hair loss cold intolerance    Physical Exam  /62 (BP Location: Right arm, Cuff Size: Adult Regular)   Pulse 55   Temp 97.3  F (36.3  C)   Resp 16   Ht 1.549 m (5' 1\")   Wt 78.9 kg (174 lb)   SpO2 95%   BMI 32.88 kg/m    General - The patient is well nourished and well developed, and appears to have good nutritional status.  Alert and oriented to person and place, answers questions and cooperates with examination appropriately.   Head and Face - Normocephalic and atraumatic, with no gross asymmetry noted.  The facial nerve is intact, with strong symmetric movements.  Voice and Breathing - The patient was breathing comfortably without the use of accessory muscles. There was no wheezing, stridor, or stertor.  The patients voice was clear and strong, and had appropriate pitch and quality.  Ears -The external auditory canals are patent, the tympanic membranes are intact without effusion, retraction or mass.  Bony landmarks are intact.  Eyes - Extraocular movements intact, and the pupils were reactive to light.  Sclera were not icteric or injected, conjunctiva were pink and moist.  Mouth - Examination of the oral cavity showed pink, healthy oral mucosa. No lesions or ulcerations noted.  The tongue was mobile and midline, and the lower dentition were in scattered condition.  Upper edentulous, no palate mass  Throat - The walls of the oropharynx were smooth, pink, moist, symmetric, and had no lesions or ulcerations.  The tonsillar pillars and soft palate were symmetric.  The uvula was midline on elevation.    Neck - Normal midline excursion of the laryngotracheal complex during swallowing.  Full range of motion on passive movement.  Palpation of the occipital, submental, submandibular, internal jugular chain, and supraclavicular nodes did not demonstrate any abnormal lymph nodes or masses.  Palpation of the thyroid was irregular with a firm palpable RIGHT lobe nodule " measuring 3 cm.   No palpable left nodule or goiter   the trachea was mobile and midline.  Nose - External contour is symmetric, no gross deflection or scars.  Nasal mucosa is pink and moist with no abnormal mucus.  The septum was intact, turbinates of normal size and position.  No polyps, masses, or purulence noted on examination.    Attempts at mirror laryngoscopy were not possible due to gag reflex.  Therefore I proceeded with a fiberoptic examination after informed consent.  First I sprayed both sides of the nose with a mixture of lidocaine and neosynephrine.  I then passed the scope through the nasal cavity.  The nasal cavity was unremarkable.  The nasopharynx was mucosally covered and symmetric.  The eustachian tube openings were unobstructed.  Going further, the base of tongue was free of lesions, and the vallecula was open.  The epiglottis was smooth and mucosally covered.  The supraglottic larynx was then clearly visualized.  The vocal cords moved smoothly and symmetrically.  The pyriform sinuses were open and without bernice mass or pooling of secretions, and the limited view of the postcricoid region did not show any lesions.  The patient tolerated the procedure well.      Impression and Plan- Aga (KARLIE) SHREYAS Gary is a 70 year old female with:    ICD-10-CM    1. History of partial left thyroid lobectomy  Z98.890     1970s   2. RIGHT Thyroid nodule  E04.1 US Biopsy Thyroid Fine Needle Aspiration   3. Tobacco abuse counseling  Z71.6          The remainder of today's visit was spent discussing the options in this situation.  Also, I educated the patient about how common thyroid nodules are, and the fact that there is actually a low probability of this being a malignancy.  We also discussed the importance of follow up due to the possibility of false negatives with testing.      The next step is obtaining cellular material to decide whether or not surgery may be necessary, or clinical surveillance with repeat  ultrasounds and clinical follow up.   US guided  fine needle aspiration is pending.    The possibility of inadequate cellular material or non-diagnostic results, and the possibility of repeat fine needle aspiration was discussed with the patient    FNAB Right 3.2 cm nodule cystic and solid pending  Addendum:    Right nodule fine-needle aspiration showed AUS.  Recommend referral to endocrine either [facility] or [facility], if surgery is recommended present back to me.  Other option is to proceed directly with right thyroid lobectomy.  I am happy to see her back to discuss, o/w place referral to endocrine and see me after       Thyroid anatomy was discussed, and thyroid surgery was briefly discussed.  At this point we need more information to decide whether or not surgery may be necessary.  This would be discussed in greater detail at follow up, depending on the  fine needle aspiration results.     We will arrange follow up depending on results of the needle biopsy.       Tobacco cessation was strongly encouraged.  The associated risk of head and neck cancer was discussed.  Every year of cessation offers health benefits. This was discussed with the patient today and they voiced understanding.  Quit tools and a nicotine patch were offered.          Monika Mcclellan D.O.  Otolaryngology/Head and Neck Surgery  Allergy

## 2021-04-20 ENCOUNTER — OFFICE VISIT (OUTPATIENT)
Dept: OTOLARYNGOLOGY | Facility: OTHER | Age: 71
End: 2021-04-20
Attending: NURSE PRACTITIONER
Payer: COMMERCIAL

## 2021-04-20 VITALS
HEIGHT: 61 IN | BODY MASS INDEX: 32.85 KG/M2 | WEIGHT: 174 LBS | SYSTOLIC BLOOD PRESSURE: 104 MMHG | HEART RATE: 55 BPM | OXYGEN SATURATION: 95 % | DIASTOLIC BLOOD PRESSURE: 62 MMHG | TEMPERATURE: 97.3 F | RESPIRATION RATE: 16 BRPM

## 2021-04-20 DIAGNOSIS — Z98.890 HISTORY OF THYROID SURGERY: Primary | ICD-10-CM

## 2021-04-20 DIAGNOSIS — E04.1 THYROID NODULE: ICD-10-CM

## 2021-04-20 DIAGNOSIS — Z71.6 TOBACCO ABUSE COUNSELING: ICD-10-CM

## 2021-04-20 PROCEDURE — 31575 DIAGNOSTIC LARYNGOSCOPY: CPT | Performed by: OTOLARYNGOLOGY

## 2021-04-20 PROCEDURE — 99204 OFFICE O/P NEW MOD 45 MIN: CPT | Mod: 25 | Performed by: OTOLARYNGOLOGY

## 2021-04-20 PROCEDURE — G0463 HOSPITAL OUTPT CLINIC VISIT: HCPCS

## 2021-04-20 ASSESSMENT — PAIN SCALES - GENERAL: PAINLEVEL: NO PAIN (0)

## 2021-04-20 ASSESSMENT — MIFFLIN-ST. JEOR: SCORE: 1251.64

## 2021-04-20 NOTE — NURSING NOTE
"Chief Complaint   Patient presents with     Consult     Thyroid Nodule; Referred by Ana Valdez       Initial /62 (BP Location: Right arm, Cuff Size: Adult Regular)   Pulse 55   Temp 97.3  F (36.3  C)   Resp 16   Ht 1.549 m (5' 1\")   Wt 78.9 kg (174 lb)   SpO2 95%   BMI 32.88 kg/m   Estimated body mass index is 32.88 kg/m  as calculated from the following:    Height as of this encounter: 1.549 m (5' 1\").    Weight as of this encounter: 78.9 kg (174 lb).  Medication Reconciliation: complete  Diane Kwok LPN  "
Scope #: [id number]    
-please send autoimmune encephalitis panel from serum  -pending serum studies- ANNIA, anti-RI, lyme, ceruloplasmin, copper, CMV IgG, mycoplasma IgM  -pending CSF studies- MBP, OCB  -4 MISC chem pending- NMDA serum, paraneoplastic CSF, autoimmune CSF, anti DNase serum  -neurologically cleared for transfer to [facility] facility  -rec. f/up with Dr. Batista in 2-3 weeks

## 2021-04-20 NOTE — PATIENT INSTRUCTIONS
Thank you for allowing Dr. Mcclellan and our ENT team to participate in your care.  If your medications are too expensive, please give the nurse a call.  We can possibly change this medication.  If you have a scheduling or an appointment question please contact our Health Unit Coordinator at their direct line [phone].   ALL nursing questions or concerns can be directed to your ENT nurse at: [phone] - [place]    Complete Thyroid Biopsy    We will call you with the results and plan a follow up accordingly

## 2021-04-20 NOTE — LETTER
2021         RE: Aga Gary  [address] [place] [address]        Dear Colleague,    Thank you for referring your patient, Aga Gary, to the [facility]. Please see a copy of my visit note below.      Otolaryngology Consultation    Patient: Aga Gary  : 1950    Patient presents with:  Consult: Thyroid Nodule; Referred by Ana Valdez      HPI:  Aga Gary (DAR) is a 70 year old female seen today at the request of Ana Valdez  for evaluation of a thyroid nodule.    This nodule was found incidentally on CT chest  Distant left thyroid lobectomy in which she describes nodule removal from the left lobe.   No post operative dysphonia and no difficulty with bleeding or anesthesia.    The patient denies dysphonia, dysphagia or any other compressive symptoms.  There is no family history of thyroid cancer, and no personal history of head or neck irradiation.    The thyroid ultrasound was reviewed dated 3/23/2021  The dominant nodule(s) measures Right 3.2 cm nodule cystic and solid  Status post partial left thyroid lobectomy.  The remaining left lobe is without nodule.    Thyroid labs were reviewed.  TSH normal on 3/1/21 at 4.00  Calcium 9.2 on 3/1/21  The patient denies tremor or weight loss. She has noted hair loss    40 pack yr hx tobacco, current smoker    US 3/23/21  R lobe 5.9 x 2.4 x 2.9 cm.  There is a 3.2 cm diameter mass with both cystic and solid components.  The left lobe of the thyroid measures 1.8 x 0.5 x 0.6 cm. Portion of  the left lobe is been removed.    Here with daughter Swapna    Current Outpatient Rx   Medication Sig Dispense Refill     amLODIPine (NORVASC) 10 MG tablet TAKE 1 TABLET (10 MG) BY MOUTH DAILY 90 tablet 1     aspirin 81 MG EC tablet Take 1 tablet (81 mg) by mouth daily 90 tablet 3     capsaicin (ZOSTRIX) 0.025 % external cream Apply 1 g topically 3 times daily 120 g 3     cholecalciferol (VITAMIN D3) 5000 units TABS  tablet Take 1 tablet (5,000 Units) by mouth daily 90 tablet 11     citalopram (CELEXA) 10 MG tablet Take 1 tablet (10 mg) by mouth daily 90 tablet 1     losartan (COZAAR) 100 MG tablet TAKE 1 TABLET (100 MG) BY MOUTH DAILY FOR BLOOD PRESSURE 90 tablet 1     metFORMIN (GLUCOPHAGE-XR) 500 MG 24 hr tablet TAKE 1 TABLET (500 MG) BY MOUTH 2 TIMES DAILY (WITH MEALS) 60 tablet 3     metoprolol tartrate (LOPRESSOR) 50 MG tablet TAKE 1 TABLET BY MOUTH TWICE A DAY FOR HEART AND BLOOD PRESSURE. 180 tablet 1     Multiple Vitamins-Iron (DAILY MULTIPLE VITAMIN/IRON) TABS Take 1 tablet by mouth daily 90 tablet 11     nystatin (MYCOSTATIN) 206807 UNIT/GM external cream Apply topically 2 times daily (Patient not taking: Reported on 3/1/2021) 60 g 1     omega 3 1000 MG CAPS 3 po daily (Patient taking differently: 1 capsule daily) 90 capsule 11     simvastatin (ZOCOR) 40 MG tablet TAKE 1 TABLET (40MG) BY MOUTH ONCE DAILY. 90 tablet 1       Allergies: Patient has no known allergies.     Past Medical History:   Diagnosis Date     Benign essential hypertension 4/13/2018     Calculus of kidney 1/3/2020     Current mild episode of major depressive disorder without prior episode (H) 3/1/2021     Hyperlipidemia LDL goal <100 4/13/2018     Taking a statin medication 4/13/2018     Tobacco abuse 4/13/2018     Type 2 diabetes mellitus without complication, without long-term current use of insulin (H) 1/14/2020       Past Surgical History:   Procedure Laterality Date     ABDOMEN SURGERY      2 c-sections     CHOLECYSTECTOMY  1994     GYN SURGERY      total hyst,, no cervix     HC REMOVAL OF NAIL PLATE SIMPLE SINGLE  04/19/2018    removal of 1/4  left gt toenail, local      HEAD & NECK SURGERY      tumor neck, benign     ORTHOPEDIC SURGERY Bilateral     carpul tunnel        ENT family history reviewed    Social History     Tobacco Use     Smoking status: Heavy Tobacco Smoker     Packs/day: 1.00     Years: 40.00     Pack years: 40.00     Types:  "Cigarettes     Start date: 1/1/1972     Smokeless tobacco: Never Used     Tobacco comment: pt is slowly cutting down- 1 pack a day    Substance Use Topics     Alcohol use: No     Drug use: No       Review of Systems  ROS: 10 point ROS neg other than the symptoms noted above in the HPI and hair loss cold intolerance    Physical Exam  /62 (BP Location: Right arm, Cuff Size: Adult Regular)   Pulse 55   Temp 97.3  F (36.3  C)   Resp 16   Ht 1.549 m (5' 1\")   Wt 78.9 kg (174 lb)   SpO2 95%   BMI 32.88 kg/m    General - The patient is well nourished and well developed, and appears to have good nutritional status.  Alert and oriented to person and place, answers questions and cooperates with examination appropriately.   Head and Face - Normocephalic and atraumatic, with no gross asymmetry noted.  The facial nerve is intact, with strong symmetric movements.  Voice and Breathing - The patient was breathing comfortably without the use of accessory muscles. There was no wheezing, stridor, or stertor.  The patients voice was clear and strong, and had appropriate pitch and quality.  Ears -The external auditory canals are patent, the tympanic membranes are intact without effusion, retraction or mass.  Bony landmarks are intact.  Eyes - Extraocular movements intact, and the pupils were reactive to light.  Sclera were not icteric or injected, conjunctiva were pink and moist.  Mouth - Examination of the oral cavity showed pink, healthy oral mucosa. No lesions or ulcerations noted.  The tongue was mobile and midline, and the lower dentition were in scattered condition.  Upper edentulous, no palate mass  Throat - The walls of the oropharynx were smooth, pink, moist, symmetric, and had no lesions or ulcerations.  The tonsillar pillars and soft palate were symmetric.  The uvula was midline on elevation.    Neck - Normal midline excursion of the laryngotracheal complex during swallowing.  Full range of motion on passive " movement.  Palpation of the occipital, submental, submandibular, internal jugular chain, and supraclavicular nodes did not demonstrate any abnormal lymph nodes or masses.  Palpation of the thyroid was irregular with a firm palpable RIGHT lobe nodule measuring 3 cm.   No palpable left nodule or goiter   the trachea was mobile and midline.  Nose - External contour is symmetric, no gross deflection or scars.  Nasal mucosa is pink and moist with no abnormal mucus.  The septum was intact, turbinates of normal size and position.  No polyps, masses, or purulence noted on examination.    Attempts at mirror laryngoscopy were not possible due to gag reflex.  Therefore I proceeded with a fiberoptic examination after informed consent.  First I sprayed both sides of the nose with a mixture of lidocaine and neosynephrine.  I then passed the scope through the nasal cavity.  The nasal cavity was unremarkable.  The nasopharynx was mucosally covered and symmetric.  The eustachian tube openings were unobstructed.  Going further, the base of tongue was free of lesions, and the vallecula was open.  The epiglottis was smooth and mucosally covered.  The supraglottic larynx was then clearly visualized.  The vocal cords moved smoothly and symmetrically.  The pyriform sinuses were open and without bernice mass or pooling of secretions, and the limited view of the postcricoid region did not show any lesions.  The patient tolerated the procedure well.      Impression and Plan- Aga (KARLIE) SHREYAS Gary is a 70 year old female with:    ICD-10-CM    1. History of partial left thyroid lobectomy  Z98.890     1970s   2. RIGHT Thyroid nodule  E04.1 US Biopsy Thyroid Fine Needle Aspiration   3. Tobacco abuse counseling  Z71.6          The remainder of today's visit was spent discussing the options in this situation.  Also, I educated the patient about how common thyroid nodules are, and the fact that there is actually a low probability of this being a  malignancy.  We also discussed the importance of follow up due to the possibility of false negatives with testing.      The next step is obtaining cellular material to decide whether or not surgery may be necessary, or clinical surveillance with repeat ultrasounds and clinical follow up.   US guided  fine needle aspiration is pending.    The possibility of inadequate cellular material or non-diagnostic results, and the possibility of repeat fine needle aspiration was discussed with the patient    FNAB Right 3.2 cm nodule cystic and solid pending         Thyroid anatomy was discussed, and thyroid surgery was briefly discussed.  At this point we need more information to decide whether or not surgery may be necessary.  This would be discussed in greater detail at follow up, depending on the  fine needle aspiration results.     We will arrange follow up depending on results of the needle biopsy.       Tobacco cessation was strongly encouraged.  The associated risk of head and neck cancer was discussed.  Every year of cessation offers health benefits. This was discussed with the patient today and they voiced understanding.  Quit tools and a nicotine patch were offered.          Monika Mcclellan D.O.  Otolaryngology/Head and Neck Surgery  Allergy            Again, thank you for allowing me to participate in the care of your patient.        Sincerely,        Moinka Mcclellan MD

## 2021-04-23 ENCOUNTER — DOCUMENTATION ONLY (OUTPATIENT)
Dept: INTERVENTIONAL RADIOLOGY/VASCULAR | Facility: HOSPITAL | Age: 71
End: 2021-04-23

## 2021-04-23 RX ORDER — LIDOCAINE HYDROCHLORIDE 10 MG/ML
10 INJECTION, SOLUTION EPIDURAL; INFILTRATION; INTRACAUDAL; PERINEURAL ONCE
Status: CANCELLED | OUTPATIENT
Start: 2021-05-05

## 2021-04-23 NOTE — PROGRESS NOTES
Patient called to schedule thyroid biopsy.  Patient denies need or desire for valium at this time.  Patient made aware to be in admitting at the [place] entrance to the hospitals at 0915 on 05/05.  Advised that she can eat a light breakfast, take her usual morning meds and that she can drive herself to the appointment if she chooses.  Questions answered, patient provided our telephone number ([phone]) if she has any further questions that come up.

## 2021-04-27 DIAGNOSIS — I10 BENIGN ESSENTIAL HYPERTENSION: ICD-10-CM

## 2021-04-27 RX ORDER — METOPROLOL TARTRATE 50 MG
TABLET ORAL
Qty: 180 TABLET | Refills: 1 | Status: SHIPPED | OUTPATIENT
Start: 2021-04-27 | End: 2021-09-03

## 2021-04-27 RX ORDER — LOSARTAN POTASSIUM 100 MG/1
TABLET ORAL
Qty: 90 TABLET | Refills: 1 | Status: SHIPPED | OUTPATIENT
Start: 2021-04-27 | End: 2021-09-03

## 2021-04-27 NOTE — TELEPHONE ENCOUNTER
Cozaar      Last Written Prescription Date:  11/2/20  Last Fill Quantity: 90,   # refills: 1  Last Office Visit: 3/1/21  Future Office visit:       Routing refill request to provider for review/approval because:        Metoprolol      Last Written Prescription Date:  10/28/20  Last Fill Quantity: 180,   # refills: 1  Last Office Visit: 3/1/21  Future Office visit:       Routing refill request to provider for review/approval because:

## 2021-05-04 ENCOUNTER — TELEPHONE (OUTPATIENT)
Dept: INTERVENTIONAL RADIOLOGY/VASCULAR | Facility: HOSPITAL | Age: 71
End: 2021-05-04

## 2021-05-05 ENCOUNTER — HOSPITAL ENCOUNTER (OUTPATIENT)
Dept: ULTRASOUND IMAGING | Facility: HOSPITAL | Age: 71
End: 2021-05-05
Attending: OTOLARYNGOLOGY
Payer: COMMERCIAL

## 2021-05-05 ENCOUNTER — HOSPITAL ENCOUNTER (OUTPATIENT)
Facility: HOSPITAL | Age: 71
Discharge: HOME | End: 2021-05-05
Attending: RADIOLOGY | Admitting: RADIOLOGY
Payer: COMMERCIAL

## 2021-05-05 VITALS
SYSTOLIC BLOOD PRESSURE: 121 MMHG | HEART RATE: 57 BPM | DIASTOLIC BLOOD PRESSURE: 78 MMHG | OXYGEN SATURATION: 96 % | RESPIRATION RATE: 16 BRPM

## 2021-05-05 DIAGNOSIS — E04.1 THYROID NODULE: ICD-10-CM

## 2021-05-05 PROCEDURE — 88173 CYTOPATH EVAL FNA REPORT: CPT | Mod: TC | Performed by: RADIOLOGY

## 2021-05-05 PROCEDURE — 250N000009 HC RX 250

## 2021-05-05 PROCEDURE — 10005 FNA BX W/US GDN 1ST LES: CPT

## 2021-05-05 PROCEDURE — 88305 TISSUE EXAM BY PATHOLOGIST: CPT | Mod: TC | Performed by: RADIOLOGY

## 2021-05-05 PROCEDURE — 88172 CYTP DX EVAL FNA 1ST EA SITE: CPT | Mod: TC | Performed by: RADIOLOGY

## 2021-05-05 PROCEDURE — 999N001018 HC STATISTIC H-CELL BLOCK W/STAIN: Performed by: RADIOLOGY

## 2021-05-05 RX ORDER — LIDOCAINE HYDROCHLORIDE 10 MG/ML
INJECTION, SOLUTION EPIDURAL; INFILTRATION; INTRACAUDAL; PERINEURAL
Status: DISCONTINUED
Start: 2021-05-05 | End: 2021-05-05 | Stop reason: HOSPADM

## 2021-05-05 RX ORDER — LIDOCAINE HYDROCHLORIDE 10 MG/ML
10 INJECTION, SOLUTION EPIDURAL; INFILTRATION; INTRACAUDAL; PERINEURAL ONCE
Status: COMPLETED | OUTPATIENT
Start: 2021-05-05 | End: 2021-05-05

## 2021-05-05 RX ORDER — LIDOCAINE HYDROCHLORIDE 10 MG/ML
INJECTION, SOLUTION EPIDURAL; INFILTRATION; INTRACAUDAL; PERINEURAL
Status: COMPLETED
Start: 2021-05-05 | End: 2021-05-05

## 2021-05-05 RX ADMIN — LIDOCAINE HYDROCHLORIDE 3 ML: 10 INJECTION, SOLUTION EPIDURAL; INFILTRATION; INTRACAUDAL; PERINEURAL at 09:38

## 2021-05-05 NOTE — PROGRESS NOTES
Procedure: Fine Needle Biopsy, Thyroid, right    There were  no complications and patient has no symptoms..      Tolerated procedure well.    Patient to US.  Consent complete.  Supine on Ultrasound table.      Radiologist:Dr. Helton    Time Out: Prior to the start of the procedure and with procedural staff participation, I verbally confirmed the patient s identity using two indicators, relevant allergies, that the procedure was appropriate and matched the consent or emergent situation, and that the correct equipment/implants were available. Immediately prior to starting the procedure I conducted the Time Out with the procedural staff and re-confirmed the patient s name, procedure, and site/side. (The Joint Commission universal protocol was followed.)  Yes    Position:  supine    Pain:  0    Sedation:None. Local Anesthestic used  No sedation    Estimated Blood Loss: Minimal     Condition: Stable    Comments: See dictated procedure note for full details     Makeda Cartagena RN

## 2021-05-05 NOTE — IP AVS SNAPSHOT
"After Visit Summary Template Not Found    This Print Group is only intended to be used in the After Visit Summary and can only be used in a report that uses a released After Visit Summary Template.                       MRN:[id number]                      After Visit Summary   5/5/2021    Aga Gary    MRN: [id number]           Visit Information        Provider Department      5/5/2021  9:30 AM HIXRRN; HIIRRAD; HIUS1 HI ULTRASOUND           Review of your medicines      Notice    This visit is during an admission. Changes to the med list made in this visit will be reflected in the After Visit Summary of the admission.           Protect others around you: Learn how to safely use, store and throw away your medicines at www.disposemymeds.org.       Follow-ups after your visit       Your next 10 appointments already scheduled    Sep 03, 2021  1:45 PM  (Arrive by 1:30 PM)  SHORT with Ana Valdez CNP  [facility] ([facility] ) [address]  [facility] [address]  [phone]         Care Instructions       Further instructions from your care team           DISCHARGE INSTRUCTIONS  Needle Aspiration      IMPORTANT: As you prepare for discharge, the following information will help you return to your best level of health.               This Information Is About Your Follow Up Care     Call your doctor if you do not get better. Call sooner if you feel worse. You can reach your doctor by calling their clinic phone number.                                    This Information Is About Procedures      NEEDLE ASPIRATION  You have had a needle aspiration.  A needle aspiration (also called a \"fine needle biopsy\") is a procedure used to take a sample of cells or tissues from a lump or mass or other area of concern.  The sample of cells taken during the procedure can then be checked in the lab under a microscope to find out if there is cancer or other diseases.  "  Sometimes a needle aspiration is done to check the effect of treatment on a known tumor.    When you go home, follow these instructions:    Check the site frequently for bleeding, swelling, redness, or drainage.    Use an ice pack at the site for 20 minutes at a time if needed for pain.    Rest for the remainder of the day.    Don't drive for 24 hours if you received medicine to relax you during the procedure.    Return to your usual diet.    Do not take aspirin or anti-inflammatory medicines like ibuprofen or naproxen (check with your doctor if you take aspirin for heart disease or stroke).    You may take acetaminophen (Tylenol) for pain if needed.    Do not take blood thinner medicines until your doctor tells you to restart them.    Call your doctor if you have:    Continued bleeding    Swelling or a lump at the needle site    Pain not made better by acetaminophen    Fever or chills    Redness or drainage from the needle site    Chest pain or trouble breathing    Any questions or concerns                      Additional Information About Your Visit       Care EveryWhere ID    This is your Care EveryWhere ID. This could be used by other organizations to access your [facility] medical records  [id number]       Your Vitals Were  Most recent update: 5/5/2021  9:28 AM    Blood Pressure   121/78 (BP Location: Left arm, Cuff Size: Adult Regular)    Pulse   57    Respirations   16    Pulse Oximetry   96%           Primary Care Provider Office Phone # Fax #    Ana ValdezRENNY [phone]      Equal Access to Services    CLAUDIA COOL : Hadii simón sosao Sonaveed, waaxda luqadaha, qaybta kaalmada murtaza, chandrika edward. So [facility] [phone].    ATENCIÓN: Si habla español, tiene a roy disposición servicios gratuitos de asistencia lingüística. Flavia al [phone].    We comply with applicable federal and state civil rights laws, including the [place] Human Rights Act. We do  not discriminate on the basis of race, color, creed, Latter-day, national origin, marital status, age, disability, sex, sexual orientation, or gender identity.    If you would like an itemization of your charges they will now be available in [employer] 30 days after discharge. To access the itemized statements in [employer] go to billing/billing summary. From there select view account. There will be multiple tabs showing an overview of your account, detail, payments, and communications. From the communications tab you can see your monthly statements, your itemized statements, and any billing letters generated for your account. If you do not have a [employer] account and need help getting access please contact [employer] support at [phone].  If you would prefer to have your itemized statements mailed please contact our automated itemized bill request line at [phone] option  2.       Thank you!    Thank you for choosing [facility] for your care. Our goal is always to provide you with excellent care. Hearing back from our patients is one way we can continue to improve our services. Please take a few minutes to complete the written survey that you may receive in the mail after you visit with us. Thank you!         Medication List     Notice    This visit is during an admission. Changes to the med list made in this visit will be reflected in the After Visit Summary of the admission.

## 2021-05-06 ENCOUNTER — TELEPHONE (OUTPATIENT)
Dept: INTERVENTIONAL RADIOLOGY/VASCULAR | Facility: HOSPITAL | Age: 71
End: 2021-05-06

## 2021-05-10 LAB — COPATH REPORT: NORMAL

## 2021-05-11 ENCOUNTER — TELEPHONE (OUTPATIENT)
Dept: OTOLARYNGOLOGY | Facility: OTHER | Age: 71
End: 2021-05-11

## 2021-05-11 DIAGNOSIS — Z98.890 HISTORY OF THYROID SURGERY: ICD-10-CM

## 2021-05-11 DIAGNOSIS — E04.1 THYROID NODULE: Primary | ICD-10-CM

## 2021-05-11 NOTE — TELEPHONE ENCOUNTER
----- Message from Monika Mcclellan MD sent at 5/11/2021  4:31 PM CDT -----  Send to Dr. Lyles then have her see me back after his consultation is available to me for right thyroid nodule atypia (AUS)

## 2021-05-14 DIAGNOSIS — E78.5 HYPERLIPIDEMIA LDL GOAL <100: ICD-10-CM

## 2021-05-17 RX ORDER — SIMVASTATIN 40 MG
TABLET ORAL
Qty: 90 TABLET | Refills: 1 | Status: SHIPPED | OUTPATIENT
Start: 2021-05-17 | End: 2021-12-02

## 2021-05-17 NOTE — TELEPHONE ENCOUNTER
Simvastatin 40 mg      Last Written Prescription Date:  11/10/20  Last Fill Quantity: 90,   # refills: 1  Last Office Visit: 8/23/20  Future Office visit:  9/23/21     Routing refill request to provider for review/approval because:  Drug not on the [facility], P or [facility] refill protocol or controlled substance

## 2021-06-10 DIAGNOSIS — F32.0 CURRENT MILD EPISODE OF MAJOR DEPRESSIVE DISORDER WITHOUT PRIOR EPISODE (H): ICD-10-CM

## 2021-06-10 NOTE — TELEPHONE ENCOUNTER
celexa      Last Written Prescription Date:  3/1/21  Last Fill Quantity: 90,   # refills: 1  Last Office Visit: 3/1/21  Future Office visit:    Next 5 appointments (look out 90 days)    Sep 03, 2021  1:45 PM  (Arrive by 1:30 PM)  SHORT with Ana Valdez CNP  [facility] ([facility] ) [address]  [facility] [address]  [phone]

## 2021-06-12 RX ORDER — CITALOPRAM HYDROBROMIDE 10 MG/1
10 TABLET ORAL DAILY
Qty: 90 TABLET | Refills: 1 | Status: SHIPPED | OUTPATIENT
Start: 2021-06-12 | End: 2021-09-03

## 2021-07-14 DIAGNOSIS — I10 BENIGN ESSENTIAL HYPERTENSION: ICD-10-CM

## 2021-07-14 RX ORDER — AMLODIPINE BESYLATE 10 MG/1
10 TABLET ORAL DAILY
Qty: 90 TABLET | Refills: 0 | Status: SHIPPED | OUTPATIENT
Start: 2021-07-14 | End: 2021-09-03

## 2021-08-12 DIAGNOSIS — E11.9 TYPE 2 DIABETES MELLITUS WITHOUT COMPLICATION, WITHOUT LONG-TERM CURRENT USE OF INSULIN (H): ICD-10-CM

## 2021-08-12 RX ORDER — METFORMIN HCL 500 MG
500 TABLET, EXTENDED RELEASE 24 HR ORAL 2 TIMES DAILY WITH MEALS
Qty: 60 TABLET | Refills: 0 | Status: SHIPPED | OUTPATIENT
Start: 2021-08-12 | End: 2021-09-03

## 2021-09-02 NOTE — PROGRESS NOTES
Assessment & Plan       Type 2 diabetes mellitus without complication, without long-term current use of insulin (H)  - Hemoglobin A1c  - Comprehensive metabolic panel  - TSH with free T4 reflex  - CBC with platelets and differential  - Albumin Random Urine Quantitative with Creat Ratio  - metFORMIN (GLUCOPHAGE-XR) 500 MG 24 hr tablet; Take 1 tablet (500 mg) by mouth 2 times daily (with meals)    Hyperlipidemia LDL goal <100  - Zocor as prescribed  - Comprehensive metabolic panel  - Lipid Profile (Chol, Trig, HDL, LDL calc)    Benign essential hypertension  - Comprehensive metabolic panel  - TSH with free T4 reflex  - amLODIPine (NORVASC) 10 MG tablet; Take 1 tablet (10 mg) by mouth daily  - metoprolol tartrate (LOPRESSOR) 50 MG tablet; TAKE 1 TABLET BY MOUTH TWICE A DAY FOR HEART AND BLOOD PRESSURE.  - losartan (COZAAR) 100 MG tablet; TAKE 1 TABLET (100 MG) BY MOUTH DAILY FOR BLOOD PRESSURE    Current mild episode of major depressive disorder without prior episode (H)  - citalopram (CELEXA) 20 MG tablet; Take 1 tablet (20 mg) by mouth daily - dose incrase    Menopause present  - DX Hip/Pelvis/Spine; Future        Return in about 6 months (around 3/3/2022) for Chronic disease management, am fasting.      Ana Valdez, RENNY  [facility] Martin is a 70 year old who presents for the following health issues       Diabetes Follow-up    How often are you checking your blood sugar? Not at all    What concerns do you have today about your diabetes? None     Do you have any of these symptoms? (Select all that apply)  Numbness in feet and Burning in feet    Have you had a diabetic eye exam in the last 12 months? No       Diabetic foot exam: normal DP and PT pulses, no trophic changes or ulcerative lesions and normal sensory exam      Hyperlipidemia Follow-Up    Are you regularly taking any medication or supplement to lower your cholesterol?   Yes- simvastatin 40 mg daily    Are you having muscle  aches or other side effects that you think could be caused by your cholesterol lowering medication?  No      Hypertension Follow-up    Do you check your blood pressure regularly outside of the clinic? No     Are you following a low salt diet? No    Are your blood pressures ever more than 140 on the top number (systolic) OR more   than 90 on the bottom number (diastolic), for example 140/90? No         BP Readings from Last 2 Encounters:   09/03/21 112/62   05/05/21 121/78     Hemoglobin A1C (%)   Date Value   03/01/2021 5.6   08/28/2020 5.9 (H)     LDL Cholesterol Calculated (mg/dL)   Date Value   03/01/2021 82   08/28/2020 85       Depression Followup    How are you doing with your depression since your last visit? No change    Are you having other symptoms that might be associated with depression? No    Have you had a significant life event?  No     Are you feeling anxious or having panic attacks?   Yes:  anxious    Do you have any concerns with your use of alcohol or other drugs? No      Social History     Tobacco Use     Smoking status: Heavy Tobacco Smoker     Packs/day: 1.00     Years: 49.00     Pack years: 49.00     Types: Cigarettes     Start date: 1/1/1972     Smokeless tobacco: Never Used     Tobacco comment: pt is slowly cutting down- 1 pack a day    Substance Use Topics     Alcohol use: No     Drug use: No     PHQ 1/3/2020 3/1/2021 9/3/2021   PHQ-9 Total Score 0 16 14   Q9: Thoughts of better off dead/self-harm past 2 weeks Not at all Not at all Not at all       ORTEGA-7 SCORE 1/3/2020 3/1/2021 9/3/2021   Total Score 0 15 5         Last PHQ-9 9/3/2021   1.  Little interest or pleasure in doing things 2   2.  Feeling down, depressed, or hopeless 2   3.  Trouble falling or staying asleep, or sleeping too much 3   4.  Feeling tired or having little energy 2   5.  Poor appetite or overeating 2   6.  Feeling bad about yourself 2   7.  Trouble concentrating 1   8.  Moving slowly or restless 0   Q9: Thoughts of  better off dead/self-harm past 2 weeks 0   PHQ-9 Total Score 14   Difficulty at work, home, or with people Very difficult         ORTEGA-7  9/3/2021   1. Feeling nervous, anxious, or on edge 1   2. Not being able to stop or control worrying 1   3. Worrying too much about different things 1   4. Trouble relaxing 1   5. Being so restless that it is hard to sit still 0   6. Becoming easily annoyed or irritable 1   7. Feeling afraid, as if something awful might happen 0   ORTEGA-7 Total Score 5   If you checked any problems, how difficult have they made it for you to do your work, take care of things at home, or get along with other people? Somewhat difficult         Patient Active Problem List   Diagnosis     Benign essential hypertension     Tobacco abuse     Hyperlipidemia LDL goal <100     Taking a statin medication     Morbid obesity (H)     Calculus of kidney     Type 2 diabetes mellitus without complication, without long-term current use of insulin (H)     Current mild episode of major depressive disorder without prior episode (H)     RIGHT Thyroid nodule     History of partial left thyroid lobectomy     Tobacco abuse counseling     Past Surgical History:   Procedure Laterality Date     ABDOMEN SURGERY      2 c-sections     CHOLECYSTECTOMY  1994     GYN SURGERY      total hyst,, no cervix     HC REMOVAL OF NAIL PLATE SIMPLE SINGLE  04/19/2018    removal of 1/4  left gt toenail, local      HEAD & NECK SURGERY      tumor neck, benign     ORTHOPEDIC SURGERY Bilateral     carpul tunnel        Social History     Tobacco Use     Smoking status: Heavy Tobacco Smoker     Packs/day: 1.00     Years: 49.00     Pack years: 49.00     Types: Cigarettes     Start date: 1/1/1972     Smokeless tobacco: Never Used     Tobacco comment: pt is slowly cutting down- 1 pack a day    Substance Use Topics     Alcohol use: No     Family History   Problem Relation Age of Onset     Other Cancer Mother      Other Cancer Brother      Breast Cancer  Paternal Aunt            Current Outpatient Medications   Medication Sig Dispense Refill     amLODIPine (NORVASC) 10 MG tablet TAKE 1 TABLET (10 MG) BY MOUTH DAILY 90 tablet 0     aspirin 81 MG EC tablet Take 1 tablet (81 mg) by mouth daily 90 tablet 3     capsaicin (ZOSTRIX) 0.025 % external cream Apply 1 g topically 3 times daily 120 g 3     cholecalciferol (VITAMIN D3) 5000 units TABS tablet Take 1 tablet (5,000 Units) by mouth daily 90 tablet 11     citalopram (CELEXA) 10 MG tablet TAKE 1 TABLET (10 MG) BY MOUTH DAILY 90 tablet 1     losartan (COZAAR) 100 MG tablet TAKE 1 TABLET (100 MG) BY MOUTH DAILY FOR BLOOD PRESSURE 90 tablet 1     metFORMIN (GLUCOPHAGE-XR) 500 MG 24 hr tablet TAKE 1 TABLET (500 MG) BY MOUTH 2 TIMES DAILY (WITH MEALS) 60 tablet 0     metoprolol tartrate (LOPRESSOR) 50 MG tablet TAKE 1 TABLET BY MOUTH TWICE A DAY FOR HEART AND BLOOD PRESSURE. 180 tablet 1     Multiple Vitamins-Iron (DAILY MULTIPLE VITAMIN/IRON) TABS Take 1 tablet by mouth daily 90 tablet 11     nystatin (MYCOSTATIN) 105871 UNIT/GM external cream Apply topically 2 times daily 60 g 1     omega 3 1000 MG CAPS 3 po daily (Patient taking differently: 1 capsule daily) 90 capsule 11     simvastatin (ZOCOR) 40 MG tablet TAKE 1 TABLET (40MG) BY MOUTH ONCE DAILY. 90 tablet 1       No Known Allergies       Recent Labs   Lab Test 03/01/21  1322 08/28/20  1330 01/03/20  1454   A1C 5.6 5.9* 7.2*   LDL 82 85 119*   HDL 49* 42* 40*   TRIG 130 150* 204*   ALT 27 25 50   CR 0.78 0.72 0.86   GFRESTIMATED 78 86 69   GFRESTBLACK >90 >90 80   POTASSIUM 4.1 4.1 4.0   TSH 4.00 2.86  --         BP Readings from Last 3 Encounters:   09/03/21 112/62   05/05/21 121/78   04/20/21 104/62    Wt Readings from Last 3 Encounters:   09/03/21 75.3 kg (165 lb 14.4 oz)   04/20/21 78.9 kg (174 lb)   03/01/21 78.9 kg (174 lb)              Review of Systems   Constitutional, HEENT, cardiovascular, pulmonary, GI, , musculoskeletal, neuro, skin, endocrine and psych  systems are negative, except as otherwise noted.          Objective    /62 (BP Location: Left arm, Patient Position: Sitting, Cuff Size: Adult Regular)   Pulse 63   Temp 97.9  F (36.6  C) (Tympanic)   Resp 18   Wt 75.3 kg (165 lb 14.4 oz)   SpO2 93%   BMI 31.35 kg/m    Body mass index is 31.35 kg/m .       Physical Exam   GENERAL: healthy, alert and no distress  EYES: Eyes grossly normal to inspection, PERRL and conjunctivae and sclerae normal  HENT: ear canals and TM's normal, nose and mouth without ulcers or lesions  NECK: no adenopathy, no asymmetry, masses, or scars and thyroid normal to palpation  RESP: lungs clear to auscultation - no rales, rhonchi or wheezes  CV: regular rate and rhythm, normal S1 S2, no S3 or S4, no murmur, click or rub, no peripheral edema and peripheral pulses strong  MS: no gross musculoskeletal defects noted, no edema  SKIN: no suspicious lesions or rashes  PSYCH: mentation appears normal, affect normal/bright  Diabetic foot exam: normal DP and PT pulses, no trophic changes or ulcerative lesions and normal sensory exam

## 2021-09-03 ENCOUNTER — OFFICE VISIT (OUTPATIENT)
Dept: FAMILY MEDICINE | Facility: OTHER | Age: 71
End: 2021-09-03
Attending: NURSE PRACTITIONER
Payer: COMMERCIAL

## 2021-09-03 VITALS
TEMPERATURE: 97.9 F | RESPIRATION RATE: 18 BRPM | DIASTOLIC BLOOD PRESSURE: 62 MMHG | SYSTOLIC BLOOD PRESSURE: 112 MMHG | OXYGEN SATURATION: 93 % | BODY MASS INDEX: 31.35 KG/M2 | HEART RATE: 63 BPM | WEIGHT: 165.9 LBS

## 2021-09-03 DIAGNOSIS — Z78.0 MENOPAUSE PRESENT: ICD-10-CM

## 2021-09-03 DIAGNOSIS — I10 BENIGN ESSENTIAL HYPERTENSION: ICD-10-CM

## 2021-09-03 DIAGNOSIS — E78.5 HYPERLIPIDEMIA LDL GOAL <100: ICD-10-CM

## 2021-09-03 DIAGNOSIS — F32.0 CURRENT MILD EPISODE OF MAJOR DEPRESSIVE DISORDER WITHOUT PRIOR EPISODE (H): ICD-10-CM

## 2021-09-03 DIAGNOSIS — E11.9 TYPE 2 DIABETES MELLITUS WITHOUT COMPLICATION, WITHOUT LONG-TERM CURRENT USE OF INSULIN (H): Primary | ICD-10-CM

## 2021-09-03 PROBLEM — Z71.6 TOBACCO ABUSE COUNSELING: Status: RESOLVED | Noted: 2021-04-20 | Resolved: 2021-09-03

## 2021-09-03 LAB
ALBUMIN SERPL-MCNC: 3.7 G/DL (ref 3.4–5)
ALP SERPL-CCNC: 68 U/L (ref 40–150)
ALT SERPL W P-5'-P-CCNC: 22 U/L (ref 0–50)
ANION GAP SERPL CALCULATED.3IONS-SCNC: 6 MMOL/L (ref 3–14)
AST SERPL W P-5'-P-CCNC: 13 U/L (ref 0–45)
BASOPHILS # BLD AUTO: 0 10E3/UL (ref 0–0.2)
BASOPHILS NFR BLD AUTO: 0 %
BILIRUB SERPL-MCNC: 0.8 MG/DL (ref 0.2–1.3)
BUN SERPL-MCNC: 13 MG/DL (ref 7–30)
CALCIUM SERPL-MCNC: 8.9 MG/DL (ref 8.5–10.1)
CHLORIDE BLD-SCNC: 108 MMOL/L (ref 94–109)
CHOLEST SERPL-MCNC: 147 MG/DL
CO2 SERPL-SCNC: 24 MMOL/L (ref 20–32)
CREAT SERPL-MCNC: 0.77 MG/DL (ref 0.52–1.04)
CREAT UR-MCNC: 35 MG/DL
EOSINOPHIL # BLD AUTO: 0.2 10E3/UL (ref 0–0.7)
EOSINOPHIL NFR BLD AUTO: 2 %
ERYTHROCYTE [DISTWIDTH] IN BLOOD BY AUTOMATED COUNT: 14 % (ref 10–15)
EST. AVERAGE GLUCOSE BLD GHB EST-MCNC: 114 MG/DL
FASTING STATUS PATIENT QL REPORTED: ABNORMAL
GFR SERPL CREATININE-BSD FRML MDRD: 79 ML/MIN/1.73M2
GLUCOSE BLD-MCNC: 106 MG/DL (ref 70–99)
HBA1C MFR BLD: 5.6 % (ref 0–5.6)
HCT VFR BLD AUTO: 43.1 % (ref 35–47)
HDLC SERPL-MCNC: 43 MG/DL
HGB BLD-MCNC: 14.6 G/DL (ref 11.7–15.7)
HOLD SPECIMEN: NORMAL
LDLC SERPL CALC-MCNC: 74 MG/DL
LYMPHOCYTES # BLD AUTO: 3.5 10E3/UL (ref 0.8–5.3)
LYMPHOCYTES NFR BLD AUTO: 38 %
MCH RBC QN AUTO: 29.9 PG (ref 26.5–33)
MCHC RBC AUTO-ENTMCNC: 33.9 G/DL (ref 31.5–36.5)
MCV RBC AUTO: 88 FL (ref 78–100)
MICROALBUMIN UR-MCNC: <5 MG/L
MICROALBUMIN/CREAT UR: NORMAL MG/G{CREAT}
MONOCYTES # BLD AUTO: 0.5 10E3/UL (ref 0–1.3)
MONOCYTES NFR BLD AUTO: 6 %
NEUTROPHILS # BLD AUTO: 5 10E3/UL (ref 1.6–8.3)
NEUTROPHILS NFR BLD AUTO: 54 %
NONHDLC SERPL-MCNC: 104 MG/DL
PLATELET # BLD AUTO: 138 10E3/UL (ref 150–450)
POTASSIUM BLD-SCNC: 3.8 MMOL/L (ref 3.4–5.3)
PROT SERPL-MCNC: 8.2 G/DL (ref 6.8–8.8)
RBC # BLD AUTO: 4.89 10E6/UL (ref 3.8–5.2)
SODIUM SERPL-SCNC: 138 MMOL/L (ref 133–144)
TRIGL SERPL-MCNC: 152 MG/DL
TSH SERPL DL<=0.005 MIU/L-ACNC: 3.61 MU/L (ref 0.4–4)
WBC # BLD AUTO: 9.3 10E3/UL (ref 4–11)

## 2021-09-03 PROCEDURE — 36415 COLL VENOUS BLD VENIPUNCTURE: CPT | Mod: ZL | Performed by: NURSE PRACTITIONER

## 2021-09-03 PROCEDURE — 80061 LIPID PANEL: CPT | Mod: ZL | Performed by: NURSE PRACTITIONER

## 2021-09-03 PROCEDURE — 85025 COMPLETE CBC W/AUTO DIFF WBC: CPT | Mod: ZL | Performed by: NURSE PRACTITIONER

## 2021-09-03 PROCEDURE — G0463 HOSPITAL OUTPT CLINIC VISIT: HCPCS

## 2021-09-03 PROCEDURE — 82040 ASSAY OF SERUM ALBUMIN: CPT | Mod: ZL | Performed by: NURSE PRACTITIONER

## 2021-09-03 PROCEDURE — 83036 HEMOGLOBIN GLYCOSYLATED A1C: CPT | Mod: ZL | Performed by: NURSE PRACTITIONER

## 2021-09-03 PROCEDURE — 99214 OFFICE O/P EST MOD 30 MIN: CPT | Performed by: NURSE PRACTITIONER

## 2021-09-03 PROCEDURE — 84443 ASSAY THYROID STIM HORMONE: CPT | Mod: ZL | Performed by: NURSE PRACTITIONER

## 2021-09-03 PROCEDURE — 82043 UR ALBUMIN QUANTITATIVE: CPT | Mod: ZL | Performed by: NURSE PRACTITIONER

## 2021-09-03 RX ORDER — LOSARTAN POTASSIUM 100 MG/1
TABLET ORAL
Qty: 90 TABLET | Refills: 1 | Status: SHIPPED | OUTPATIENT
Start: 2021-09-03 | End: 2022-03-04

## 2021-09-03 RX ORDER — METOPROLOL TARTRATE 50 MG
TABLET ORAL
Qty: 180 TABLET | Refills: 1 | Status: SHIPPED | OUTPATIENT
Start: 2021-09-03 | End: 2022-03-04

## 2021-09-03 RX ORDER — CITALOPRAM HYDROBROMIDE 20 MG/1
20 TABLET ORAL DAILY
Qty: 90 TABLET | Refills: 1 | Status: SHIPPED | OUTPATIENT
Start: 2021-09-03 | End: 2022-01-10

## 2021-09-03 RX ORDER — AMLODIPINE BESYLATE 10 MG/1
10 TABLET ORAL DAILY
Qty: 90 TABLET | Refills: 1 | Status: SHIPPED | OUTPATIENT
Start: 2021-09-03 | End: 2022-03-04

## 2021-09-03 RX ORDER — METFORMIN HCL 500 MG
500 TABLET, EXTENDED RELEASE 24 HR ORAL 2 TIMES DAILY WITH MEALS
Qty: 180 TABLET | Refills: 1 | Status: SHIPPED | OUTPATIENT
Start: 2021-09-03 | End: 2021-12-11

## 2021-09-03 ASSESSMENT — ANXIETY QUESTIONNAIRES
4. TROUBLE RELAXING: SEVERAL DAYS
GAD7 TOTAL SCORE: 5
IF YOU CHECKED OFF ANY PROBLEMS ON THIS QUESTIONNAIRE, HOW DIFFICULT HAVE THESE PROBLEMS MADE IT FOR YOU TO DO YOUR WORK, TAKE CARE OF THINGS AT HOME, OR GET ALONG WITH OTHER PEOPLE: SOMEWHAT DIFFICULT
1. FEELING NERVOUS, ANXIOUS, OR ON EDGE: SEVERAL DAYS
2. NOT BEING ABLE TO STOP OR CONTROL WORRYING: SEVERAL DAYS
5. BEING SO RESTLESS THAT IT IS HARD TO SIT STILL: NOT AT ALL
7. FEELING AFRAID AS IF SOMETHING AWFUL MIGHT HAPPEN: NOT AT ALL
3. WORRYING TOO MUCH ABOUT DIFFERENT THINGS: SEVERAL DAYS
6. BECOMING EASILY ANNOYED OR IRRITABLE: SEVERAL DAYS

## 2021-09-03 ASSESSMENT — PATIENT HEALTH QUESTIONNAIRE - PHQ9: SUM OF ALL RESPONSES TO PHQ QUESTIONS 1-9: 14

## 2021-09-03 ASSESSMENT — PAIN SCALES - GENERAL: PAINLEVEL: NO PAIN (0)

## 2021-09-03 NOTE — PATIENT INSTRUCTIONS
Assessment & Plan       Type 2 diabetes mellitus without complication, without long-term current use of insulin (H)  - Hemoglobin A1c  - Comprehensive metabolic panel  - TSH with free T4 reflex  - CBC with platelets and differential  - Albumin Random Urine Quantitative with Creat Ratio  - metFORMIN (GLUCOPHAGE-XR) 500 MG 24 hr tablet; Take 1 tablet (500 mg) by mouth 2 times daily (with meals)    Hyperlipidemia LDL goal <100  - Zocor as prescribed  - Comprehensive metabolic panel  - Lipid Profile (Chol, Trig, HDL, LDL calc)    Benign essential hypertension  - Comprehensive metabolic panel  - TSH with free T4 reflex  - amLODIPine (NORVASC) 10 MG tablet; Take 1 tablet (10 mg) by mouth daily  - metoprolol tartrate (LOPRESSOR) 50 MG tablet; TAKE 1 TABLET BY MOUTH TWICE A DAY FOR HEART AND BLOOD PRESSURE.  - losartan (COZAAR) 100 MG tablet; TAKE 1 TABLET (100 MG) BY MOUTH DAILY FOR BLOOD PRESSURE    Current mild episode of major depressive disorder without prior episode (H)  - citalopram (CELEXA) 20 MG tablet; Take 1 tablet (20 mg) by mouth daily - dose incrase    Menopause present  - DX Hip/Pelvis/Spine; Future        Return in about 6 months (around 3/3/2022) for Chronic disease management, am fasting.      Ana Valdez CNP  [facility] [place] [facility]

## 2021-09-03 NOTE — NURSING NOTE
"Chief Complaint   Patient presents with     Chronic Disease Management       Initial /62 (BP Location: Left arm, Patient Position: Sitting, Cuff Size: Adult Regular)   Pulse 63   Temp 97.9  F (36.6  C) (Tympanic)   Resp 18   Wt 75.3 kg (165 lb 14.4 oz)   SpO2 93%   BMI 31.35 kg/m   Estimated body mass index is 31.35 kg/m  as calculated from the following:    Height as of 4/20/21: 1.549 m (5' 1\").    Weight as of this encounter: 75.3 kg (165 lb 14.4 oz).  Medication Reconciliation: complete  Agata Bob LPN  "

## 2021-09-03 NOTE — LETTER
My Depression Action Plan  Name: Aga Gary   Date of Birth 1950  Date: 9/3/2021    My doctor: Ana Valdez   My clinic: [facility]  [address]  [place] [address]  [phone]          GREEN    ZONE   Good Control    What it looks like:     Things are going generally well. You have normal ups and downs. You may even feel depressed from time to time, but bad moods usually last less than a day.   What you need to do:  1. Continue to care for yourself (see self care plan)  2. Check your depression survival kit and update it as needed  3. Follow your physician s recommendations including any medication.  4. Do not stop taking medication unless you consult with your physician first.           YELLOW         ZONE Getting Worse    What it looks like:     Depression is starting to interfere with your life.     It may be hard to get out of bed; you may be starting to isolate yourself from others.    Symptoms of depression are starting to last most all day and this has happened for several days.     You may have suicidal thoughts but they are not constant.   What you need to do:     1. Call your care team. Your response to treatment will improve if you keep your care team informed of your progress. Yellow periods are signs an adjustment may need to be made.     2. Continue your self-care.  Just get dressed and ready for the day.  Don't give yourself time to talk yourself out of it.    3. Talk to someone in your support network.    4. Open up your Depression Self-Care Plan/Wellness Kit.           RED    ZONE Medical Alert - Get Help    What it looks like:     Depression is seriously interfering with your life.     You may experience these or other symptoms: You can t get out of bed most days, can t work or engage in other necessary activities, you have trouble taking care of basic hygiene, or basic responsibilities, thoughts of suicide or death that will not  go away, self-injurious behavior.     What you need to do:  1. Call your care team and request a same-day appointment. If they are not available (weekends or after hours) call your local crisis line, emergency room or 911.          Depression Self-Care Plan / Wellness Kit    Many people find that medication and therapy are helpful treatments for managing depression. In addition, making small changes to your everyday life can help to boost your mood and improve your wellbeing. Below are some tips for you to consider. Be sure to talk with your medical provider and/or behavioral health consultant if your symptoms are worsening or not improving.     Sleep   Sleep hygiene  means all of the habits that support good, restful sleep. It includes maintaining a consistent bedtime and wake time, using your bedroom only for sleeping or sex, and keeping the bedroom dark and free of distractions like a computer, smartphone, or television.     Develop a Healthy Routine  Maintain good hygiene. Get out of bed in the morning, make your bed, brush your teeth, take a shower, and get dressed. Don t spend too much time viewing media that makes you feel stressed. Find time to relax each day.    Exercise  Get some form of exercise every day. This will help reduce pain and release endorphins, the  feel good  chemicals in your brain. It can be as simple as just going for a walk or doing some gardening, anything that will get you moving.      Diet  Strive to eat healthy foods, including fruits and vegetables. Drink plenty of water. Avoid excessive sugar, caffeine, alcohol, and other mood-altering substances.     Stay Connected with Others  Stay in touch with friends and family members.    Manage Your Mood  Try deep breathing, massage therapy, biofeedback, or meditation. Take part in fun activities when you can. Try to find something to smile about each day.     Psychotherapy  Be open to working with a therapist if your provider recommends it.      Medication  Be sure to take your medication as prescribed. Most anti-depressants need to be taken every day. It usually takes several weeks for medications to work. Not all medicines work for all people. It is important to follow-up with your provider to make sure you have a treatment plan that is working for you. Do not stop your medication abruptly without first discussing it with your provider.    Crisis Resources   These hotlines are for both adults and children. They and are open 24 hours a day, 7 days a week unless noted otherwise.      National Suicide Prevention Lifeline   6-903-742-TALK (4373)      Crisis Text Line    www.crisistextline.org  Text HOME to 925685 from anywhere in the [place], anytime, about any type of crisis. A live, trained crisis counselor will receive the text and respond quickly.      [place] Lifeline for LGBTQ Youth  A national crisis intervention and suicide lifeline for LGBTQ youth under 25. Provides a safe place to talk without judgement. Call 1-307.434.5385; text START to 822638 or visit www.thetrevorproject.org to talk to a trained counselor.      For [facility] crisis numbers, visit the [facility] website at:  https://[place].gov/dhs/people-we-serve/adults/health-care/mental-health/resources/crisis-contacts.jsp

## 2021-09-04 ASSESSMENT — ANXIETY QUESTIONNAIRES: GAD7 TOTAL SCORE: 5

## 2021-12-01 DIAGNOSIS — E78.5 HYPERLIPIDEMIA LDL GOAL <100: ICD-10-CM

## 2021-12-02 RX ORDER — SIMVASTATIN 40 MG
TABLET ORAL
Qty: 90 TABLET | Refills: 1 | Status: SHIPPED | OUTPATIENT
Start: 2021-12-02 | End: 2022-03-04

## 2021-12-10 DIAGNOSIS — E11.9 TYPE 2 DIABETES MELLITUS WITHOUT COMPLICATION, WITHOUT LONG-TERM CURRENT USE OF INSULIN (H): ICD-10-CM

## 2021-12-11 RX ORDER — METFORMIN HCL 500 MG
500 TABLET, EXTENDED RELEASE 24 HR ORAL 2 TIMES DAILY WITH MEALS
Qty: 180 TABLET | Refills: 1 | Status: SHIPPED | OUTPATIENT
Start: 2021-12-11 | End: 2022-03-04

## 2021-12-11 NOTE — TELEPHONE ENCOUNTER
Metformin      Last Written Prescription Date:  9/3/21  Last Fill Quantity: 180,   # refills: 1  Last Office Visit: 9/3/21  Future Office visit:    Next 5 appointments (look out 90 days)    Mar 04, 2022  2:45 PM  (Arrive by 2:30 PM)  SHORT with Ana Valdez CNP  [facility] ([facility] ) [address]  [facility] [address]  [phone]           Routing refill request to provider for review/approval because:

## 2022-01-10 DIAGNOSIS — F32.0 CURRENT MILD EPISODE OF MAJOR DEPRESSIVE DISORDER WITHOUT PRIOR EPISODE (H): ICD-10-CM

## 2022-01-10 RX ORDER — CITALOPRAM HYDROBROMIDE 20 MG/1
20 TABLET ORAL DAILY
Qty: 90 TABLET | Refills: 1 | Status: SHIPPED | OUTPATIENT
Start: 2022-01-10 | End: 2022-03-04

## 2022-03-03 NOTE — PROGRESS NOTES
Assessment & Plan        Type 2 diabetes mellitus without complication, without long-term current use of insulin (H)  - Hemoglobin A1c; Future  - Albumin Random Urine Quantitative with Creat Ratio; Future  - metFORMIN (GLUCOPHAGE-XR) 500 MG 24 hr tablet; Take 1 tablet (500 mg) by mouth 2 times daily (with meals)  - Hemoglobin A1c    Hyperlipidemia LDL goal <100  - simvastatin (ZOCOR) 40 MG tablet; Take 1 tablet (40 mg) by mouth At Bedtime  - Lipid Profile (Chol, Trig, HDL, LDL calc)  - Comprehensive metabolic panel    Benign essential hypertension  - metoprolol tartrate (LOPRESSOR) 50 MG tablet; TAKE 1 TABLET BY MOUTH TWICE A DAY FOR HEART AND BLOOD PRESSURE.  - losartan (COZAAR) 100 MG tablet; TAKE 1 TABLET (100 MG) BY MOUTH DAILY FOR BLOOD PRESSURE  - amLODIPine (NORVASC) 10 MG tablet; Take 1 tablet (10 mg) by mouth daily  - Comprehensive metabolic panel    Thyroid nodule  - TSH with free T4 reflex    Current mild episode of major depressive disorder without prior episode (H)  - citalopram (CELEXA) 20 MG tablet; Take 1 tablet (20 mg) by mouth daily    Personal history of tobacco use  - Prof fee: Shared Decisionmaking for Lung Cancer Screening  - CT Chest Lung Cancer Scrn Low Dose wo; Future    Encounter for screening mammogram for malignant neoplasm of breast  - MA SCREENING DIGITAL BILATERAL ([place]); Future      Return in about 6 months (around 9/4/2022).      Ana Valdez CNP  [facility] - BUNNY Martin is a 71 year old who presents for the following health issues         Diabetes Follow-up    How often are you checking your blood sugar? Not at all    What concerns do you have today about your diabetes? None     Do you have any of these symptoms? (Select all that apply)  Numbness in feet and Burning in feet    Have you had a diabetic eye exam in the last 12 months? No      Hyperlipidemia Follow-Up    Are you regularly taking any medication or supplement to lower your cholesterol?   Yes-  simvastatin 40mg daily    Are you having muscle aches or other side effects that you think could be caused by your cholesterol lowering medication?  No      Hypertension Follow-up    Do you check your blood pressure regularly outside of the clinic? no     Are you following a low salt diet? Yes    Are your blood pressures ever more than 140 on the top number (systolic) OR more   than 90 on the bottom number (diastolic), for example 140/90? No         BP Readings from Last 2 Encounters:   03/04/22 102/58   09/03/21 112/62     Hemoglobin A1C POCT (%)   Date Value   03/01/2021 5.6   08/28/2020 5.9 (H)     Hemoglobin A1C (%)   Date Value   09/03/2021 5.6     LDL Cholesterol Calculated (mg/dL)   Date Value   09/03/2021 74   03/01/2021 82   08/28/2020 85         Depression Followup    How are you doing with your depression since your last visit? Improved     Are you having other symptoms that might be associated with depression? No    Have you had a significant life event?  No     Are you feeling anxious or having panic attacks?   No    Do you have any concerns with your use of alcohol or other drugs? No    Social History     Tobacco Use     Smoking status: Heavy Tobacco Smoker     Packs/day: 1.00     Years: 50.00     Pack years: 50.00     Types: Cigarettes     Start date: 1/1/1972     Smokeless tobacco: Never Used     Tobacco comment: pt is slowly cutting down- 1 pack a day    Substance Use Topics     Alcohol use: No     Drug use: No         PHQ 3/1/2021 9/3/2021 3/4/2022   PHQ-9 Total Score 16 14 1   Q9: Thoughts of better off dead/self-harm past 2 weeks Not at all Not at all Not at all         ORTEGA-7 SCORE 1/3/2020 3/1/2021 9/3/2021   Total Score 0 15 5         Patient Active Problem List   Diagnosis     Benign essential hypertension     Tobacco abuse     Hyperlipidemia LDL goal <100     Taking a statin medication     Morbid obesity (H)     Calculus of kidney     Type 2 diabetes mellitus without complication, without  long-term current use of insulin (H)     Current mild episode of major depressive disorder without prior episode (H)     RIGHT Thyroid nodule     History of partial left thyroid lobectomy     Past Surgical History:   Procedure Laterality Date     ABDOMEN SURGERY      2 c-sections     CHOLECYSTECTOMY  1994     GYN SURGERY      total hyst,, no cervix     HEAD & NECK SURGERY      tumor neck, benign     ORTHOPEDIC SURGERY Bilateral     carpul tunnel      ZZHC REMOVAL OF NAIL PLATE SIMPLE SINGLE  04/19/2018    removal of 1/4  left gt toenail, local        Social History     Tobacco Use     Smoking status: Heavy Tobacco Smoker     Packs/day: 1.00     Years: 50.00     Pack years: 50.00     Types: Cigarettes     Start date: 1/1/1972     Smokeless tobacco: Never Used     Tobacco comment: pt is slowly cutting down- 1 pack a day    Substance Use Topics     Alcohol use: No     Family History   Problem Relation Age of Onset     Other Cancer Mother      Other Cancer Brother      Breast Cancer Paternal Aunt              Current Outpatient Medications   Medication Sig Dispense Refill     amLODIPine (NORVASC) 10 MG tablet Take 1 tablet (10 mg) by mouth daily 90 tablet 1     aspirin 81 MG EC tablet Take 1 tablet (81 mg) by mouth daily 90 tablet 3     capsaicin (ZOSTRIX) 0.025 % external cream Apply 1 g topically 3 times daily 120 g 3     cholecalciferol (VITAMIN D3) 5000 units TABS tablet Take 1 tablet (5,000 Units) by mouth daily 90 tablet 11     citalopram (CELEXA) 20 MG tablet TAKE 1 TABLET (20 MG) BY MOUTH DAILY 90 tablet 1     losartan (COZAAR) 100 MG tablet TAKE 1 TABLET (100 MG) BY MOUTH DAILY FOR BLOOD PRESSURE 90 tablet 1     metFORMIN (GLUCOPHAGE-XR) 500 MG 24 hr tablet TAKE 1 TABLET (500 MG) BY MOUTH 2 TIMES DAILY (WITH MEALS) 180 tablet 1     metoprolol tartrate (LOPRESSOR) 50 MG tablet TAKE 1 TABLET BY MOUTH TWICE A DAY FOR HEART AND BLOOD PRESSURE. 180 tablet 1     Multiple Vitamins-Iron (DAILY MULTIPLE VITAMIN/IRON)  TABS Take 1 tablet by mouth daily 90 tablet 11     nystatin (MYCOSTATIN) 581135 UNIT/GM external cream Apply topically 2 times daily 60 g 1     omega 3 1000 MG CAPS 3 po daily (Patient taking differently: 1 capsule daily) 90 capsule 11     simvastatin (ZOCOR) 40 MG tablet TAKE 1 TABLET (40MG) BY MOUTH ONCE DAILY. 90 tablet 1       No Known Allergies       Recent Labs   Lab Test 09/03/21  1408 03/01/21  1322 08/28/20  1330   A1C 5.6 5.6 5.9*   LDL 74 82 85   HDL 43* 49* 42*   TRIG 152* 130 150*   ALT 22 27 25   CR 0.77 0.78 0.72   GFRESTIMATED 79 78 86   GFRESTBLACK  --  >90 >90   POTASSIUM 3.8 4.1 4.1   TSH 3.61 4.00 2.86          BP Readings from Last 3 Encounters:   03/04/22 102/58   09/03/21 112/62   05/05/21 121/78    Wt Readings from Last 3 Encounters:   03/04/22 72.4 kg (159 lb 9.6 oz)   09/03/21 75.3 kg (165 lb 14.4 oz)   04/20/21 78.9 kg (174 lb)              Review of Systems   Constitutional, HEENT, cardiovascular, pulmonary, GI, , musculoskeletal, neuro, skin, endocrine and psych systems are negative, except as otherwise noted.          Objective    /58 (BP Location: Right arm, Patient Position: Sitting, Cuff Size: Adult Regular)   Pulse 61   Temp 97.8  F (36.6  C) (Tympanic)   Resp 18   Wt 72.4 kg (159 lb 9.6 oz)   SpO2 93%   BMI 30.16 kg/m    Body mass index is 30.16 kg/m .       Physical Exam   GENERAL: healthy, alert and no distress  EYES: Eyes grossly normal to inspection, PERRL and conjunctivae and sclerae normal  HENT: ear canals and TM's normal, nose and mouth without ulcers or lesions  NECK: no adenopathy, no asymmetry, masses, or scars and thyroid normal to palpation  RESP: lungs clear to auscultation - no rales, rhonchi or wheezes  CV: regular rate and rhythm, normal S1 S2, no S3 or S4, no murmur, click or rub, no peripheral edema and peripheral pulses strong  MS: no gross musculoskeletal defects noted, no edema  SKIN: no suspicious lesions or rashes  PSYCH: mentation appears  normal, affect normal/bright            Lung Cancer Screening Shared Decision Making Visit     Aga Gary is eligible for lung cancer screening on the basis of the information provided in my signed lung cancer screening order.     I have discussed with patient the risks and benefits of screening for lung cancer with low-dose CT.     The risks include:  radiation exposure: one low dose chest CT has as much ionizing radiation as about 15 chest x-rays or 6 months of background radiation living in [place]    false positives: 96% of positive findings/nodules are NOT cancer, but some might still require additional diagnostic evaluation, including biopsy  over-diagnosis: some slow growing cancers that might never have been clinically significant will be detected and treated unnecessarily     The benefit of early detection of lung cancer is contingent upon adherence to annual screening or more frequent follow up if indicated.     Furthermore, reaping the benefits of screening requires Aga Gary to be willing and physically able to undergo diagnostic procedures, if indicated. Although no specific guide is available for determining severity of comorbidities, it is reasonable to withhold screening in patients who have greater mortality risk from other diseases.     We did discuss that the only way to prevent lung cancer is to not smoke. Smoking cessation counseling was given, duration < 3 minutes.      I did offer risk estimation using a calculator such as this one:    ShouldIScreen

## 2022-03-04 ENCOUNTER — OFFICE VISIT (OUTPATIENT)
Dept: FAMILY MEDICINE | Facility: OTHER | Age: 72
End: 2022-03-04
Attending: NURSE PRACTITIONER
Payer: COMMERCIAL

## 2022-03-04 VITALS
WEIGHT: 159.6 LBS | BODY MASS INDEX: 30.16 KG/M2 | HEART RATE: 61 BPM | SYSTOLIC BLOOD PRESSURE: 102 MMHG | RESPIRATION RATE: 18 BRPM | TEMPERATURE: 97.8 F | DIASTOLIC BLOOD PRESSURE: 58 MMHG | OXYGEN SATURATION: 93 %

## 2022-03-04 DIAGNOSIS — F32.0 CURRENT MILD EPISODE OF MAJOR DEPRESSIVE DISORDER WITHOUT PRIOR EPISODE (H): ICD-10-CM

## 2022-03-04 DIAGNOSIS — E04.1 THYROID NODULE: ICD-10-CM

## 2022-03-04 DIAGNOSIS — Z12.31 ENCOUNTER FOR SCREENING MAMMOGRAM FOR MALIGNANT NEOPLASM OF BREAST: ICD-10-CM

## 2022-03-04 DIAGNOSIS — E78.5 HYPERLIPIDEMIA LDL GOAL <100: ICD-10-CM

## 2022-03-04 DIAGNOSIS — Z87.891 PERSONAL HISTORY OF TOBACCO USE: ICD-10-CM

## 2022-03-04 DIAGNOSIS — I10 BENIGN ESSENTIAL HYPERTENSION: ICD-10-CM

## 2022-03-04 DIAGNOSIS — R73.03 PREDIABETES: Primary | ICD-10-CM

## 2022-03-04 LAB
ALBUMIN SERPL-MCNC: 3.9 G/DL (ref 3.4–5)
ALBUMIN UR-MCNC: NEGATIVE MG/DL
ALP SERPL-CCNC: 59 U/L (ref 40–150)
ALT SERPL W P-5'-P-CCNC: 22 U/L (ref 0–50)
ANION GAP SERPL CALCULATED.3IONS-SCNC: 7 MMOL/L (ref 3–14)
APPEARANCE UR: CLEAR
AST SERPL W P-5'-P-CCNC: 11 U/L (ref 0–45)
BILIRUB SERPL-MCNC: 0.7 MG/DL (ref 0.2–1.3)
BILIRUB UR QL STRIP: NEGATIVE
BUN SERPL-MCNC: 21 MG/DL (ref 7–30)
CALCIUM SERPL-MCNC: 9.5 MG/DL (ref 8.5–10.1)
CHLORIDE BLD-SCNC: 107 MMOL/L (ref 94–109)
CHOLEST SERPL-MCNC: 135 MG/DL
CO2 SERPL-SCNC: 24 MMOL/L (ref 20–32)
COLOR UR AUTO: YELLOW
CREAT SERPL-MCNC: 0.75 MG/DL (ref 0.52–1.04)
CREAT UR-MCNC: 60 MG/DL
EST. AVERAGE GLUCOSE BLD GHB EST-MCNC: 111 MG/DL
FASTING STATUS PATIENT QL REPORTED: YES
GFR SERPL CREATININE-BSD FRML MDRD: 85 ML/MIN/1.73M2
GLUCOSE BLD-MCNC: 116 MG/DL (ref 70–99)
GLUCOSE UR STRIP-MCNC: NEGATIVE MG/DL
HBA1C MFR BLD: 5.5 % (ref 0–5.6)
HDLC SERPL-MCNC: 45 MG/DL
HGB UR QL STRIP: NEGATIVE
KETONES UR STRIP-MCNC: NEGATIVE MG/DL
LDLC SERPL CALC-MCNC: 73 MG/DL
LEUKOCYTE ESTERASE UR QL STRIP: NEGATIVE
MICROALBUMIN UR-MCNC: <5 MG/L
MICROALBUMIN/CREAT UR: NORMAL MG/G{CREAT}
NITRATE UR QL: NEGATIVE
NONHDLC SERPL-MCNC: 90 MG/DL
PH UR STRIP: 6 [PH] (ref 5–7)
POTASSIUM BLD-SCNC: 3.9 MMOL/L (ref 3.4–5.3)
PROT SERPL-MCNC: 8.2 G/DL (ref 6.8–8.8)
SODIUM SERPL-SCNC: 138 MMOL/L (ref 133–144)
SP GR UR STRIP: <=1.005 (ref 1–1.03)
T4 FREE SERPL-MCNC: 1 NG/DL (ref 0.76–1.46)
TRIGL SERPL-MCNC: 87 MG/DL
TSH SERPL DL<=0.005 MIU/L-ACNC: 5.12 MU/L (ref 0.4–4)
UROBILINOGEN UR STRIP-ACNC: 0.2 E.U./DL

## 2022-03-04 PROCEDURE — 82043 UR ALBUMIN QUANTITATIVE: CPT | Mod: ZL | Performed by: NURSE PRACTITIONER

## 2022-03-04 PROCEDURE — 84439 ASSAY OF FREE THYROXINE: CPT | Mod: ZL | Performed by: NURSE PRACTITIONER

## 2022-03-04 PROCEDURE — G0296 VISIT TO DETERM LDCT ELIG: HCPCS | Performed by: NURSE PRACTITIONER

## 2022-03-04 PROCEDURE — 80053 COMPREHEN METABOLIC PANEL: CPT | Mod: ZL | Performed by: NURSE PRACTITIONER

## 2022-03-04 PROCEDURE — 81003 URINALYSIS AUTO W/O SCOPE: CPT | Mod: ZL | Performed by: NURSE PRACTITIONER

## 2022-03-04 PROCEDURE — 84443 ASSAY THYROID STIM HORMONE: CPT | Mod: ZL | Performed by: NURSE PRACTITIONER

## 2022-03-04 PROCEDURE — 80061 LIPID PANEL: CPT | Mod: ZL | Performed by: NURSE PRACTITIONER

## 2022-03-04 PROCEDURE — G0463 HOSPITAL OUTPT CLINIC VISIT: HCPCS | Mod: 25

## 2022-03-04 PROCEDURE — 99214 OFFICE O/P EST MOD 30 MIN: CPT | Performed by: NURSE PRACTITIONER

## 2022-03-04 PROCEDURE — 36415 COLL VENOUS BLD VENIPUNCTURE: CPT | Mod: ZL | Performed by: NURSE PRACTITIONER

## 2022-03-04 PROCEDURE — G0463 HOSPITAL OUTPT CLINIC VISIT: HCPCS

## 2022-03-04 PROCEDURE — 82040 ASSAY OF SERUM ALBUMIN: CPT | Mod: ZL | Performed by: NURSE PRACTITIONER

## 2022-03-04 PROCEDURE — 83036 HEMOGLOBIN GLYCOSYLATED A1C: CPT | Mod: ZL | Performed by: NURSE PRACTITIONER

## 2022-03-04 RX ORDER — AMLODIPINE BESYLATE 10 MG/1
10 TABLET ORAL DAILY
Qty: 90 TABLET | Refills: 1 | Status: SHIPPED | OUTPATIENT
Start: 2022-03-04 | End: 2022-04-29

## 2022-03-04 RX ORDER — CITALOPRAM HYDROBROMIDE 20 MG/1
20 TABLET ORAL DAILY
Qty: 90 TABLET | Refills: 1 | Status: SHIPPED | OUTPATIENT
Start: 2022-03-04 | End: 2022-08-10

## 2022-03-04 RX ORDER — METFORMIN HCL 500 MG
500 TABLET, EXTENDED RELEASE 24 HR ORAL 2 TIMES DAILY WITH MEALS
Qty: 180 TABLET | Refills: 1 | Status: SHIPPED | OUTPATIENT
Start: 2022-03-04 | End: 2022-06-12

## 2022-03-04 RX ORDER — SIMVASTATIN 40 MG
40 TABLET ORAL AT BEDTIME
Qty: 90 TABLET | Refills: 1 | Status: SHIPPED | OUTPATIENT
Start: 2022-03-04 | End: 2022-06-24

## 2022-03-04 RX ORDER — LOSARTAN POTASSIUM 100 MG/1
TABLET ORAL
Qty: 90 TABLET | Refills: 1 | Status: SHIPPED | OUTPATIENT
Start: 2022-03-04 | End: 2022-04-29

## 2022-03-04 RX ORDER — METOPROLOL TARTRATE 50 MG
TABLET ORAL
Qty: 180 TABLET | Refills: 1 | Status: SHIPPED | OUTPATIENT
Start: 2022-03-04 | End: 2022-04-29

## 2022-03-04 ASSESSMENT — ANXIETY QUESTIONNAIRES
6. BECOMING EASILY ANNOYED OR IRRITABLE: NOT AT ALL
7. FEELING AFRAID AS IF SOMETHING AWFUL MIGHT HAPPEN: NOT AT ALL
2. NOT BEING ABLE TO STOP OR CONTROL WORRYING: SEVERAL DAYS
5. BEING SO RESTLESS THAT IT IS HARD TO SIT STILL: NOT AT ALL
IF YOU CHECKED OFF ANY PROBLEMS ON THIS QUESTIONNAIRE, HOW DIFFICULT HAVE THESE PROBLEMS MADE IT FOR YOU TO DO YOUR WORK, TAKE CARE OF THINGS AT HOME, OR GET ALONG WITH OTHER PEOPLE: NOT DIFFICULT AT ALL
4. TROUBLE RELAXING: NOT AT ALL
3. WORRYING TOO MUCH ABOUT DIFFERENT THINGS: NOT AT ALL
GAD7 TOTAL SCORE: 1
1. FEELING NERVOUS, ANXIOUS, OR ON EDGE: NOT AT ALL

## 2022-03-04 ASSESSMENT — PATIENT HEALTH QUESTIONNAIRE - PHQ9: SUM OF ALL RESPONSES TO PHQ QUESTIONS 1-9: 1

## 2022-03-04 ASSESSMENT — PAIN SCALES - GENERAL: PAINLEVEL: NO PAIN (0)

## 2022-03-04 NOTE — NURSING NOTE
"Chief Complaint   Patient presents with     Chronic Disease Management       Initial /58 (BP Location: Right arm, Patient Position: Sitting, Cuff Size: Adult Regular)   Pulse 61   Temp 97.8  F (36.6  C) (Tympanic)   Resp 18   Wt 72.4 kg (159 lb 9.6 oz)   SpO2 93%   BMI 30.16 kg/m   Estimated body mass index is 30.16 kg/m  as calculated from the following:    Height as of 4/20/21: 1.549 m (5' 1\").    Weight as of this encounter: 72.4 kg (159 lb 9.6 oz).  Medication Reconciliation: complete  Agata Bob LPN  "

## 2022-03-04 NOTE — PATIENT INSTRUCTIONS
Assessment & Plan        Type 2 diabetes mellitus without complication, without long-term current use of insulin (H)  - Hemoglobin A1c; Future  - Albumin Random Urine Quantitative with Creat Ratio; Future  - metFORMIN (GLUCOPHAGE-XR) 500 MG 24 hr tablet; Take 1 tablet (500 mg) by mouth 2 times daily (with meals)  - Hemoglobin A1c    Hyperlipidemia LDL goal <100  - simvastatin (ZOCOR) 40 MG tablet; Take 1 tablet (40 mg) by mouth At Bedtime  - Lipid Profile (Chol, Trig, HDL, LDL calc)  - Comprehensive metabolic panel    Benign essential hypertension  - metoprolol tartrate (LOPRESSOR) 50 MG tablet; TAKE 1 TABLET BY MOUTH TWICE A DAY FOR HEART AND BLOOD PRESSURE.  - losartan (COZAAR) 100 MG tablet; TAKE 1 TABLET (100 MG) BY MOUTH DAILY FOR BLOOD PRESSURE  - amLODIPine (NORVASC) 10 MG tablet; Take 1 tablet (10 mg) by mouth daily  - Comprehensive metabolic panel    Thyroid nodule  - TSH with free T4 reflex    Current mild episode of major depressive disorder without prior episode (H)  - citalopram (CELEXA) 20 MG tablet; Take 1 tablet (20 mg) by mouth daily    Personal history of tobacco use  - Prof fee: Shared Decisionmaking for Lung Cancer Screening  - CT Chest Lung Cancer Scrn Low Dose wo; Future    Encounter for screening mammogram for malignant neoplasm of breast  - MA SCREENING DIGITAL BILATERAL ([place]); Future      Return in about 6 months (around 9/4/2022).      Ana Valdez CNP  [facility] - [facility]        Lung Cancer Screening   Frequently Asked Questions  If you are at high-risk for lung cancer, getting screened with low-dose computed tomography (LDCT) every year can help save your life. This handout offers answers to some of the most common questions about lung cancer screening. If you have other questions, please call [phone] ([phone]).     What is it?  Lung cancer screening uses special X-ray technology to create an image of your lung tissue. The exam is quick and easy and  takes less than 10 seconds. We don t give you any medicine or use any needles. You can eat before and after the exam. You don t need to change your clothes as long as the clothing on your chest doesn t contain metal. But, you do need to be able to hold your breath for at least 6 seconds during the exam.    What is the goal of lung cancer screening?  The goal of lung cancer screening is to save lives. Many times, lung cancer is not found until a person starts having physical symptoms. Lung cancer screening can help detect lung cancer in the earliest stages when it may be easier to treat.    Who should be screened for lung cancer?  We suggest lung cancer screening for anyone who is at high-risk for lung cancer. You are in the high-risk group if you:      are between the ages of 55 and 79, and    have smoked at least 1 pack of cigarettes a day for 20 or more years, and    still smoke or have quit within the past 15 years.    However, if you have a new cough or shortness of breath, you should talk to your doctor before being screened.    Why does it matter if I have symptoms?  Certain symptoms can be a sign that you have a condition in your lungs that should be checked and treated by your doctor. These symptoms include fever, chest pain, a new or changing cough, shortness of breath that you have never felt before, coughing up blood or unexplained weight loss. Having any of these symptoms can greatly affect the results of lung cancer screening.       Should all smokers get an LDCT lung cancer screening exam?  It depends. Lung cancer screening is for a very specific group of men and women who have a history of heavy smoking over a long period of time (see  Who should be screened for lung cancer  above).  I am in the high-risk group, but have been diagnosed with cancer in the past. Is LDCT lung cancer screening right for me?  In some cases, you should not have LDCT lung screening, such as when your doctor is already  following your cancer with CT scan studies. Your doctor will help you decide if LDCT lung screening is right for you.  Do I need to have a screening exam every year?  Yes. If you are in the high-risk group described earlier, you should get an LDCT lung cancer screening exam every year until you are 79, or are no longer willing or able to undergo screening and possible procedures to diagnose and treat lung cancer.  How effective is LDCT at preventing death from lung cancer?  Studies have shown that LDCT lung cancer screening can lower the risk of death from lung cancer by 20 percent in people who are at high-risk.  What are the risks?  There are some risks and limitations of LDCT lung cancer screening. We want to make sure you understand the risks and benefits, so please let us know if you have any questions. Your doctor may want to talk with you more about these risks.    Radiation exposure: As with any exam that uses radiation, there is a very small increased risk of cancer. The amount of radiation in LDCT is small--about the same amount a person would get from a mammogram. Your doctor orders the exam when he or she feels the potential benefits outweigh the risks.    False negatives: No test is perfect, including LDCT. It is possible that you may have a medical condition, including lung cancer, that is not found during your exam. This is called a false negative result.    False positives and more testing: LDCT very often finds something in the lung that could be cancer, but in fact is not. This is called a false positive result. False positive tests often cause anxiety. To make sure these findings are not cancer, you may need to have more tests. These tests will be done only if you give us permission. Sometimes patients need a treatment that can have side effects, such as a biopsy. For more information on false positives, see  What can I expect from the results?     Findings not related to lung cancer: Your LDCT  exam also takes pictures of areas of your body next to your lungs. In a very small number of cases, the CT scan will show an abnormal finding in one of these areas, such as your kidneys, adrenal glands, liver or thyroid. This finding may not be serious, but you may need more tests. Your doctor can help you decide what other tests you may need, if any.  What can I expect from the results?  About 1 out of 4 LDCT exams will find something that may need more tests. Most of the time, these findings are lung nodules. Lung nodules are very small collections of tissue in the lung. These nodules are very common, and the vast majority--more than 97 percent--are not cancer (benign). Most are normal lymph nodes or small areas of scarring from past infections.  But, if a small lung nodule is found to be cancer, the cancer can be cured more than 90 percent of the time. To know if the nodule is cancer, we may need to get more images before your next yearly screening exam. If the nodule has suspicious features (for example, it is large, has an odd shape or grows over time), we will refer you to a specialist for further testing.  Will my doctor also get the results?  Yes. Your doctor will get a copy of your results.  Is it okay to keep smoking now that there s a cancer screening exam?  No. Tobacco is one of the strongest cancer-causing agents. It causes not only lung cancer, but other cancers and cardiovascular (heart) diseases as well. The damage caused by smoking builds over time. This means that the longer you smoke, the higher your risk of disease. While it is never too late to quit, the sooner you quit, the better.  Where can I find help to quit smoking?  The best way to prevent lung cancer is to stop smoking. If you have already quit smoking, congratulations and keep it up! For help on quitting smoking, please call [employer] at 8-723-QUITNOW (1-663.912.2154) or the American [employer] Society at 1-780.682.6757 to find local  resources near you.  One-on-one health coaching:  If you d prefer to work individually with a health care provider on tobacco cessation, we offer:      Medication Therapy Management:  Our specially trained pharmacists work closely with you and your doctor to help you quit smoking.  Call [phone] or [phone] (toll free).

## 2022-03-05 ASSESSMENT — ANXIETY QUESTIONNAIRES: GAD7 TOTAL SCORE: 1

## 2022-04-27 DIAGNOSIS — I10 BENIGN ESSENTIAL HYPERTENSION: ICD-10-CM

## 2022-04-29 RX ORDER — METOPROLOL TARTRATE 50 MG
TABLET ORAL
Qty: 180 TABLET | Refills: 1 | Status: SHIPPED | OUTPATIENT
Start: 2022-04-29 | End: 2022-10-28

## 2022-04-29 RX ORDER — LOSARTAN POTASSIUM 100 MG/1
TABLET ORAL
Qty: 90 TABLET | Refills: 1 | Status: SHIPPED | OUTPATIENT
Start: 2022-04-29 | End: 2022-10-28

## 2022-04-29 RX ORDER — AMLODIPINE BESYLATE 10 MG/1
10 TABLET ORAL DAILY
Qty: 90 TABLET | Refills: 1 | Status: SHIPPED | OUTPATIENT
Start: 2022-04-29 | End: 2022-10-28

## 2022-06-10 DIAGNOSIS — R73.03 PREDIABETES: ICD-10-CM

## 2022-06-10 NOTE — TELEPHONE ENCOUNTER
Metformin      Last Written Prescription Date:  3/4/2022  Last Fill Quantity: 180,   # refills: 1  Last Office Visit: 3/4/2022  Future Office visit:    Next 5 appointments (look out 90 days)    Sep 07, 2022  1:45 PM  (Arrive by 1:30 PM)  SHORT with Ana Valdez CNP  [facility] ([facility] ) [address]  [facility] [id number]  [phone]

## 2022-06-12 RX ORDER — METFORMIN HCL 500 MG
500 TABLET, EXTENDED RELEASE 24 HR ORAL 2 TIMES DAILY WITH MEALS
Qty: 180 TABLET | Refills: 1 | Status: SHIPPED | OUTPATIENT
Start: 2022-06-12 | End: 2023-03-17

## 2022-06-23 DIAGNOSIS — E78.5 HYPERLIPIDEMIA LDL GOAL <100: ICD-10-CM

## 2022-06-24 RX ORDER — SIMVASTATIN 40 MG
TABLET ORAL
Qty: 90 TABLET | Refills: 1 | Status: SHIPPED | OUTPATIENT
Start: 2022-06-24 | End: 2023-01-06

## 2022-08-09 DIAGNOSIS — F32.0 CURRENT MILD EPISODE OF MAJOR DEPRESSIVE DISORDER WITHOUT PRIOR EPISODE (H): ICD-10-CM

## 2022-08-10 RX ORDER — CITALOPRAM HYDROBROMIDE 20 MG/1
20 TABLET ORAL DAILY
Qty: 90 TABLET | Refills: 0 | Status: SHIPPED | OUTPATIENT
Start: 2022-08-10 | End: 2023-04-14

## 2022-08-10 NOTE — TELEPHONE ENCOUNTER
citalopram (CELEXA) 20 MG tablet  Last Written Prescription Date: 3/4/22  Last Fill Quantity: 90 # of Refills: 1  Last Office Visit: 3/4/22

## 2022-09-07 ENCOUNTER — OFFICE VISIT (OUTPATIENT)
Dept: FAMILY MEDICINE | Facility: OTHER | Age: 72
End: 2022-09-07
Attending: NURSE PRACTITIONER
Payer: COMMERCIAL

## 2022-09-07 VITALS
DIASTOLIC BLOOD PRESSURE: 60 MMHG | HEART RATE: 63 BPM | BODY MASS INDEX: 30.84 KG/M2 | OXYGEN SATURATION: 92 % | SYSTOLIC BLOOD PRESSURE: 111 MMHG | RESPIRATION RATE: 18 BRPM | TEMPERATURE: 97.6 F | WEIGHT: 163.2 LBS

## 2022-09-07 DIAGNOSIS — E78.5 HYPERLIPIDEMIA LDL GOAL <100: Primary | ICD-10-CM

## 2022-09-07 DIAGNOSIS — E04.1 THYROID NODULE: ICD-10-CM

## 2022-09-07 DIAGNOSIS — R73.03 PREDIABETES: ICD-10-CM

## 2022-09-07 DIAGNOSIS — L57.0 ACTINIC KERATOSIS: ICD-10-CM

## 2022-09-07 DIAGNOSIS — Z23 NEED FOR PROPHYLACTIC VACCINATION AND INOCULATION AGAINST INFLUENZA: ICD-10-CM

## 2022-09-07 DIAGNOSIS — Z72.0 TOBACCO ABUSE: ICD-10-CM

## 2022-09-07 DIAGNOSIS — I10 BENIGN ESSENTIAL HYPERTENSION: ICD-10-CM

## 2022-09-07 DIAGNOSIS — F32.0 CURRENT MILD EPISODE OF MAJOR DEPRESSIVE DISORDER WITHOUT PRIOR EPISODE (H): ICD-10-CM

## 2022-09-07 LAB
ALBUMIN SERPL-MCNC: 3.8 G/DL (ref 3.4–5)
ALBUMIN UR-MCNC: NEGATIVE MG/DL
ALP SERPL-CCNC: 65 U/L (ref 40–150)
ALT SERPL W P-5'-P-CCNC: 29 U/L (ref 0–50)
ANION GAP SERPL CALCULATED.3IONS-SCNC: 6 MMOL/L (ref 3–14)
APPEARANCE UR: CLEAR
AST SERPL W P-5'-P-CCNC: 22 U/L (ref 0–45)
BILIRUB SERPL-MCNC: 0.7 MG/DL (ref 0.2–1.3)
BILIRUB UR QL STRIP: NEGATIVE
BUN SERPL-MCNC: 23 MG/DL (ref 7–30)
CALCIUM SERPL-MCNC: 9.1 MG/DL (ref 8.5–10.1)
CHLORIDE BLD-SCNC: 108 MMOL/L (ref 94–109)
CHOLEST SERPL-MCNC: 134 MG/DL
CO2 SERPL-SCNC: 23 MMOL/L (ref 20–32)
COLOR UR AUTO: YELLOW
CREAT SERPL-MCNC: 0.66 MG/DL (ref 0.52–1.04)
CREAT UR-MCNC: 28 MG/DL
EST. AVERAGE GLUCOSE BLD GHB EST-MCNC: 117 MG/DL
FASTING STATUS PATIENT QL REPORTED: NO
GFR SERPL CREATININE-BSD FRML MDRD: >90 ML/MIN/1.73M2
GLUCOSE BLD-MCNC: 115 MG/DL (ref 70–99)
GLUCOSE UR STRIP-MCNC: NEGATIVE MG/DL
HBA1C MFR BLD: 5.7 % (ref 0–5.6)
HDLC SERPL-MCNC: 50 MG/DL
HGB UR QL STRIP: ABNORMAL
KETONES UR STRIP-MCNC: NEGATIVE MG/DL
LDLC SERPL CALC-MCNC: 63 MG/DL
LEUKOCYTE ESTERASE UR QL STRIP: NEGATIVE
MICROALBUMIN UR-MCNC: <5 MG/L
MICROALBUMIN/CREAT UR: NORMAL MG/G{CREAT}
NITRATE UR QL: NEGATIVE
NONHDLC SERPL-MCNC: 84 MG/DL
PH UR STRIP: 6 [PH] (ref 5–7)
POTASSIUM BLD-SCNC: 4.2 MMOL/L (ref 3.4–5.3)
PROT SERPL-MCNC: 8 G/DL (ref 6.8–8.8)
RBC #/AREA URNS AUTO: ABNORMAL /HPF
SODIUM SERPL-SCNC: 137 MMOL/L (ref 133–144)
SP GR UR STRIP: <=1.005 (ref 1–1.03)
SQUAMOUS #/AREA URNS AUTO: ABNORMAL /LPF
TRIGL SERPL-MCNC: 106 MG/DL
TSH SERPL DL<=0.005 MIU/L-ACNC: 3.01 MU/L (ref 0.4–4)
UROBILINOGEN UR STRIP-ACNC: 0.2 E.U./DL
WBC #/AREA URNS AUTO: ABNORMAL /HPF

## 2022-09-07 PROCEDURE — 83036 HEMOGLOBIN GLYCOSYLATED A1C: CPT | Mod: ZL | Performed by: NURSE PRACTITIONER

## 2022-09-07 PROCEDURE — G0463 HOSPITAL OUTPT CLINIC VISIT: HCPCS

## 2022-09-07 PROCEDURE — 84443 ASSAY THYROID STIM HORMONE: CPT | Mod: ZL | Performed by: NURSE PRACTITIONER

## 2022-09-07 PROCEDURE — 82043 UR ALBUMIN QUANTITATIVE: CPT | Mod: ZL | Performed by: NURSE PRACTITIONER

## 2022-09-07 PROCEDURE — G0463 HOSPITAL OUTPT CLINIC VISIT: HCPCS | Mod: 25

## 2022-09-07 PROCEDURE — 99214 OFFICE O/P EST MOD 30 MIN: CPT | Performed by: NURSE PRACTITIONER

## 2022-09-07 PROCEDURE — 36415 COLL VENOUS BLD VENIPUNCTURE: CPT | Mod: ZL | Performed by: NURSE PRACTITIONER

## 2022-09-07 PROCEDURE — G0008 ADMIN INFLUENZA VIRUS VAC: HCPCS

## 2022-09-07 PROCEDURE — 81001 URINALYSIS AUTO W/SCOPE: CPT | Mod: ZL | Performed by: NURSE PRACTITIONER

## 2022-09-07 PROCEDURE — 80061 LIPID PANEL: CPT | Mod: ZL | Performed by: NURSE PRACTITIONER

## 2022-09-07 PROCEDURE — 80053 COMPREHEN METABOLIC PANEL: CPT | Mod: ZL | Performed by: NURSE PRACTITIONER

## 2022-09-07 RX ORDER — NICOTINE 21 MG/24HR
1 PATCH, TRANSDERMAL 24 HOURS TRANSDERMAL EVERY 24 HOURS
Qty: 30 PATCH | Refills: 0 | Status: SHIPPED | OUTPATIENT
Start: 2022-09-07 | End: 2023-05-01

## 2022-09-07 ASSESSMENT — ANXIETY QUESTIONNAIRES
2. NOT BEING ABLE TO STOP OR CONTROL WORRYING: NOT AT ALL
GAD7 TOTAL SCORE: 0
1. FEELING NERVOUS, ANXIOUS, OR ON EDGE: NOT AT ALL
IF YOU CHECKED OFF ANY PROBLEMS ON THIS QUESTIONNAIRE, HOW DIFFICULT HAVE THESE PROBLEMS MADE IT FOR YOU TO DO YOUR WORK, TAKE CARE OF THINGS AT HOME, OR GET ALONG WITH OTHER PEOPLE: NOT DIFFICULT AT ALL
7. FEELING AFRAID AS IF SOMETHING AWFUL MIGHT HAPPEN: NOT AT ALL
GAD7 TOTAL SCORE: 0
6. BECOMING EASILY ANNOYED OR IRRITABLE: NOT AT ALL
3. WORRYING TOO MUCH ABOUT DIFFERENT THINGS: NOT AT ALL
5. BEING SO RESTLESS THAT IT IS HARD TO SIT STILL: NOT AT ALL

## 2022-09-07 ASSESSMENT — PATIENT HEALTH QUESTIONNAIRE - PHQ9
SUM OF ALL RESPONSES TO PHQ QUESTIONS 1-9: 2
5. POOR APPETITE OR OVEREATING: NOT AT ALL

## 2022-09-07 ASSESSMENT — PAIN SCALES - GENERAL: PAINLEVEL: NO PAIN (0)

## 2022-09-07 NOTE — Clinical Note
Hi! This pt saw you for a thyroid nodule  4/2021 and ultimately had a FNA.  It looks like she was to be referred, and she states this has not been done.  She didn't hear from anyone regarding FNA result. Can you pls look into this and have your staff reach out to her as to how to proceed?

## 2022-09-07 NOTE — PATIENT INSTRUCTIONS
Assessment & Plan         Type 2 diabetes mellitus without complication, without long-term current use of insulin (H)  - Comprehensive metabolic panel  - Hemoglobin A1c  - UA reflex to Microscopic and Culture - [facility]  - Albumin Random Urine Quantitative with Creat Ratio  - Urine Microscopic  - Please schedule your eye exam      Hyperlipidemia LDL goal <100  - Comprehensive metabolic panel  - Lipid Profile (Chol, Trig, HDL, LDL calc)  - TSH with free T4 reflex      Benign essential hypertension  - Comprehensive metabolic panel  - UA reflex to Microscopic and Culture - [facility]  - Urine Microscopic      Thyroid nodule  - TSH with free T4 reflex  - US ordered  - I messaged Dr Mcclellan to review your information      Current mild episode of major depressive disorder without prior episode (H)  - Continue plan of care      Need for prophylactic vaccination and inoculation against influenza  - INFLUENZA, QUAD, HIGH DOSE, PF, 65YR + (FLUZONE HD)  - ADMIN INFLUENZA (For MEDICARE Patients ONLY) []      Actinic Keratosis  - Dermatology referral placed      Continue all medication as directed         Nicotine/Tobacco Cessation:  She reports that she has been smoking cigarettes. She started smoking about 50 years ago. She has a 50.00 pack-year smoking history. She has never used smokeless tobacco.  Nicotine/Tobacco Cessation Plan:   She will try nicotine patches      Return in about 6 months (around 3/7/2023).      Ana Valdez, RENNY  [facility] - [facility]

## 2022-09-07 NOTE — PROGRESS NOTES
Assessment & Plan         Type 2 diabetes mellitus without complication, without long-term current use of insulin (H)  - Comprehensive metabolic panel  - Hemoglobin A1c  - UA reflex to Microscopic and Culture - [facility]  - Albumin Random Urine Quantitative with Creat Ratio  - Urine Microscopic      Hyperlipidemia LDL goal <100  - Comprehensive metabolic panel  - Lipid Profile (Chol, Trig, HDL, LDL calc)  - TSH with free T4 reflex      Benign essential hypertension  - Comprehensive metabolic panel  - UA reflex to Microscopic and Culture - [facility]  - Urine Microscopic      Thyroid nodule  - TSH with free T4 reflex  - US ordered  - I messaged Dr Mcclellan to review your information      Current mild episode of major depressive disorder without prior episode (H)  - Continue plan of care      Need for prophylactic vaccination and inoculation against influenza  - INFLUENZA, QUAD, HIGH DOSE, PF, 65YR + (FLUZONE HD)  - ADMIN INFLUENZA (For MEDICARE Patients ONLY) []      Actinic Keratosis  - Dermatology referral placed      Continue all medication as directed         Nicotine/Tobacco Cessation:  She reports that she has been smoking cigarettes. She started smoking about 50 years ago. She has a 50.00 pack-year smoking history. She has never used smokeless tobacco.  Nicotine/Tobacco Cessation Plan:   She will try nicotine patches      Return in about 6 months (around 3/7/2023).      Ana Valdez CNP  [facility] - BUNNY Martin is a 71 year old, presenting for the following health issues:  Chronic Disease Management and Imm/Inj (Flu Shot)          Diabetes Follow-up    How often are you checking your blood sugar? Not at all    What concerns do you have today about your diabetes? None     Do you have any of these symptoms? (Select all that apply)  Numbness in feet and Burning in feet    Have you had a diabetic eye exam in the last 12 months? No        Diabetic Foot Screen:  Any  complaints of increased pain or numbness ? No  Is there a foot ulcer now or a history of foot ulcer? No  Does the foot have an abnormal shape? No  Are the nails thick, too long or ingrown? No  Are there any redness or open areas? No        Hyperlipidemia Follow-Up    Are you regularly taking any medication or supplement to lower your cholesterol?   Yes- simvastatin 40 mg daily    Are you having muscle aches or other side effects that you think could be caused by your cholesterol lowering medication?  No      Hypertension Follow-up    Do you check your blood pressure regularly outside of the clinic? No     Are you following a low salt diet? No    Are your blood pressures ever more than 140 on the top number (systolic) OR more   than 90 on the bottom number (diastolic), for example 140/90? No        BP Readings from Last 2 Encounters:   09/07/22 111/60   03/04/22 102/58         Hemoglobin A1C (%)   Date Value   03/04/2022 5.5   09/03/2021 5.6   03/01/2021 5.6   08/28/2020 5.9 (H)     LDL Cholesterol Calculated (mg/dL)   Date Value   03/04/2022 73   09/03/2021 74   03/01/2021 82   08/28/2020 85         Depression Followup     How are you doing with your depression since your last visit? Improved     Are you having other symptoms that might be associated with depression? No    Have you had a significant life event?  No     Are you feeling anxious or having panic attacks?   No    Do you have any concerns with your use of alcohol or other drugs? No       Skin concern  Multiple skin lesions on her back  Some catch on clothing  No pruritis      Thyroid nodule  She saw Dr Mcclellan regarding this and had a FNA completed  I have reached out to Dr Mcclellan as to how to proceed        PROCEDURE: US THYROID 3/23/2021 4:19 PM     HISTORY: Thyroid nodule     COMPARISONS: None.     TECHNIQUE: Ultrasound of the thyroid     FINDINGS: The right lobe of the thyroid measures 5.9 x 2.4 x 2.9 cm.  There is a 3.2 cm diameter mass with  both cystic and solid components.     The left lobe of the thyroid measures 1.8 x 0.5 x 0.6 cm. Portion of  the left lobe is been removed.                                                                        IMPRESSION: Dominant right thyroid nodule with a central solid nodule.  Possibility of thyroid malignancy exists.     LILI MCKEON MD        PROCEDURE: US BIOPSY THYROID FINE NEEDLE ASPIRATION 5/5/2021 10:08 AM     HISTORY: Right 3.2cm Nodule; Thyroid nodule     COMPARISONS: None.     TECHNIQUE: The procedure was discussed with patient and informed  consent was obtained. Timeout procedure was followed.     Skin was cleansed with Betadine and local anesthesia was administered.  7 passes were made into the solid component of the right thyroid mass  using a 25-gauge needle and gentle aspiration. Specimens were given to  pathology and felt to be adequate.                                                                    IMPRESSION: Ultrasound guided FNA of right thyroid mass.     CINDY VERDE MD        Component 1 yr ago    Copath Report Patient Name: RAMONA WHITTEN   MR#: [id number]   Specimen #: [id number]   Collected: 5/5/2021   Received: 5/6/2021   Reported: 5/10/2021 17:36   Ordering Phy(s): CINDY VERDE   Additional Phy(s): KIANA CRUMP     For improved result formatting, select 'View Enhanced Report Format' under    Linked Documents section.     SPECIMEN/STAIN PROCESS:   Thyroid, Right , ultrasound guided fine needle aspiration        Pap-Cyto x 7, Diff Quick Stain-cyto x 7, Cell Block w/ H&E-Cyto x 1,   Save Ribbon, 1 x 1, Cell Block,   Level 2 x 1, Save Ribbon, 2 x 1, Cell Block, Level 3 x 1     ----------------------------------------------------------------     CYTOLOGIC INTERPRETATION:      Thyroid, Right , ultrasound guided fine needle aspiration:   Atypia of   undetermined significance     Atypia of undetermined significance   The [place] implied risk  of malignancy and recommended clinical   management:   Atypia of undetermined significance has a 10-30% risk of malignancy,   recommend repeat FNA in 4-6 weeks,   molecular testing or lobectomy     Specimen Adequacy: Satisfactory for evaluation.     COMMENT:   This case was seen in intradepartmental consultation.     Electronically signed out by:     VINNIE Mosqueda M.D.     CLINICAL HISTORY:   70 year old female with right thyroid nodule     ,     GROSS:   Thyroid, Right , ultrasound guided fine needle aspiration:  Received are 7    fixed slides, processed for Pap   stain, 7 air dried slides, processed for Diff Quik stain, and material in   formalin, processed for one   hematoxylin stained cell block.     INTRAOPERATIVE CONSULTATION:   Aspirate immediate study/adequacy:  I, Dr. YASMINE Mosqueda MD, attest that I   immediately examined smears while the   procedure was underway and determined or confirmed the adequacy of the   specimens.   It is of note that the final assessment and report may be performed and   signed by a different pathologist.     Onsite adequacy/interpretation:   Site A adequate     MICROSCOPIC:   The smears show focal cellular areas with nuclear E subnuclear O cysts   without nuclear grooves or inclusions.   Occasional cells show nucleoli and a moderate amount of cytoplasm   suggestive of Hurthle cell features.   (Dictated by: OLGA Mosqueda MD 05/10/2021)     CPT Codes:    81398-OGDG-ZLB, 57042-IYLT-CQJ   A: 50927-WIRE, 52927-PKA, HCB     COLLECTION SITE:   Client:  [facility]   Location:  [facility] (B)     The technical component of this testing was completed at the [facility], with the   professional component performed at the [address] [address] ([phone])            Social History     Tobacco Use     Smoking status: Heavy Tobacco Smoker     Packs/day: 1.00      Years: 50.00     Pack years: 50.00     Types: Cigarettes     Start date: 1/1/1972     Smokeless tobacco: Never Used     Tobacco comment: pt is slowly cutting down- 1 pack a day    Substance Use Topics     Alcohol use: No     Drug use: No           PHQ 9/3/2021 3/4/2022 9/7/2022   PHQ-9 Total Score 14 1 2   Q9: Thoughts of better off dead/self-harm past 2 weeks Not at all Not at all Not at all         ORTEGA-7 SCORE 9/3/2021 3/4/2022 9/7/2022   Total Score 5 1 0         Patient Active Problem List   Diagnosis     Benign essential hypertension     Tobacco abuse     Hyperlipidemia LDL goal <100     Taking a statin medication     Morbid obesity (H)     Calculus of kidney     Type 2 diabetes mellitus without complication, without long-term current use of insulin (H)     Current mild episode of major depressive disorder without prior episode (H)     RIGHT Thyroid nodule     History of partial left thyroid lobectomy     Past Surgical History:   Procedure Laterality Date     ABDOMEN SURGERY      2 c-sections     CHOLECYSTECTOMY  1994     GYN SURGERY      total hyst,, no cervix     HEAD & NECK SURGERY      tumor neck, benign     ORTHOPEDIC SURGERY Bilateral     carpul tunnel      ZZHC REMOVAL OF NAIL PLATE SIMPLE SINGLE  04/19/2018    removal of 1/4  left gt toenail, local        Social History     Tobacco Use     Smoking status: Heavy Tobacco Smoker     Packs/day: 1.00     Years: 50.00     Pack years: 50.00     Types: Cigarettes     Start date: 1/1/1972     Smokeless tobacco: Never Used     Tobacco comment: pt is slowly cutting down- 1 pack a day    Substance Use Topics     Alcohol use: No     Family History   Problem Relation Age of Onset     Other Cancer Mother      Other Cancer Brother      Breast Cancer Paternal Aunt            Current Outpatient Medications   Medication Sig Dispense Refill     amLODIPine (NORVASC) 10 MG tablet TAKE 1 TABLET (10 MG) BY MOUTH DAILY 90 tablet 1     aspirin 81 MG EC tablet Take 1 tablet (81  mg) by mouth daily 90 tablet 3     capsaicin (ZOSTRIX) 0.025 % external cream Apply 1 g topically 3 times daily 120 g 3     cholecalciferol (VITAMIN D3) 5000 units TABS tablet Take 1 tablet (5,000 Units) by mouth daily 90 tablet 11     citalopram (CELEXA) 20 MG tablet TAKE 1 TABLET (20 MG) BY MOUTH DAILY 90 tablet 0     losartan (COZAAR) 100 MG tablet TAKE 1 TABLET (100 MG) BY MOUTH DAILY FOR BLOOD PRESSURE 90 tablet 1     metFORMIN (GLUCOPHAGE XR) 500 MG 24 hr tablet TAKE 1 TABLET (500 MG) BY MOUTH 2 TIMES DAILY (WITH MEALS) 180 tablet 1     metoprolol tartrate (LOPRESSOR) 50 MG tablet TAKE 1 TABLET BY MOUTH TWICE A DAY FOR HEART AND BLOOD PRESSURE. 180 tablet 1     Multiple Vitamins-Iron (DAILY MULTIPLE VITAMIN/IRON) TABS Take 1 tablet by mouth daily 90 tablet 11     nystatin (MYCOSTATIN) 456543 UNIT/GM external cream Apply topically 2 times daily 60 g 1     omega 3 1000 MG CAPS 3 po daily (Patient taking differently: 1 capsule daily) 90 capsule 11     simvastatin (ZOCOR) 40 MG tablet TAKE 1 TABLET (40MG) BY MOUTH ONCE DAILY. 90 tablet 1       No Known Allergies       Recent Labs   Lab Test 03/04/22  1510 09/03/21  1408 03/01/21  1322 08/28/20  1330   A1C 5.5 5.6 5.6 5.9*   LDL 73 74 82 85   HDL 45* 43* 49* 42*   TRIG 87 152* 130 150*   ALT 22 22 27 25   CR 0.75 0.77 0.78 0.72   GFRESTIMATED 85 79 78 86   GFRESTBLACK  --   --  >90 >90   POTASSIUM 3.9 3.8 4.1 4.1   TSH 5.12* 3.61 4.00 2.86          BP Readings from Last 3 Encounters:   09/07/22 111/60   03/04/22 102/58   09/03/21 112/62    Wt Readings from Last 3 Encounters:   09/07/22 74 kg (163 lb 3.2 oz)   03/04/22 72.4 kg (159 lb 9.6 oz)   09/03/21 75.3 kg (165 lb 14.4 oz)                Review of Systems   Constitutional, HEENT, cardiovascular, pulmonary, GI, , musculoskeletal, neuro, skin, endocrine and psych systems are negative, except as otherwise noted.          Objective    /60 (BP Location: Left arm, Patient Position: Sitting, Cuff Size: Adult  Regular)   Pulse 63   Temp 97.6  F (36.4  C) (Tympanic)   Resp 18   Wt 74 kg (163 lb 3.2 oz)   SpO2 92%   BMI 30.84 kg/m    Body mass index is 30.84 kg/m .         Physical Exam   GENERAL: healthy, alert and no distress  EYES: Eyes grossly normal to inspection, PERRL and conjunctivae and sclerae normal  HENT: ear canals and TM's normal, nose and mouth without ulcers or lesions  NECK: no adenopathy, no asymmetry, masses, or scars and thyroid normal to palpation  RESP: lungs clear to auscultation - no rales, rhonchi or wheezes  CV: regular rate and rhythm, normal S1 S2, no S3 or S4, no murmur, click or rub, no peripheral edema and peripheral pulses strong  MS: no gross musculoskeletal defects noted, no edema  SKIN: no suspicious lesions or rashes  PSYCH: mentation appears normal, affect normal/bright  Diabetic foot exam: normal DP and PT pulses, no trophic changes or ulcerative lesions and normal sensory exam

## 2022-09-07 NOTE — NURSING NOTE
"Chief Complaint   Patient presents with     Chronic Disease Management       Initial /60 (BP Location: Left arm, Patient Position: Sitting, Cuff Size: Adult Regular)   Pulse 63   Temp 97.6  F (36.4  C) (Tympanic)   Resp 18   Wt 74 kg (163 lb 3.2 oz)   SpO2 92%   BMI 30.84 kg/m   Estimated body mass index is 30.84 kg/m  as calculated from the following:    Height as of 4/20/21: 1.549 m (5' 1\").    Weight as of this encounter: 74 kg (163 lb 3.2 oz).  Medication Reconciliation: complete  Agata Bob LPN  "

## 2022-09-14 ENCOUNTER — HOSPITAL ENCOUNTER (OUTPATIENT)
Dept: ULTRASOUND IMAGING | Facility: HOSPITAL | Age: 72
Discharge: HOME | End: 2022-09-14
Attending: NURSE PRACTITIONER | Admitting: NURSE PRACTITIONER
Payer: COMMERCIAL

## 2022-09-14 DIAGNOSIS — E04.1 THYROID NODULE: ICD-10-CM

## 2022-09-14 PROCEDURE — 76536 US EXAM OF HEAD AND NECK: CPT

## 2022-09-16 DIAGNOSIS — E04.1 THYROID NODULE: Primary | ICD-10-CM

## 2022-09-16 DIAGNOSIS — Z98.890 HISTORY OF THYROID SURGERY: ICD-10-CM

## 2022-09-23 ENCOUNTER — TELEPHONE (OUTPATIENT)
Dept: INTERVENTIONAL RADIOLOGY/VASCULAR | Facility: HOSPITAL | Age: 72
End: 2022-09-23

## 2022-09-23 RX ORDER — DEXTROSE MONOHYDRATE 25 G/50ML
25-50 INJECTION, SOLUTION INTRAVENOUS
Status: CANCELLED | OUTPATIENT
Start: 2022-09-23

## 2022-09-23 RX ORDER — NICOTINE POLACRILEX 4 MG
15-30 LOZENGE BUCCAL
Status: CANCELLED | OUTPATIENT
Start: 2022-09-23

## 2022-09-23 RX ORDER — SODIUM CHLORIDE 9 MG/ML
INJECTION, SOLUTION INTRAVENOUS CONTINUOUS PRN
Status: CANCELLED | OUTPATIENT
Start: 2022-09-23

## 2022-09-23 RX ORDER — LIDOCAINE 40 MG/G
CREAM TOPICAL
Status: CANCELLED | OUTPATIENT
Start: 2022-09-23

## 2022-09-23 RX ORDER — LIDOCAINE HYDROCHLORIDE 10 MG/ML
10 INJECTION, SOLUTION EPIDURAL; INFILTRATION; INTRACAUDAL; PERINEURAL ONCE
Status: CANCELLED | OUTPATIENT
Start: 2022-09-23 | End: 2022-09-23

## 2022-09-26 ENCOUNTER — HOSPITAL ENCOUNTER (OUTPATIENT)
Dept: ULTRASOUND IMAGING | Facility: HOSPITAL | Age: 72
Discharge: HOME | End: 2022-09-26
Attending: OTOLARYNGOLOGY
Payer: COMMERCIAL

## 2022-09-26 ENCOUNTER — HOSPITAL ENCOUNTER (OUTPATIENT)
Facility: HOSPITAL | Age: 72
Discharge: HOME | End: 2022-09-26
Attending: OTOLARYNGOLOGY | Admitting: RADIOLOGY
Payer: COMMERCIAL

## 2022-09-26 VITALS
SYSTOLIC BLOOD PRESSURE: 127 MMHG | OXYGEN SATURATION: 95 % | HEART RATE: 54 BPM | RESPIRATION RATE: 16 BRPM | DIASTOLIC BLOOD PRESSURE: 75 MMHG

## 2022-09-26 DIAGNOSIS — E04.1 THYROID NODULE: ICD-10-CM

## 2022-09-26 DIAGNOSIS — Z98.890 HISTORY OF THYROID SURGERY: ICD-10-CM

## 2022-09-26 PROCEDURE — 88305 TISSUE EXAM BY PATHOLOGIST: CPT | Mod: TC | Performed by: OTOLARYNGOLOGY

## 2022-09-26 PROCEDURE — 88112 CYTOPATH CELL ENHANCE TECH: CPT | Mod: TC,59 | Performed by: OTOLARYNGOLOGY

## 2022-09-26 PROCEDURE — 88112 CYTOPATH CELL ENHANCE TECH: CPT | Mod: 26 | Performed by: PATHOLOGY

## 2022-09-26 PROCEDURE — 250N000009 HC RX 250: Performed by: RADIOLOGY

## 2022-09-26 PROCEDURE — 88305 TISSUE EXAM BY PATHOLOGIST: CPT | Mod: 26 | Performed by: PATHOLOGY

## 2022-09-26 PROCEDURE — 88173 CYTOPATH EVAL FNA REPORT: CPT | Mod: 26 | Performed by: PATHOLOGY

## 2022-09-26 PROCEDURE — 10005 FNA BX W/US GDN 1ST LES: CPT

## 2022-09-26 RX ORDER — NICOTINE POLACRILEX 4 MG
15-30 LOZENGE BUCCAL
Status: DISCONTINUED | OUTPATIENT
Start: 2022-09-26 | End: 2022-09-27 | Stop reason: HOSPADM

## 2022-09-26 RX ORDER — SODIUM CHLORIDE 9 MG/ML
INJECTION, SOLUTION INTRAVENOUS CONTINUOUS PRN
Status: DISCONTINUED | OUTPATIENT
Start: 2022-09-26 | End: 2022-09-27 | Stop reason: HOSPADM

## 2022-09-26 RX ORDER — DEXTROSE MONOHYDRATE 25 G/50ML
25-50 INJECTION, SOLUTION INTRAVENOUS
Status: DISCONTINUED | OUTPATIENT
Start: 2022-09-26 | End: 2022-09-27 | Stop reason: HOSPADM

## 2022-09-26 RX ORDER — LIDOCAINE 40 MG/G
CREAM TOPICAL
Status: DISCONTINUED | OUTPATIENT
Start: 2022-09-26 | End: 2022-09-27 | Stop reason: HOSPADM

## 2022-09-26 RX ORDER — LIDOCAINE HYDROCHLORIDE 10 MG/ML
10 INJECTION, SOLUTION EPIDURAL; INFILTRATION; INTRACAUDAL; PERINEURAL ONCE
Status: COMPLETED | OUTPATIENT
Start: 2022-09-26 | End: 2022-09-26

## 2022-09-26 RX ADMIN — LIDOCAINE HYDROCHLORIDE 5 ML: 10 INJECTION, SOLUTION EPIDURAL; INFILTRATION; INTRACAUDAL; PERINEURAL at 11:56

## 2022-09-26 NOTE — DISCHARGE INSTRUCTIONS
Discharge Instructions for Fine-Needle Thyroid Biopsy  You have had a procedure called fine-needle thyroid biopsy. This biopsy was done to learn more about a nodule or cyst in your thyroid gland or to find out what might be causing enlargement of your thyroid. During the biopsy, a very thin needle is inserted through the skin into the gland. The needle is used to remove a small amount of tissue from the gland. More than one tissue sample may be done to be sure to get cells from all parts of the nodule. Or the needle might be used to drain fluid from a cyst. Often a special ultrasound probe or transducer is used to help guide the needle to the right place. The tissue or fluid is then studied in a lab.   Home care  Here are some tips to take care of yourself at home:   You will have a small adhesive bandage on your biopsy site. Leave the bandage on for 4 to 6 hours. After this time, you don't need to keep it covered.   If you feel discomfort after the biopsy, take over-the-counter pain medicine. Don't take aspirin. It's normal to feel sore for a day or two.  Ask your healthcare provider when you can return to work and normal activities. This will likely be the same day as your procedure.  Getting your results  Your biopsy results may take a few days. When the results are ready, your healthcare provider will discuss them with you and tell you what, if anything, needs to be done next.   Follow-up  Make a follow-up appointment as directed by your healthcare provider.  When to call your healthcare provider  Call your healthcare provider right away if you have any of the following:  Bleeding that won t stop  Shortness of breath or trouble breathing  Fever of 100.4 F (38 C) or higher  Increasing pain, redness, tenderness, or drainage at the biopsy site  Swelling of the biopsy site  Be sure you understand what problems you should watch for and know how to reach the healthcare provider after hours and on weekends.    Deb last reviewed this educational content on 6/1/2018 2000-2021 [employer]. All rights reserved. This information is not intended as a substitute for professional medical care. Always follow your healthcare professional's instructions.

## 2022-09-26 NOTE — PROGRESS NOTES
Procedure: Fine Needle Biopsy, Thyroid, RIGHT    There were  no complications and patient has no symptoms..      Tolerated procedure well.    Patient to US.  Consent complete.  Supine on Ultrasound table.      Radiologist:Dr Vargas    Time Out: Prior to the start of the procedure and with procedural staff participation, I verbally confirmed the patient s identity using two indicators, relevant allergies, that the procedure was appropriate and matched the consent or emergent situation, and that the correct equipment/implants were available. Immediately prior to starting the procedure I conducted the Time Out with the procedural staff and re-confirmed the patient s name, procedure, and site/side. (The Joint Commission universal protocol was followed.)  Yes    Position:  supine    Pain:  0    Sedation:None. Local Anesthestic used  No sedation    Estimated Blood Loss: None     Condition: Stable    Comments: See dictated procedure note for full details     ANNEL DOVER RN

## 2022-09-26 NOTE — IP AVS SNAPSHOT
[facility]  [address] [place] [id number]  Phone: [phone]                                      After Visit Summary   9/26/2022    Aga Gary   MRN: [id number]           After Visit Summary Signature Page    I have received my discharge instructions, and my questions have been answered. I have discussed any challenges I see with this plan with the nurse or doctor.    ..........................................................................................................................................  Patient/Patient Representative Signature      ..........................................................................................................................................  Patient Representative Print Name and Relationship to Patient    ..................................................               ................................................  Date                                   Time    ..........................................................................................................................................  Reviewed by Signature/Title    ...................................................              ..............................................  Date                                               Time          22EPIC Rev 08/18

## 2022-09-26 NOTE — IP AVS SNAPSHOT
After Visit Summary Template Not Found    This Print Group is only intended to be used in the After Visit Summary and can only be used in a report that uses a released After Visit Summary Template.                       MRN:[id number]                          After Visit Summary   9/26/2022    Aga Gary   MRN: [id number]           Visit Information        Provider Department      9/26/2022 11:30 AM HIXRRN; HIIRRAD; HIUS1 HI ULTRASOUND           Review of your medicines      UNREVIEWED medicines. Ask your doctor about these medicines       Dose / Directions   amLODIPine 10 MG tablet  Commonly known as: NORVASC  Used for: Benign essential hypertension      Dose: 10 mg  TAKE 1 TABLET (10 MG) BY MOUTH DAILY  Quantity: 90 tablet  Refills: 1     aspirin 81 MG EC tablet  Commonly known as: ASA  Used for: Benign essential hypertension      Dose: 81 mg  Take 1 tablet (81 mg) by mouth daily  Quantity: 90 tablet  Refills: 3     capsaicin 0.025 % external cream  Commonly known as: ZOSTRIX  Used for: Diabetic polyneuropathy associated with type 2 diabetes mellitus (H), Foot pain, bilateral      Dose: 1 g  Apply 1 g topically 3 times daily  Quantity: 120 g  Refills: 3     citalopram 20 MG tablet  Commonly known as: celeXA  Used for: Current mild episode of major depressive disorder without prior episode (H)      Dose: 20 mg  TAKE 1 TABLET (20 MG) BY MOUTH DAILY  Quantity: 90 tablet  Refills: 0     Daily Multiple Vitamin/Iron Tabs  Used for: Encounter to establish care      Dose: 1 tablet  Take 1 tablet by mouth daily  Quantity: 90 tablet  Refills: 11     losartan 100 MG tablet  Commonly known as: COZAAR  Used for: Benign essential hypertension      TAKE 1 TABLET (100 MG) BY MOUTH DAILY FOR BLOOD PRESSURE  Quantity: 90 tablet  Refills: 1     metFORMIN 500 MG 24 hr tablet  Commonly known as: GLUCOPHAGE XR  Used for: Type 2 diabetes mellitus without complication, without long-term current use of insulin (H)      Dose: 500  mg  TAKE 1 TABLET (500 MG) BY MOUTH 2 TIMES DAILY (WITH MEALS)  Quantity: 180 tablet  Refills: 1     metoprolol tartrate 50 MG tablet  Commonly known as: LOPRESSOR  Used for: Benign essential hypertension      TAKE 1 TABLET BY MOUTH TWICE A DAY FOR HEART AND BLOOD PRESSURE.  Quantity: 180 tablet  Refills: 1     * nicotine 21 MG/24HR 24 hr patch  Commonly known as: NICODERM CQ  Used for: Tobacco abuse      Dose: 1 patch  Place 1 patch onto the skin every 24 hours  Quantity: 30 patch  Refills: 0     * nicotine 14 MG/24HR 24 hr patch  Commonly known as: NICODERM CQ  Used for: Tobacco abuse      Dose: 1 patch  Place 1 patch onto the skin every 24 hours  Quantity: 30 patch  Refills: 0     * nicotine 7 MG/24HR 24 hr patch  Commonly known as: NICODERM CQ  Used for: Tobacco abuse      Dose: 1 patch  Place 1 patch onto the skin every 24 hours  Quantity: 30 patch  Refills: 0     nystatin 469164 UNIT/GM external cream  Commonly known as: MYCOSTATIN  Used for: Candidiasis of skin      Apply topically 2 times daily  Quantity: 60 g  Refills: 1     simvastatin 40 MG tablet  Commonly known as: ZOCOR  Used for: Hyperlipidemia LDL goal <100      TAKE 1 TABLET (40MG) BY MOUTH ONCE DAILY.  Quantity: 90 tablet  Refills: 1     Vitamin D3 125 MCG (5000 UT) tablet  Commonly known as: CHOLECALCIFEROL  Used for: Encounter to establish care      Dose: 5,000 Units  Take 1 tablet (5,000 Units) by mouth daily  Quantity: 90 tablet  Refills: 11         * This list has 3 medication(s) that are the same as other medications prescribed for you. Read the directions carefully, and ask your doctor or other care provider to review them with you.            CONTINUE these medicines which may have CHANGED, or have new prescriptions. If we are uncertain of the size of tablets/capsules you have at home, strength may be listed as something that might have changed.       Dose / Directions   omega 3 1000 MG Caps  This may have changed: additional  instructions  Used for: Encounter to establish care, Benign essential hypertension      3 po daily  Quantity: 90 capsule  Refills: 11              Protect others around you: Learn how to safely use, store and throw away your medicines at www.disposemymeds.org.       Follow-ups after your visit       Your next 10 appointments already scheduled    Sep 26, 2022 11:30 AM  (Arrive by 11:15 AM)  US BIOPSY THYROID FINE NEEDLE ASPIRATION with HIUS1, HIIRRAD, HIXRRN  HI ULTRASOUND ([facility] ) [address] [place] [address]  [phone]   How do I prepare for my exam? (Food and drink instructions)  No Food and Drink Restrictions.    How do I prepare for my exam? (Other instructions)  IF YOUR DOCTOR ALSO PRESCRIBED SEDATION DURING THE EXAM (medicine to help you relax): You will receive separate instructions about driving, eating, and additional tests that may be necessary prior to your exam day.    What should I wear: Wear comfortable clothes.    How long does the exam take: Aspiration (no sedation treatment takes about an hour).  For paracentesis, thoracentesis or sedation plan to spend at least three hours at the hospital.    What should I bring: Bring a list of your medicines, including vitamins, minerals and over-the-counter drugs. It is safest to leave personal items at home. Bring your 's license or photo ID and your insurance card.    Do I need a :  No  is needed.    What do I need to tell my doctor:  Tell your doctor in advance:  * If you are or may be pregnant.  * If you are taking Coumadin (or any other blood thinners) 5 days prior to the exam for any special instructions.  * If you are diabetic to determine if your insulin needs have to be adjusted for the exam.    What should I do after the exam: Take it easy the rest of the day. You can return to normal activities the next day.    What is this test: This test uses a long, thin needle or tube to remove tissue,  fluid, or other cells from your body. Pictures from an ultrasound will guide the needle to the right place. (Ultrasound uses sound waves to create pictures of the body on a video screen. You will not feel the sound waves.)    * A biopsy removes tissue or other cells from the body; we send the tissue or cells to a lab for testing.  * Paracentesis removes fluid from the belly (abdomen) to relieve pressure, to test the fluid or both.  * Thoracentesis removes fluid from the sac around the lungs to relieve pressure, to test the fluid or both.  * Aspiration removes fluid from any part of the body, then the fluid is tested for disease or infection.    Who should I call with questions: If you have any questions, please call the Imaging Department where you will have your exam. Directions, parking instructions, and other information are available on our website, [email or web address].       Sep 29, 2022  1:30 PM  (Arrive by 1:15 PM)  MA SCREENING DIGITAL BILATERAL with HCMA1  [facility] ([facility] ) [address] [place] [address]  [phone]   How do I prepare for my exam? (Food and drink instructions)  No Food and Drink Restrictions.    How do I prepare for my exam? (Other instructions)  Do not use any powder, lotion or deodorant under your arms or on your breast. If you do, we will ask you to remove it before your exam. Do not wear any scented perfume or cologne to your appointment.    What should I wear: Wear comfortable, two-piece clothing.    How long does the exam take: Most exams will take 15 minutes. If you are having any additional imaging, please allow extra time.    What should I bring: Bring any previous mammograms from other facilities or have them mailed to medical records.    Do I need a :  No  is needed.    What do I need to tell my doctor: If you have any allergies, tell your care team.    What should I do after the exam: No restrictions, you may resume  "normal activities.    What is this test: This test is an x-ray of the breast to look for breast disease. The breast is pressed between two plates to flatten and spread the tissue. An X-ray is taken of the breast from different angles.    Who should I call with questions: If you have any questions, please call the Imaging Department where you will have your exam. Directions, parking instructions, and other information are available on our website, [email or web address].    Other information about my exam    Three-Dimensional (3D) mammograms are available at all [facility] locations except [facility].   3D is covered by all insurance companies, 3D may be subject to copay and deductible. Check with your insurance company before your appointment for your specific coverage information.  If you are requesting to schedule a screening mammogram and it is less than ONE year and ONE day from your last mammogram,  please verify your mammography benefits with your health insurance as you may be responsible for all charges associated with this exam.     Benefits of 3D mammograms include:  * Improved rate of cancer detection  * Decreases your chance of having to go back for more tests, which means fewer:  * \"False-positive\" results (This means that there is an abnormal area but it isn't cancer.)  * Invasive testing procedures, such as a biopsy or surgery  * Can provide clearer images of the breast if you have dense breast tissue.  *3D mammography is an optional exam that anyone can have with a 2D mammogram. It doesn't replace or take the place of a 2D mammogram. 2D mammograms remain an effective screening test for all women.  Three-dimensional (3D) mammograms are available at [facility] locations in [facility], [facility], [facility], and [place]. [facility] locations include [facility] in [place].  Benefits of 3D mammograms include:  - " "Improved rate of cancer detection  - Decreases your chance of having to go back for more tests, which means fewer:  - \"False-positive\" results (This means that there is an abnormal area but it isn't cancer.)  - Invasive testing procedures, such as a biopsy or surgery  - Can provide clearer images of the breast if you have dense breast tissue.  3D mammography is an optional exam that anyone can have with a 2D mammogram. It doesn't replace or take the place of a 2D mammogram. 2D mammograms remain an effective screening test for all women.  Not all insurance companies cover the cost of a 3D mammogram. Check with your insurance.       Mar 15, 2023  1:15 PM  (Arrive by 1:00 PM)  SHORT with Ana Valdez CNP  [facility] ([facility] ) [address]  [place] [address]  [phone]           Care Instructions       Further instructions from your care team         Discharge Instructions for Fine-Needle Thyroid Biopsy  You have had a procedure called fine-needle thyroid biopsy. This biopsy was done to learn more about a nodule or cyst in your thyroid gland or to find out what might be causing enlargement of your thyroid. During the biopsy, a very thin needle is inserted through the skin into the gland. The needle is used to remove a small amount of tissue from the gland. More than one tissue sample may be done to be sure to get cells from all parts of the nodule. Or the needle might be used to drain fluid from a cyst. Often a special ultrasound probe or transducer is used to help guide the needle to the right place. The tissue or fluid is then studied in a lab.   Home care  Here are some tips to take care of yourself at home:   You will have a small adhesive bandage on your biopsy site. Leave the bandage on for 4 to 6 hours. After this time, you don't need to keep it covered.   If you feel discomfort after the biopsy, take over-the-counter pain medicine. Don't take " "aspirin. It's normal to feel sore for a day or two.  Ask your healthcare provider when you can return to work and normal activities. This will likely be the same day as your procedure.  Getting your results  Your biopsy results may take a few days. When the results are ready, your healthcare provider will discuss them with you and tell you what, if anything, needs to be done next.   Follow-up  Make a follow-up appointment as directed by your healthcare provider.  When to call your healthcare provider  Call your healthcare provider right away if you have any of the following:  Bleeding that won t stop  Shortness of breath or trouble breathing  Fever of 100.4 F (38 C) or higher  Increasing pain, redness, tenderness, or drainage at the biopsy site  Swelling of the biopsy site  Be sure you understand what problems you should watch for and know how to reach the healthcare provider after hours and on weekends.   [employer] last reviewed this educational content on 2018-2021 [employer]. All rights reserved. This information is not intended as a substitute for professional medical care. Always follow your healthcare professional's instructions.          Additional Information About Your Visit       [employer] Information    [employer] lets you send messages to your doctor, view your test results, renew your prescriptions, schedule appointments and more. To sign up, go to [email or web address] . Click on \"Log in\" on the left side of the screen, which will take you to the Welcome page. Then click on \"Sign up Now\" on the right side of the page.     You will be asked to enter the access code listed below, as well as some personal information. Please follow the directions to create your username and password.     Your access code is: [email or web address]  Expires: 2022  2:40 PM     Your access code will  in 60 days. If you need help or a new code, please call your   [facility] or " [phone].       Care EveryWhere ID    This is your Care EveryWhere ID. This could be used by other organizations to access your [facility] medical records  [id number]        Primary Care Provider  Ana Valdez CNP Office Phone #  [phone] Fax #  [phone]      Equal Access to Services    CLAUDIA COOL : Hadii aad ku hadasho Soomaali, waaxda luqadaha, qaybta kaalmada adeegyada, waxay idiin hayaan adeeg kharash la'aan ah. So [facility] [phone].    ATENCIÓN: Si habla español, tiene a roy disposición servicios gratuitos de asistencia lingüística. Llame al [phone].    We comply with applicable federal and state civil rights laws, including the [place] Human Rights Act. We do not discriminate on the basis of race, color, creed, Baptism, national origin, marital status, age, disability, sex, sexual orientation, or gender identity.    If you would like an itemization of your charges they will now be available in [employer] 30 days after discharge. To access the itemized statements in [employer] go to billing/billing summary. From there select view account. There will be multiple tabs showing an overview of your account, detail, payments, and communications. From the communications tab you can see your monthly statements, your itemized statements, and any billing letters generated for your account. If you do not have a [employer] account and need help getting access please contact [employer] support at [phone].  If you would prefer to have your itemized statements mailed please contact our automated itemized bill request line at [phone] option  2.       Thank you!    Thank you for choosing [facility] for your care. Our goal is always to provide you with excellent care. Hearing back from our patients is one way we can continue to improve our services. Please take a few minutes to complete the written survey that you may receive in the mail after you visit with us. Thank you!            Medication List       Medications          Morning Afternoon Evening Bedtime As Needed    omega 3 1000 MG Caps  INSTRUCTIONS: 3 po daily                       ASK your doctor about these medications          Morning Afternoon Evening Bedtime As Needed    amLODIPine 10 MG tablet  Also known as: NORVASC  INSTRUCTIONS: TAKE 1 TABLET (10 MG) BY MOUTH DAILY                     aspirin 81 MG EC tablet  Also known as: ASA  INSTRUCTIONS: Take 1 tablet (81 mg) by mouth daily                     capsaicin 0.025 % external cream  Also known as: ZOSTRIX  INSTRUCTIONS: Apply 1 g topically 3 times daily                     citalopram 20 MG tablet  Also known as: celeXA  INSTRUCTIONS: TAKE 1 TABLET (20 MG) BY MOUTH DAILY                     Daily Multiple Vitamin/Iron Tabs  INSTRUCTIONS: Take 1 tablet by mouth daily                     losartan 100 MG tablet  Also known as: COZAAR  INSTRUCTIONS: TAKE 1 TABLET (100 MG) BY MOUTH DAILY FOR BLOOD PRESSURE                     metFORMIN 500 MG 24 hr tablet  Also known as: GLUCOPHAGE XR  INSTRUCTIONS: TAKE 1 TABLET (500 MG) BY MOUTH 2 TIMES DAILY (WITH MEALS)                     metoprolol tartrate 50 MG tablet  Also known as: LOPRESSOR  INSTRUCTIONS: TAKE 1 TABLET BY MOUTH TWICE A DAY FOR HEART AND BLOOD PRESSURE.                     * nicotine 21 MG/24HR 24 hr patch  Also known as: NICODERM CQ  INSTRUCTIONS: Place 1 patch onto the skin every 24 hours                     * nicotine 14 MG/24HR 24 hr patch  Also known as: NICODERM CQ  INSTRUCTIONS: Place 1 patch onto the skin every 24 hours                     * nicotine 7 MG/24HR 24 hr patch  Also known as: NICODERM CQ  INSTRUCTIONS: Place 1 patch onto the skin every 24 hours                     nystatin 976630 UNIT/GM external cream  Also known as: MYCOSTATIN  INSTRUCTIONS: Apply topically 2 times daily                     simvastatin 40 MG tablet  Also known as: ZOCOR  INSTRUCTIONS: TAKE 1 TABLET (40MG) BY MOUTH ONCE DAILY.                     Vitamin D3  125 MCG (5000 UT) tablet  Also known as: CHOLECALCIFEROL  INSTRUCTIONS: Take 1 tablet (5,000 Units) by mouth daily                        * This list has 3 medication(s) that are the same as other medications prescribed for you. Read the directions carefully, and ask your doctor or other care provider to review them with you.

## 2022-09-27 ENCOUNTER — TELEPHONE (OUTPATIENT)
Dept: INTERVENTIONAL RADIOLOGY/VASCULAR | Facility: HOSPITAL | Age: 72
End: 2022-09-27

## 2022-09-27 NOTE — PROVIDER NOTIFICATION
Patient states she's having some discomfort at the site. Instructed to use ice cesar. Call back number given if needed .

## 2022-09-29 LAB
PATH REPORT.COMMENTS IMP SPEC: ABNORMAL
PATH REPORT.COMMENTS IMP SPEC: ABNORMAL
PATH REPORT.COMMENTS IMP SPEC: YES
PATH REPORT.FINAL DX SPEC: ABNORMAL
PATH REPORT.GROSS SPEC: ABNORMAL
PATH REPORT.MICROSCOPIC SPEC OTHER STN: ABNORMAL

## 2022-09-30 ENCOUNTER — TELEPHONE (OUTPATIENT)
Dept: FAMILY MEDICINE | Facility: OTHER | Age: 72
End: 2022-09-30

## 2022-09-30 DIAGNOSIS — E04.1 THYROID NODULE: Primary | ICD-10-CM

## 2022-09-30 NOTE — TELEPHONE ENCOUNTER
11:31 AM    Reason for Call: Phone Call    Description: Needs to speak to Ana Valdez and her nurse regarding her biopsy results.     Was an appointment offered for this call? No  If yes : Appointment type              Date    Preferred method for responding to this message: Telephone Call  What is your phone number ? [phone]    If we cannot reach you directly, may we leave a detailed response at the number you provided? Yes    Can this message wait until your PCP/provider returns, if available today? YES, No

## 2022-09-30 NOTE — TELEPHONE ENCOUNTER
Patient called back and would like to see endocrinology at [facility] in [place]. See Dr. Rodriguez note lab results. Please place referral.

## 2022-10-03 NOTE — TELEPHONE ENCOUNTER
Referred to [facility].  (the only option I saw in [facility] was hospital, internal med - she needs to see endocrinology - first available.  Pls send my last note, Dr Rodriguez notes, biopsy report, pathology.    Thank you    Ana LONDON  [phone]

## 2022-10-28 DIAGNOSIS — I10 BENIGN ESSENTIAL HYPERTENSION: ICD-10-CM

## 2022-10-28 RX ORDER — AMLODIPINE BESYLATE 10 MG/1
10 TABLET ORAL DAILY
Qty: 90 TABLET | Refills: 1 | Status: SHIPPED | OUTPATIENT
Start: 2022-10-28 | End: 2023-05-01

## 2022-10-28 RX ORDER — LOSARTAN POTASSIUM 100 MG/1
TABLET ORAL
Qty: 90 TABLET | Refills: 1 | Status: SHIPPED | OUTPATIENT
Start: 2022-10-28 | End: 2023-05-01

## 2022-10-28 RX ORDER — METOPROLOL TARTRATE 50 MG
TABLET ORAL
Qty: 180 TABLET | Refills: 1 | Status: SHIPPED | OUTPATIENT
Start: 2022-10-28 | End: 2023-05-01

## 2023-01-03 ENCOUNTER — OFFICE VISIT (OUTPATIENT)
Dept: DERMATOLOGY | Facility: OTHER | Age: 73
End: 2023-01-03
Attending: NURSE PRACTITIONER
Payer: COMMERCIAL

## 2023-01-03 VITALS
BODY MASS INDEX: 29.64 KG/M2 | HEART RATE: 70 BPM | SYSTOLIC BLOOD PRESSURE: 120 MMHG | OXYGEN SATURATION: 99 % | DIASTOLIC BLOOD PRESSURE: 74 MMHG | WEIGHT: 157 LBS | HEIGHT: 61 IN

## 2023-01-03 DIAGNOSIS — L57.0 ACTINIC KERATOSIS: ICD-10-CM

## 2023-01-03 PROCEDURE — G0463 HOSPITAL OUTPT CLINIC VISIT: HCPCS

## 2023-01-03 PROCEDURE — 17110 DESTRUCTION B9 LES UP TO 14: CPT | Performed by: DERMATOLOGY

## 2023-01-03 PROCEDURE — 17110 DESTRUCTION B9 LES UP TO 14: CPT

## 2023-01-03 ASSESSMENT — PAIN SCALES - GENERAL: PAINLEVEL: NO PAIN (0)

## 2023-01-03 NOTE — PROGRESS NOTES
Visit Date: 2023    SUBJECTIVE:  First visit for Ramona, who comes in with her daughter or a friend.  She has concerns about some lesions on her back, which she states itch and bother her a great deal.    OBJECTIVE:  On examination of the back, there are numerous large bulky seborrheic keratoses, some of which are 1-2 cm in diameter, very thick, dark and typical.  Many more that are flattened and also typical.    ASSESSMENT:  Seborrheic keratoses.    PLAN:  I chose to treat several of these as a test treatment  but did advise that these are not covered as far as removing many or most of them.    I advised there are no good over-the-counter treatments for these. She was I believe satisfied with our explanation and trial treatment.  I asked her to return p.r.rabia Hughes MD        D: 2023   T: 2023   MT: CAPRICE    Name:     RAMONA WHITTEN  MRN:      [id number]        Account:    [id number]   :      1950           Visit Date: 2023     Document: [id number]

## 2023-01-03 NOTE — PATIENT INSTRUCTIONS
The growths on your back are called seborrheic keratoses.  They are harmless and do not advance into skin cancer. We treated a few today with liquid nitrogen.

## 2023-01-03 NOTE — LETTER
1/3/2023       RE: Ramona Whitten  [address] [facility] [address]     Dear Colleague,    Thank you for referring your patient, Ramona Whitten, to the [facility]. Please see a copy of my visit note below.    Visit Date: 2023    SUBJECTIVE:  First visit for Ramona, who comes in with her daughter or a friend.  She has concerns about some lesions on her back, which she states itch and bother her a great deal.    OBJECTIVE:  On examination of the back, there are numerous large bulky seborrheic keratoses, some of which are 1-2 cm in diameter, very thick, dark and typical.  Many more that are flattened and also typical.    ASSESSMENT:  Seborrheic keratoses.    PLAN:  I chose to treat several of these as a test treatment  but did advise that these are not covered as far as removing many or most of them.    I advised there are no good over-the-counter treatments for these. She was I believe satisfied with our explanation and trial treatment.  I asked her to return p.r.n.    GAVIN Hughes MD        D: 2023   T: 2023   MT: CAPRICE    Name:     RAMONA WHITTEN  MRN:      [id number]        Account:    [id number]   :      1950           Visit Date: 2023     Document: [id number]      Again, thank you for allowing me to participate in the care of your patient.      Sincerely,    GAVIN Hughes MD

## 2023-01-05 DIAGNOSIS — E78.5 HYPERLIPIDEMIA LDL GOAL <100: ICD-10-CM

## 2023-01-06 RX ORDER — SIMVASTATIN 40 MG
TABLET ORAL
Qty: 90 TABLET | Refills: 1 | Status: SHIPPED | OUTPATIENT
Start: 2023-01-06 | End: 2023-07-28

## 2023-01-06 NOTE — TELEPHONE ENCOUNTER
simvastatin (ZOCOR) 40 MG tablet    Last Written Prescription Date:  6/24/2022  Last Fill Quantity: 90,   # refills: 1  Last Office Visit: 9/07/2022  Future Office visit:    Next 5 appointments (look out 90 days)    Mar 15, 2023  1:15 PM  (Arrive by 1:00 PM)  SHORT with Ana Valdez CNP  [facility] ([facility] ) [address]  [facility] [address]  [phone]

## 2023-01-24 NOTE — TELEPHONE ENCOUNTER
Simvastatin      Last Written Prescription Date:  3/4/2022  Last Fill Quantity: 90,   # refills: 1  Last Office Visit: 3/4/2022  Future Office visit:    Next 5 appointments (look out 90 days)    Sep 07, 2022  1:45 PM  (Arrive by 1:30 PM)  SHORT with Ana Valdez CNP  [facility] ([facility] ) [address]  [facility] [address]  [phone]               
[TextBox_4] : Pt is a 75 yo F with PMH asthma/COPD, ASD repair, CAD, Afib (paroxysmal) s/p ablation, subdural hematoma s/p [place] hole evacuation, IDDM, HTN, HLD. \par \par Initial intake 9/3/20: Found to have DVT in right leg 7/23/20 during visit with Dr. Santos, started on Eliquis. Prior to this, she states she did not have a history of any blood clots. Then presented to ED on 7/25/20 with chest pain on left, worse with inspiration, pain 10/10. Wells score 6. CTA-PE with segmental and subsegmental PEs on right. tPA not given due to h/o intracranial bleed. Leg dopplers showed new partially occlusive DVT in right common femoral vein extending to distal femoral vein and profunda femoris. \par \par Echo 7/26/20: RV not well visualized. PASP was not estimated. \par \par She continues on Eliquis.  Currently denies chest pain, bleeding episodes on Eliquis, new leg pain or leg swelling. She can walk about 1/2 block, limited by SOB. This is stable as compared to before her PE diagnosis as per pt. Overall, she feels that she is at her baseline. \par \par Of note, pt had seen Dr. Sarabia in 03/2020 for evaluation of asthma. On Symbicort 80-4.5 BID. \par Quit smoking 20-30 years ago, one pack would last her one week. \par \par 1-31-23:\par \par \par 4-26-22:\par Her breathing is the same\par She isn't sure the dose of symbicort\par she can walk 1 block (last visit said she could only walk 1/2 block)\par She doesn't walk everyday because she doesn't feel like it\par Before she had a blood clot, she was walking the same distance

## 2023-03-15 DIAGNOSIS — R73.03 PREDIABETES: ICD-10-CM

## 2023-03-16 NOTE — TELEPHONE ENCOUNTER
Failed protocol    Lab Results   Component Value Date    A1C 5.7 09/07/2022    A1C 5.5 03/04/2022    A1C 5.6 09/03/2021    A1C 5.6 03/01/2021    A1C 5.9 08/28/2020    A1C 7.2 01/03/2020     metFORMIN (GLUCOPHAGE XR) 500 MG 24 hr tablet      Last Written Prescription Date:  6/12/22  Last Fill Quantity: 180,   # refills: 1  Last Office Visit: 3/16/23  Future Office visit:       Routing refill request to provider for review/approval because:  Drug not on the [facility], [facility] or [facility] refill protocol or controlled substance

## 2023-03-17 RX ORDER — METFORMIN HCL 500 MG
500 TABLET, EXTENDED RELEASE 24 HR ORAL 2 TIMES DAILY WITH MEALS
Qty: 180 TABLET | Refills: 1 | Status: SHIPPED | OUTPATIENT
Start: 2023-03-17 | End: 2023-10-23

## 2023-04-13 DIAGNOSIS — F32.0 CURRENT MILD EPISODE OF MAJOR DEPRESSIVE DISORDER WITHOUT PRIOR EPISODE (H): ICD-10-CM

## 2023-04-14 RX ORDER — CITALOPRAM HYDROBROMIDE 20 MG/1
20 TABLET ORAL DAILY
Qty: 30 TABLET | Refills: 0 | Status: SHIPPED | OUTPATIENT
Start: 2023-04-14 | End: 2023-05-01

## 2023-04-14 NOTE — TELEPHONE ENCOUNTER
Charua      Last Written Prescription Date:  08/10/22  Last Fill Quantity: 90,   # refills: 0  Last Office Visit: 09/07/22  Future Office visit:    Next 5 appointments (look out 90 days)    May 01, 2023  3:00 PM  (Arrive by 2:45 PM)  SHORT with Ana Valdez CNP  [facility] ([facility] ) [address]  [facility] [address]  [phone]           Pt failed 03/15/23 appt with KAT Valdez.

## 2023-05-01 ENCOUNTER — OFFICE VISIT (OUTPATIENT)
Dept: FAMILY MEDICINE | Facility: OTHER | Age: 73
End: 2023-05-01
Attending: NURSE PRACTITIONER
Payer: COMMERCIAL

## 2023-05-01 ENCOUNTER — LAB (OUTPATIENT)
Dept: LAB | Facility: OTHER | Age: 73
End: 2023-05-01
Payer: COMMERCIAL

## 2023-05-01 VITALS
TEMPERATURE: 97.8 F | DIASTOLIC BLOOD PRESSURE: 68 MMHG | SYSTOLIC BLOOD PRESSURE: 114 MMHG | HEART RATE: 71 BPM | RESPIRATION RATE: 22 BRPM | OXYGEN SATURATION: 92 % | WEIGHT: 163.1 LBS | BODY MASS INDEX: 30.82 KG/M2

## 2023-05-01 DIAGNOSIS — Z12.11 SPECIAL SCREENING FOR MALIGNANT NEOPLASMS, COLON: ICD-10-CM

## 2023-05-01 DIAGNOSIS — Z87.891 PERSONAL HISTORY OF TOBACCO USE: ICD-10-CM

## 2023-05-01 DIAGNOSIS — I10 BENIGN ESSENTIAL HYPERTENSION: ICD-10-CM

## 2023-05-01 DIAGNOSIS — R73.03 PREDIABETES: Primary | ICD-10-CM

## 2023-05-01 DIAGNOSIS — F32.0 CURRENT MILD EPISODE OF MAJOR DEPRESSIVE DISORDER WITHOUT PRIOR EPISODE (H): ICD-10-CM

## 2023-05-01 DIAGNOSIS — Z71.89 ADVANCED DIRECTIVES, COUNSELING/DISCUSSION: ICD-10-CM

## 2023-05-01 DIAGNOSIS — Z78.0 MENOPAUSE PRESENT: ICD-10-CM

## 2023-05-01 DIAGNOSIS — E11.9 TYPE 2 DIABETES MELLITUS WITHOUT COMPLICATION, WITHOUT LONG-TERM CURRENT USE OF INSULIN (H): ICD-10-CM

## 2023-05-01 DIAGNOSIS — E78.5 HYPERLIPIDEMIA LDL GOAL <100: ICD-10-CM

## 2023-05-01 DIAGNOSIS — Z12.31 ENCOUNTER FOR SCREENING MAMMOGRAM FOR MALIGNANT NEOPLASM OF BREAST: ICD-10-CM

## 2023-05-01 DIAGNOSIS — F17.200 NICOTINE DEPENDENCE, UNCOMPLICATED, UNSPECIFIED NICOTINE PRODUCT TYPE: ICD-10-CM

## 2023-05-01 LAB
ALBUMIN SERPL BCG-MCNC: 4.5 G/DL (ref 3.5–5.2)
ALBUMIN UR-MCNC: NEGATIVE MG/DL
ALP SERPL-CCNC: 74 U/L (ref 35–104)
ALT SERPL W P-5'-P-CCNC: 15 U/L (ref 10–35)
ANION GAP SERPL CALCULATED.3IONS-SCNC: 13 MMOL/L (ref 7–15)
APPEARANCE UR: CLEAR
AST SERPL W P-5'-P-CCNC: 21 U/L (ref 10–35)
BILIRUB SERPL-MCNC: 0.5 MG/DL
BILIRUB UR QL STRIP: NEGATIVE
BUN SERPL-MCNC: 13.3 MG/DL (ref 8–23)
CALCIUM SERPL-MCNC: 10.3 MG/DL (ref 8.8–10.2)
CHLORIDE SERPL-SCNC: 104 MMOL/L (ref 98–107)
CHOLEST SERPL-MCNC: 154 MG/DL
COLOR UR AUTO: YELLOW
CREAT SERPL-MCNC: 0.8 MG/DL (ref 0.51–0.95)
CREAT UR-MCNC: 171.8 MG/DL
DEPRECATED HCO3 PLAS-SCNC: 25 MMOL/L (ref 22–29)
EST. AVERAGE GLUCOSE BLD GHB EST-MCNC: 114 MG/DL
GFR SERPL CREATININE-BSD FRML MDRD: 78 ML/MIN/1.73M2
GLUCOSE SERPL-MCNC: 98 MG/DL (ref 70–99)
GLUCOSE UR STRIP-MCNC: NEGATIVE MG/DL
HBA1C MFR BLD: 5.6 %
HDLC SERPL-MCNC: 47 MG/DL
HGB UR QL STRIP: NEGATIVE
HOLD SPECIMEN: NORMAL
KETONES UR STRIP-MCNC: NEGATIVE MG/DL
LDLC SERPL CALC-MCNC: 61 MG/DL
LEUKOCYTE ESTERASE UR QL STRIP: NEGATIVE
MICROALBUMIN UR-MCNC: 14.6 MG/L
MICROALBUMIN/CREAT UR: 8.5 MG/G CR (ref 0–25)
NITRATE UR QL: NEGATIVE
NONHDLC SERPL-MCNC: 107 MG/DL
PH UR STRIP: 6 [PH] (ref 5–7)
POTASSIUM SERPL-SCNC: 3.7 MMOL/L (ref 3.4–5.3)
PROT SERPL-MCNC: 8 G/DL (ref 6.4–8.3)
SODIUM SERPL-SCNC: 142 MMOL/L (ref 136–145)
SP GR UR STRIP: 1.02 (ref 1–1.03)
T4 FREE SERPL-MCNC: 1.17 NG/DL (ref 0.9–1.7)
TRIGL SERPL-MCNC: 232 MG/DL
TSH SERPL DL<=0.005 MIU/L-ACNC: 6.82 UIU/ML (ref 0.3–4.2)
UROBILINOGEN UR STRIP-ACNC: 1 E.U./DL

## 2023-05-01 PROCEDURE — 80053 COMPREHEN METABOLIC PANEL: CPT | Mod: ZL

## 2023-05-01 PROCEDURE — 83036 HEMOGLOBIN GLYCOSYLATED A1C: CPT | Mod: ZL

## 2023-05-01 PROCEDURE — 80061 LIPID PANEL: CPT | Mod: ZL

## 2023-05-01 PROCEDURE — 84439 ASSAY OF FREE THYROXINE: CPT | Mod: ZL

## 2023-05-01 PROCEDURE — 81003 URINALYSIS AUTO W/O SCOPE: CPT | Mod: ZL | Performed by: NURSE PRACTITIONER

## 2023-05-01 PROCEDURE — 99406 BEHAV CHNG SMOKING 3-10 MIN: CPT | Performed by: NURSE PRACTITIONER

## 2023-05-01 PROCEDURE — G0296 VISIT TO DETERM LDCT ELIG: HCPCS | Performed by: NURSE PRACTITIONER

## 2023-05-01 PROCEDURE — 99215 OFFICE O/P EST HI 40 MIN: CPT | Performed by: NURSE PRACTITIONER

## 2023-05-01 PROCEDURE — 82570 ASSAY OF URINE CREATININE: CPT | Mod: ZL | Performed by: NURSE PRACTITIONER

## 2023-05-01 PROCEDURE — 84443 ASSAY THYROID STIM HORMONE: CPT | Mod: ZL

## 2023-05-01 PROCEDURE — G0463 HOSPITAL OUTPT CLINIC VISIT: HCPCS | Mod: 25

## 2023-05-01 PROCEDURE — 36415 COLL VENOUS BLD VENIPUNCTURE: CPT | Mod: ZL

## 2023-05-01 RX ORDER — METOPROLOL TARTRATE 50 MG
TABLET ORAL
Qty: 180 TABLET | Refills: 1 | Status: SHIPPED | OUTPATIENT
Start: 2023-05-01 | End: 2023-08-10

## 2023-05-01 RX ORDER — AMLODIPINE BESYLATE 10 MG/1
10 TABLET ORAL DAILY
Qty: 90 TABLET | Refills: 1 | Status: SHIPPED | OUTPATIENT
Start: 2023-05-01 | End: 2023-07-28

## 2023-05-01 RX ORDER — CITALOPRAM HYDROBROMIDE 20 MG/1
20 TABLET ORAL DAILY
Qty: 90 TABLET | Refills: 1 | Status: SHIPPED | OUTPATIENT
Start: 2023-05-01 | End: 2023-05-01

## 2023-05-01 RX ORDER — CITALOPRAM HYDROBROMIDE 20 MG/1
TABLET ORAL
Qty: 135 TABLET | Refills: 1 | Status: SHIPPED | OUTPATIENT
Start: 2023-05-01 | End: 2024-02-14

## 2023-05-01 RX ORDER — LOSARTAN POTASSIUM 100 MG/1
TABLET ORAL
Qty: 90 TABLET | Refills: 1 | Status: SHIPPED | OUTPATIENT
Start: 2023-05-01 | End: 2023-07-28

## 2023-05-01 ASSESSMENT — ANXIETY QUESTIONNAIRES
8. IF YOU CHECKED OFF ANY PROBLEMS, HOW DIFFICULT HAVE THESE MADE IT FOR YOU TO DO YOUR WORK, TAKE CARE OF THINGS AT HOME, OR GET ALONG WITH OTHER PEOPLE?: EXTREMELY DIFFICULT
2. NOT BEING ABLE TO STOP OR CONTROL WORRYING: MORE THAN HALF THE DAYS
8. IF YOU CHECKED OFF ANY PROBLEMS, HOW DIFFICULT HAVE THESE MADE IT FOR YOU TO DO YOUR WORK, TAKE CARE OF THINGS AT HOME, OR GET ALONG WITH OTHER PEOPLE?: EXTREMELY DIFFICULT
GAD7 TOTAL SCORE: 9
7. FEELING AFRAID AS IF SOMETHING AWFUL MIGHT HAPPEN: NOT AT ALL
GAD7 TOTAL SCORE: 9
1. FEELING NERVOUS, ANXIOUS, OR ON EDGE: SEVERAL DAYS
7. FEELING AFRAID AS IF SOMETHING AWFUL MIGHT HAPPEN: NOT AT ALL
7. FEELING AFRAID AS IF SOMETHING AWFUL MIGHT HAPPEN: NOT AT ALL
4. TROUBLE RELAXING: MORE THAN HALF THE DAYS
6. BECOMING EASILY ANNOYED OR IRRITABLE: MORE THAN HALF THE DAYS
3. WORRYING TOO MUCH ABOUT DIFFERENT THINGS: MORE THAN HALF THE DAYS
4. TROUBLE RELAXING: MORE THAN HALF THE DAYS
3. WORRYING TOO MUCH ABOUT DIFFERENT THINGS: MORE THAN HALF THE DAYS
5. BEING SO RESTLESS THAT IT IS HARD TO SIT STILL: NOT AT ALL
IF YOU CHECKED OFF ANY PROBLEMS ON THIS QUESTIONNAIRE, HOW DIFFICULT HAVE THESE PROBLEMS MADE IT FOR YOU TO DO YOUR WORK, TAKE CARE OF THINGS AT HOME, OR GET ALONG WITH OTHER PEOPLE: EXTREMELY DIFFICULT
1. FEELING NERVOUS, ANXIOUS, OR ON EDGE: SEVERAL DAYS
6. BECOMING EASILY ANNOYED OR IRRITABLE: MORE THAN HALF THE DAYS
GAD7 TOTAL SCORE: 9
7. FEELING AFRAID AS IF SOMETHING AWFUL MIGHT HAPPEN: NOT AT ALL
GAD7 TOTAL SCORE: 9
5. BEING SO RESTLESS THAT IT IS HARD TO SIT STILL: NOT AT ALL
GAD7 TOTAL SCORE: 9
IF YOU CHECKED OFF ANY PROBLEMS ON THIS QUESTIONNAIRE, HOW DIFFICULT HAVE THESE PROBLEMS MADE IT FOR YOU TO DO YOUR WORK, TAKE CARE OF THINGS AT HOME, OR GET ALONG WITH OTHER PEOPLE: EXTREMELY DIFFICULT
2. NOT BEING ABLE TO STOP OR CONTROL WORRYING: MORE THAN HALF THE DAYS
GAD7 TOTAL SCORE: 9

## 2023-05-01 ASSESSMENT — PATIENT HEALTH QUESTIONNAIRE - PHQ9
10. IF YOU CHECKED OFF ANY PROBLEMS, HOW DIFFICULT HAVE THESE PROBLEMS MADE IT FOR YOU TO DO YOUR WORK, TAKE CARE OF THINGS AT HOME, OR GET ALONG WITH OTHER PEOPLE: EXTREMELY DIFFICULT
SUM OF ALL RESPONSES TO PHQ QUESTIONS 1-9: 19
10. IF YOU CHECKED OFF ANY PROBLEMS, HOW DIFFICULT HAVE THESE PROBLEMS MADE IT FOR YOU TO DO YOUR WORK, TAKE CARE OF THINGS AT HOME, OR GET ALONG WITH OTHER PEOPLE: EXTREMELY DIFFICULT
SUM OF ALL RESPONSES TO PHQ QUESTIONS 1-9: 19

## 2023-05-01 ASSESSMENT — PAIN SCALES - GENERAL: PAINLEVEL: NO PAIN (0)

## 2023-05-01 NOTE — PATIENT INSTRUCTIONS
Assessment & Plan          Type 2 diabetes mellitus without complication, without long-term current use of insulin (H)  - Lipid Profile (Chol, Trig, HDL, LDL calc); Future  - Comprehensive metabolic panel; Future  - TSH with free T4 reflex; Future  - Hemoglobin A1c; Future  - Albumin Random Urine Quantitative with Creat Ratio; Future      Benign essential hypertension  - Lipid Profile (Chol, Trig, HDL, LDL calc); Future  - Comprehensive metabolic panel; Future  - TSH with free T4 reflex; Future  - UA Macroscopic with reflex to Microscopic and Culture; Future  - metoprolol tartrate (LOPRESSOR) 50 MG tablet; TAKE 1 TABLET BY MOUTH TWICE A DAY FOR HEART AND BLOOD PRESSURE.  - losartan (COZAAR) 100 MG tablet; TAKE 1 TABLET (100 MG) BY MOUTH DAILY FOR BLOOD PRESSURE  - amLODIPine (NORVASC) 10 MG tablet; Take 1 tablet (10 mg) by mouth daily      Hyperlipidemia LDL goal <100  - Lipid Profile (Chol, Trig, HDL, LDL calc); Future  - Comprehensive metabolic panel; Future      Current mild episode of major depressive disorder without prior episode (H)  - citalopram (CELEXA) 20 MG tablet; 1.5 tabs po qd      Personal history of tobacco use  - SMOKING CESSATION COUNSELING 3-10 MIN  - Prof fee: Shared Decision Making for Lung Cancer Screening  - CT Chest Lung Cancer Scrn Low Dose wo; Future      Menopause present  - DX Hip/Pelvis/Spine; Future      Advanced directives, counseling/discussion  - Paperwork given      Encounter for screening mammogram for malignant neoplasm of breast  - MA Screen Bilateral w/Abhinav; Future      Special screening for malignant neoplasms, colon  - Adult General Surg Referral      Nicotine dependence, uncomplicated, unspecified nicotine product type  - Discussed quit options            Return in about 6 months (around 11/1/2023).        Ana Valdez CNP  [facility]          Lung Cancer Screening   Frequently Asked Questions  If you are at high-risk for lung cancer, getting screened  with low-dose computed tomography (LDCT) every year can help save your life. This handout offers answers to some of the most common questions about lung cancer screening. If you have other questions, please call [phone] ([phone]).     What is it?  Lung cancer screening uses special X-ray technology to create an image of your lung tissue. The exam is quick and easy and takes less than 10 seconds. We don t give you any medicine or use any needles. You can eat before and after the exam. You don t need to change your clothes as long as the clothing on your chest doesn t contain metal. But, you do need to be able to hold your breath for at least 6 seconds during the exam.    What is the goal of lung cancer screening?  The goal of lung cancer screening is to save lives. Many times, lung cancer is not found until a person starts having physical symptoms. Lung cancer screening can help detect lung cancer in the earliest stages when it may be easier to treat.    Who should be screened for lung cancer?  We suggest lung cancer screening for anyone who is at high-risk for lung cancer. You are in the high-risk group if you:     are between the ages of 55 and 79, and   have smoked at least 1 pack of cigarettes a day for 20 or more years, and   still smoke or have quit within the past 15 years.    However, if you have a new cough or shortness of breath, you should talk to your doctor before being screened.    Why does it matter if I have symptoms?  Certain symptoms can be a sign that you have a condition in your lungs that should be checked and treated by your doctor. These symptoms include fever, chest pain, a new or changing cough, shortness of breath that you have never felt before, coughing up blood or unexplained weight loss. Having any of these symptoms can greatly affect the results of lung cancer screening.       Should all smokers get an LDCT lung cancer screening exam?  It depends. Lung cancer screening  is for a very specific group of men and women who have a history of heavy smoking over a long period of time (see  Who should be screened for lung cancer  above).  I am in the high-risk group, but have been diagnosed with cancer in the past. Is LDCT lung cancer screening right for me?  In some cases, you should not have LDCT lung screening, such as when your doctor is already following your cancer with CT scan studies. Your doctor will help you decide if LDCT lung screening is right for you.  Do I need to have a screening exam every year?  Yes. If you are in the high-risk group described earlier, you should get an LDCT lung cancer screening exam every year until you are 79, or are no longer willing or able to undergo screening and possible procedures to diagnose and treat lung cancer.  How effective is LDCT at preventing death from lung cancer?  Studies have shown that LDCT lung cancer screening can lower the risk of death from lung cancer by 20 percent in people who are at high-risk.  What are the risks?  There are some risks and limitations of LDCT lung cancer screening. We want to make sure you understand the risks and benefits, so please let us know if you have any questions. Your doctor may want to talk with you more about these risks.   Radiation exposure: As with any exam that uses radiation, there is a very small increased risk of cancer. The amount of radiation in LDCT is small--about the same amount a person would get from a mammogram. Your doctor orders the exam when he or she feels the potential benefits outweigh the risks.   False negatives: No test is perfect, including LDCT. It is possible that you may have a medical condition, including lung cancer, that is not found during your exam. This is called a false negative result.   False positives and more testing: LDCT very often finds something in the lung that could be cancer, but in fact is not. This is called a false positive result. False positive  tests often cause anxiety. To make sure these findings are not cancer, you may need to have more tests. These tests will be done only if you give us permission. Sometimes patients need a treatment that can have side effects, such as a biopsy. For more information on false positives, see  What can I expect from the results?    Findings not related to lung cancer: Your LDCT exam also takes pictures of areas of your body next to your lungs. In a very small number of cases, the CT scan will show an abnormal finding in one of these areas, such as your kidneys, adrenal glands, liver or thyroid. This finding may not be serious, but you may need more tests. Your doctor can help you decide what other tests you may need, if any.  What can I expect from the results?  About 1 out of 4 LDCT exams will find something that may need more tests. Most of the time, these findings are lung nodules. Lung nodules are very small collections of tissue in the lung. These nodules are very common, and the vast majority--more than 97 percent--are not cancer (benign). Most are normal lymph nodes or small areas of scarring from past infections.  But, if a small lung nodule is found to be cancer, the cancer can be cured more than 90 percent of the time. To know if the nodule is cancer, we may need to get more images before your next yearly screening exam. If the nodule has suspicious features (for example, it is large, has an odd shape or grows over time), we will refer you to a specialist for further testing.  Will my doctor also get the results?  Yes. Your doctor will get a copy of your results.  Is it okay to keep smoking now that there s a cancer screening exam?  No. Tobacco is one of the strongest cancer-causing agents. It causes not only lung cancer, but other cancers and cardiovascular (heart) diseases as well. The damage caused by smoking builds over time. This means that the longer you smoke, the higher your risk of disease. While it is  never too late to quit, the sooner you quit, the better.  Where can I find help to quit smoking?  The best way to prevent lung cancer is to stop smoking. If you have already quit smoking, congratulations and keep it up! For help on quitting smoking, please call [employer] at 2-808-QUIT-NOW (1-691.446.9227) or the American Cancer Society at 1-349.473.4402 to find local resources near you.  One-on-one health coaching:  If you d prefer to work individually with a health care provider on tobacco cessation, we offer:     Medication Therapy Management:  Our specially trained pharmacists work closely with you and your doctor to help you quit smoking.  Call [phone] or [phone] (toll free).      Quit-Smoking Tools: Help for Kicking Your Habit    If you're a smoker and you need some more reasons to quit, look at what the U.[place]. Surgeon General has to say: If you stop now, you'll have a better quality of life and more years to live it.   As you likely already know, quitting smoking isn't easy. But millions of other people have done it. And you can, too. Quit-smoking aids, such as those listed here, can increase your chance of success:   Quit lines. When you call a quit line, you can talk with someone who's trained to help people quit smoking. It's free. And you can call almost any time. Find a quit line by calling the American Cancer Society.  Nicotine patches. They give you a measured dose of nicotine through your skin to fight cravings. And you can buy patches without a prescription. Several types and strengths are available. The 1 you choose depends on your body size. And it also depends on how much you smoked. Try to slowly decrease your dose. These patches have been known to cause trouble sleeping. If this happens to you, remove the patch before going to bed. Replace it when you wake up.  Nicotine gum. This fast-acting form of nicotine replacement doesn't need a prescription. And it comes in 2 strengths: 2 mg and  4 mg. Chew the gum slowly until it tastes peppery. Then place the gum against your cheek. Switch between chewing it and placing it next to your cheek. Do this for about 20 to 30 minutes. But don't eat or drink anything when using the gum. This reduces nicotine absorption. Scheduling your doses throughout the day may work better to calm your cravings.  Nicotine nasal spray. A prescription nasal spray sends nicotine quickly to the bloodstream. So, it eases withdrawal symptoms right away. The spray offers a sense of control over your cravings. Most smokers using it report great results. But it can cause sneezing and watery eyes because it tastes peppery. The FDA advises using it for up to 6 months only.  Nicotine inhalers. Using this prescription device is like smoking a cigarette. When you puff on the inhaler, a cartridge inside the plastic tube gives off nicotine. But the medicine doesn't go into your lungs. It's delivered to your mouth for quick absorption.  Nicotine lozenges. These over-the-counter lozenges also are available in 2-mg and 4-mg strengths. You decide which dose to take. This is based on when you often had your first cigarette of the day. You'll absorb less nicotine if you eat or drink while using a lozenge.  Bupropion. This non-nicotine prescription medicine affects chemicals that are responsible for cravings. So, it reduces withdrawal symptoms. It has the active ingredient bupropion. This is used as an antidepressant. You can use it alone or with nicotine-replacement therapy.  Varenicline. This oral prescription medicine reduces nicotine withdrawal symptoms. It also decreases the pleasure you get from smoking. Side effects can include changes in mood or behavior. It's important to use this medicine under medical supervision.  Quit-smoking aids can help you have a smoke-free future. But it's also smart to develop a plan to change your personal habits. And to set up a network of emotional support. Turn  to family and friends, and your healthcare provider. They can give you valuable information on quitting. Also go to www.smokefree.gov for support and tips.     8332-7609 [employer]. All rights reserved. This information is not intended as a substitute for professional medical care. Always follow your healthcare professional's instructions.

## 2023-05-09 ENCOUNTER — TELEPHONE (OUTPATIENT)
Dept: FAMILY MEDICINE | Facility: OTHER | Age: 73
End: 2023-05-09

## 2023-07-26 DIAGNOSIS — E78.5 HYPERLIPIDEMIA LDL GOAL <100: ICD-10-CM

## 2023-07-26 DIAGNOSIS — I10 BENIGN ESSENTIAL HYPERTENSION: ICD-10-CM

## 2023-07-26 NOTE — TELEPHONE ENCOUNTER
Amlodipine 10mg      Last Written Prescription Date:  5/01/2023  Last Fill Quantity: 90,   # refills: 1  Last Office Visit: 5/01/2023  Future Office visit:       Routing refill request to provider for review/approval because:  Drug not on the [facility], [facility] or [facility] refill protocol or controlled substance  Losartan 100mg  Last Written Prescription Date:  5/01/2023  Last Fill Quantity: 90,   # refills: 1  Last Office Visit: 5/01/2023  Future Office visit:       Routing refill request to provider for review/approval because:  Drug not on the [facility], [facility] or [facility] refill protocol or controlled substance  Simvastatin 40mg  Last Written Prescription Date:  1/06/2023  Last Fill Quantity: 90,   # refills: 1  Last Office Visit: 5/01/2023  Future Office visit:       Routing refill request to provider for review/approval because:  Drug not on the [facility], [facility] or [facility] refill protocol or controlled substance

## 2023-07-28 RX ORDER — SIMVASTATIN 40 MG
TABLET ORAL
Qty: 90 TABLET | Refills: 2 | Status: SHIPPED | OUTPATIENT
Start: 2023-07-28 | End: 2024-05-10

## 2023-07-28 RX ORDER — LOSARTAN POTASSIUM 100 MG/1
TABLET ORAL
Qty: 90 TABLET | Refills: 2 | Status: SHIPPED | OUTPATIENT
Start: 2023-07-28 | End: 2024-05-10

## 2023-07-28 RX ORDER — AMLODIPINE BESYLATE 10 MG/1
10 TABLET ORAL DAILY
Qty: 90 TABLET | Refills: 2 | Status: SHIPPED | OUTPATIENT
Start: 2023-07-28 | End: 2024-05-10

## 2023-08-09 DIAGNOSIS — I10 BENIGN ESSENTIAL HYPERTENSION: ICD-10-CM

## 2023-08-09 NOTE — TELEPHONE ENCOUNTER
Metoprolol Tartrate (Lopressor) 50 MG tablet    Last Written Prescription Date:  05/01/2023  Last Fill Quantity: 180,   # refills: 1  Last Office Visit: 05/01/2023

## 2023-08-10 RX ORDER — METOPROLOL TARTRATE 50 MG
TABLET ORAL
Qty: 180 TABLET | Refills: 2 | Status: SHIPPED | OUTPATIENT
Start: 2023-08-10 | End: 2024-05-10

## 2023-10-19 DIAGNOSIS — R73.03 PREDIABETES: ICD-10-CM

## 2023-10-20 NOTE — TELEPHONE ENCOUNTER
Metformin      Last Written Prescription Date:  3/17/23  Last Fill Quantity: 180,   # refills: 1  Last Office Visit: 5/1/23  Future Office visit:       Routing refill request to provider for review/approval because:

## 2023-10-23 RX ORDER — METFORMIN HCL 500 MG
500 TABLET, EXTENDED RELEASE 24 HR ORAL 2 TIMES DAILY WITH MEALS
Qty: 180 TABLET | Refills: 1 | Status: SHIPPED | OUTPATIENT
Start: 2023-10-23 | End: 2024-05-01

## 2024-02-13 DIAGNOSIS — F32.0 CURRENT MILD EPISODE OF MAJOR DEPRESSIVE DISORDER WITHOUT PRIOR EPISODE (H): ICD-10-CM

## 2024-02-13 NOTE — TELEPHONE ENCOUNTER
Celexa       Last Written Prescription Date:  5/01/2023  Last Fill Quantity: 135,   # refills: 1  Last Office Visit: 5/01/2023  Future Office visit:

## 2024-02-14 RX ORDER — CITALOPRAM HYDROBROMIDE 20 MG/1
TABLET ORAL
Qty: 45 TABLET | Refills: 0 | Status: SHIPPED | OUTPATIENT
Start: 2024-02-14 | End: 2024-05-10

## 2024-02-14 NOTE — TELEPHONE ENCOUNTER
SSRIs Protocol [id number] 09:38 AM   Protocol Details PHQ-9 score less than 5 in past 6 months    Recent (6 mo) or future (30 days) visit within the authorizing provider's specialty         3/4/2022     2:00 PM 9/7/2022     1:00 PM 5/1/2023     2:18 PM   PHQ   PHQ-9 Total Score 1 2 19    19   Q9: Thoughts of better off dead/self-harm past 2 weeks Not at all Not at all Not at all

## 2024-03-12 PROBLEM — E11.9 TYPE 2 DIABETES MELLITUS WITHOUT COMPLICATION, WITHOUT LONG-TERM CURRENT USE OF INSULIN (H): Status: RESOLVED | Noted: 2020-01-14 | Resolved: 2021-03-01

## 2024-03-12 PROBLEM — R73.03 PREDIABETES: Status: ACTIVE | Noted: 2021-03-01

## 2024-05-09 NOTE — PROGRESS NOTES
Assessment & Plan         Prediabetes  - Hemoglobin A1c; Future      Benign essential hypertension  - Comprehensive metabolic panel; Future  - Lipid Profile (Chol, Trig, HDL, LDL calc); Future  - TSH with free T4 reflex; Future  - UA Macroscopic with reflex to Microscopic and Culture; Future  - amLODIPine (NORVASC) 10 MG tablet; Take 1 tablet (10 mg) by mouth daily  - losartan (COZAAR) 100 MG tablet; TAKE 1 TABLET (100 MG) BY MOUTH DAILY FOR BLOOD PRESSURE  - metoprolol tartrate (LOPRESSOR) 50 MG tablet; TAKE 1 TABLET BY MOUTH TWICE A DAY FOR HEART AND BLOOD PRESSURE.      Hyperlipidemia LDL goal <100  - Comprehensive metabolic panel; Future  - Lipid Profile (Chol, Trig, HDL, LDL calc); Future  - TSH with free T4 reflex; Future  - simvastatin (ZOCOR) 40 MG tablet; Take 1 tablet (40 mg) by mouth at bedtime      RIGHT Thyroid nodule  - US Thyroid; Future      Current mild episode of major depressive disorder without prior episode (H24)  - citalopram (CELEXA) 20 MG tablet; TAKE 1.5 TABLETS (30MG) BY MOUTH ONCE DAILY.      Encounter for screening mammogram for breast cancer  - MA Screen Bilateral w/Abhinav; Future      Special screening for malignant neoplasms, colon  - Colonoscopy Screening  Referral; Future      Nicotine dependence, uncomplicated, unspecified nicotine product type  - Quit support printed      Personal history of tobacco use  - Prof fee: Shared Decision Making for Lung Cancer Screening  - CT Chest Lung Cancer Scrn Low Dose wo; Future        Please continue to take all medication as directed  Please notify your pharmacy or our refill line of future refills needed.  Please return sooner than your next scheduled appointment with any concerns.        When your lab results return, we will call you with abnormal findings  You can also view this information in your MyChart, if you have an account.  Please call or [employer] message us with any questions you may have.          Nicotine/Tobacco  Cessation  She reports that she has been smoking cigarettes. She started smoking about 52 years ago. She has a 52.4 pack-year smoking history. She has never used smokeless tobacco.  Nicotine/Tobacco Cessation Plan  Self help information given to patient          Return in about 6 months (around 11/10/2024), or Medicae physical and CDM.        Ana Valdez -[facility]  [phone]             Mario is a 73 year old, presenting for the following health issues:  Chronic Disease Management        Hyperlipidemia Follow-Up  Are you regularly taking any medication or supplement to lower your cholesterol?   Yes- simvastatin 40 mg daily  Are you having muscle aches or other side effects that you think could be caused by your cholesterol lowering medication?  No        Hypertension Follow-up  Do you check your blood pressure regularly outside of the clinic? No   Are you following a low salt diet? Yes  Are your blood pressures ever more than 140 on the top number (systolic) OR more   than 90 on the bottom number (diastolic), for example 140/90? No        Depression   How are you doing with your depression since your last visit? No change  Are you having other symptoms that might be associated with depression? No  Have you had a significant life event?  Grief or Loss   Are you feeling anxious or having panic attacks?   No  Do you have any concerns with your use of alcohol or other drugs? No        Pre -diabetes  Last A1C 5/2023  Will recheck today          Social History     Tobacco Use    Smoking status: Heavy Smoker     Current packs/day: 1.00     Average packs/day: 1 pack/day for 52.4 years (52.4 ttl pk-yrs)     Types: Cigarettes     Start date: 1/1/1972    Smokeless tobacco: Never    Tobacco comments:     pt is slowly cutting down- 1 pack a day    Vaping Use    Vaping status: Never Used   Substance Use Topics    Alcohol use: No    Drug use: No             9/7/2022     1:00 PM 5/1/2023     2:18 PM 5/10/2024     2:57 PM   PHQ   PHQ-9  Total Score 2 19    19 8   Q9: Thoughts of better off dead/self-harm past 2 weeks Not at all Not at all Not at all             3/4/2022     2:00 PM 9/7/2022     2:15 PM 5/1/2023     2:20 PM   ORTEGA-7 SCORE   Total Score   9 (mild anxiety)   Total Score 1 0 9    9         Patient Active Problem List   Diagnosis    Benign essential hypertension    Tobacco abuse    Hyperlipidemia LDL goal <100    Taking a statin medication    Morbid obesity (H)    Calculus of kidney    Current mild episode of major depressive disorder without prior episode (H24)    RIGHT Thyroid nodule    History of partial left thyroid lobectomy    Advanced directives, counseling/discussion    Prediabetes     Past Surgical History:   Procedure Laterality Date    ABDOMEN SURGERY      2 c-sections    CHOLECYSTECTOMY  1994    GYN SURGERY      total hyst,, no cervix    HEAD & NECK SURGERY      tumor neck, benign    ORTHOPEDIC SURGERY Bilateral     carpul tunnel     ZZHC REMOVAL OF NAIL PLATE SIMPLE SINGLE  04/19/2018    removal of 1/4  left gt toenail, local        Social History     Tobacco Use    Smoking status: Heavy Smoker     Current packs/day: 1.00     Average packs/day: 1 pack/day for 52.4 years (52.4 ttl pk-yrs)     Types: Cigarettes     Start date: 1/1/1972    Smokeless tobacco: Never    Tobacco comments:     pt is slowly cutting down- 1 pack a day    Substance Use Topics    Alcohol use: No     Family History   Problem Relation Age of Onset    Other Cancer Mother     Other Cancer Brother     Breast Cancer Paternal Aunt            Current Outpatient Medications   Medication Sig Dispense Refill    amLODIPine (NORVASC) 10 MG tablet TAKE 1 TABLET (10 MG) BY MOUTH DAILY 90 tablet 2    aspirin 81 MG EC tablet Take 1 tablet (81 mg) by mouth daily 90 tablet 3    capsaicin (ZOSTRIX) 0.025 % external cream Apply 1 g topically 3 times daily 120 g 3    cholecalciferol (VITAMIN D3) 5000 units TABS tablet Take 1 tablet (5,000 Units) by mouth daily 90 tablet 11     citalopram (CELEXA) 20 MG tablet TAKE 1.5 TABLETS (30MG) BY MOUTH ONCE DAILY. 45 tablet 0    losartan (COZAAR) 100 MG tablet TAKE 1 TABLET (100 MG) BY MOUTH DAILY FOR BLOOD PRESSURE 90 tablet 2    metFORMIN (GLUCOPHAGE XR) 500 MG 24 hr tablet TAKE 1 TABLET (500 MG) BY MOUTH 2 TIMES DAILY (WITH MEALS) 60 tablet 0    metoprolol tartrate (LOPRESSOR) 50 MG tablet TAKE 1 TABLET BY MOUTH TWICE A DAY FOR HEART AND BLOOD PRESSURE. 180 tablet 2    Multiple Vitamins-Iron (DAILY MULTIPLE VITAMIN/IRON) TABS Take 1 tablet by mouth daily 90 tablet 11    nystatin (MYCOSTATIN) 121572 UNIT/GM external cream Apply topically 2 times daily 60 g 1    omega 3 1000 MG CAPS 3 po daily (Patient taking differently: 1 capsule daily) 90 capsule 11    simvastatin (ZOCOR) 40 MG tablet TAKE 1 TABLET (40MG) BY MOUTH ONCE DAILY. 90 tablet 2       No Known Allergies      Recent Labs   Lab Test 05/01/23  1415 09/07/22  1355 03/04/22  1510 09/03/21  1408 03/01/21  1322 08/28/20  1330   A1C 5.6 5.7* 5.5   < > 5.6 5.9*   LDL 61 63 73   < > 82 85   HDL 47* 50 45*   < > 49* 42*   TRIG 232* 106 87   < > 130 150*   ALT 15 29 22   < > 27 25   CR 0.80 0.66 0.75   < > 0.78 0.72   GFRESTIMATED 78 >90 85   < > 78 86   GFRESTBLACK  --   --   --   --  >90 >90   POTASSIUM 3.7 4.2 3.9   < > 4.1 4.1   TSH 6.82* 3.01 5.12*   < > 4.00 2.86    < > = values in this interval not displayed.          BP Readings from Last 3 Encounters:   05/10/24 122/68   05/01/23 114/68   01/03/23 120/74    Wt Readings from Last 3 Encounters:   05/10/24 80.1 kg (176 lb 9.6 oz)   05/01/23 74 kg (163 lb 1.6 oz)   01/03/23 71.2 kg (157 lb)                 Review of Systems  Constitutional, HEENT, cardiovascular, pulmonary, GI, , musculoskeletal, neuro, skin, endocrine and psych systems are negative, except as otherwise noted.          Objective    /68 (BP Location: Left arm, Patient Position: Sitting, Cuff Size: Adult Regular)   Pulse 59   Temp 97.4  F (36.3  C) (Tympanic)    Resp 20   Wt 80.1 kg (176 lb 9.6 oz)   SpO2 90%   BMI 33.37 kg/m    Body mass index is 33.37 kg/m .          Physical Exam   GENERAL: alert and no distress  EYES: Eyes grossly normal to inspection, PERRL and conjunctivae and sclerae normal  HENT: ear canals and TM's normal, nose and mouth without ulcers or lesions  NECK: no adenopathy, no asymmetry, masses, or scars  RESP: lungs clear to auscultation - no rales, rhonchi or wheezes  CV: regular rate and rhythm, normal S1 S2, no S3 or S4, no murmur, click or rub, no peripheral edema  MS: no gross musculoskeletal defects noted, no edema  SKIN: no suspicious lesions or rashes  PSYCH: mentation appears normal, affect normal/bright          The longitudinal plan of care for the diagnosis(es)/condition(s) as documented were addressed during this visit. Due to the added complexity in care, I will continue to support Mario in the subsequent management and with ongoing continuity of care.           Lung Cancer Screening Shared Decision Making Visit     Aga Gary, a 73 year old female, is eligible for lung cancer screening    History   Smoking Status    Heavy Smoker    Types: Cigarettes   Smokeless Tobacco    Never       I have discussed with patient the risks and benefits of screening for lung cancer with low-dose CT.     The risks include:    radiation exposure: one low dose chest CT has as much ionizing radiation as about 15 chest x-rays, or 6 months of background radiation living in [place]      false positives: most findings/nodules are NOT cancer, but some might still require additional diagnostic evaluation, including biopsy    over-diagnosis: some slow growing cancers that might never have been clinically significant will be detected and treated unnecessarily     The benefit of early detection of lung cancer is contingent upon adherence to annual screening or more frequent follow up if indicated.     Furthermore, to benefit from screening, Aga must be  willing and able to undergo diagnostic procedures, if indicated. Although no specific guide is available for determining severity of comorbidities, it is reasonable to withhold screening in patients who have greater mortality risk from other diseases.     We did discuss that the best way to prevent lung cancer is to not smoke.    Some patients may value a numeric estimation of lung cancer risk when evaluating if lung cancer screening is right for them, here is one calculator:    ShouldIScreen        Signed Electronically by: Ana Valdez CNP

## 2024-05-10 ENCOUNTER — OFFICE VISIT (OUTPATIENT)
Dept: FAMILY MEDICINE | Facility: OTHER | Age: 74
End: 2024-05-10
Attending: NURSE PRACTITIONER
Payer: COMMERCIAL

## 2024-05-10 VITALS
TEMPERATURE: 97.4 F | WEIGHT: 176.6 LBS | HEART RATE: 59 BPM | OXYGEN SATURATION: 90 % | SYSTOLIC BLOOD PRESSURE: 122 MMHG | DIASTOLIC BLOOD PRESSURE: 68 MMHG | BODY MASS INDEX: 33.37 KG/M2 | RESPIRATION RATE: 20 BRPM

## 2024-05-10 DIAGNOSIS — Z12.11 SPECIAL SCREENING FOR MALIGNANT NEOPLASMS, COLON: ICD-10-CM

## 2024-05-10 DIAGNOSIS — Z12.31 ENCOUNTER FOR SCREENING MAMMOGRAM FOR BREAST CANCER: ICD-10-CM

## 2024-05-10 DIAGNOSIS — E78.5 HYPERLIPIDEMIA LDL GOAL <100: ICD-10-CM

## 2024-05-10 DIAGNOSIS — F32.0 CURRENT MILD EPISODE OF MAJOR DEPRESSIVE DISORDER WITHOUT PRIOR EPISODE (H): ICD-10-CM

## 2024-05-10 DIAGNOSIS — I10 BENIGN ESSENTIAL HYPERTENSION: Primary | ICD-10-CM

## 2024-05-10 DIAGNOSIS — E04.1 THYROID NODULE: ICD-10-CM

## 2024-05-10 DIAGNOSIS — R73.03 PREDIABETES: ICD-10-CM

## 2024-05-10 DIAGNOSIS — F17.200 NICOTINE DEPENDENCE, UNCOMPLICATED, UNSPECIFIED NICOTINE PRODUCT TYPE: ICD-10-CM

## 2024-05-10 DIAGNOSIS — Z87.891 PERSONAL HISTORY OF TOBACCO USE: ICD-10-CM

## 2024-05-10 LAB
ALBUMIN SERPL BCG-MCNC: 4.3 G/DL (ref 3.5–5.2)
ALBUMIN UR-MCNC: NEGATIVE MG/DL
ALP SERPL-CCNC: 66 U/L (ref 40–150)
ALT SERPL W P-5'-P-CCNC: 21 U/L (ref 0–50)
ANION GAP SERPL CALCULATED.3IONS-SCNC: 14 MMOL/L (ref 7–15)
APPEARANCE UR: CLEAR
AST SERPL W P-5'-P-CCNC: 27 U/L (ref 0–45)
BILIRUB SERPL-MCNC: 0.8 MG/DL
BILIRUB UR QL STRIP: NEGATIVE
BUN SERPL-MCNC: 14.3 MG/DL (ref 8–23)
CALCIUM SERPL-MCNC: 9.8 MG/DL (ref 8.8–10.2)
CHLORIDE SERPL-SCNC: 102 MMOL/L (ref 98–107)
CHOLEST SERPL-MCNC: 129 MG/DL
COLOR UR AUTO: YELLOW
CREAT SERPL-MCNC: 0.82 MG/DL (ref 0.51–0.95)
DEPRECATED HCO3 PLAS-SCNC: 23 MMOL/L (ref 22–29)
EGFRCR SERPLBLD CKD-EPI 2021: 76 ML/MIN/1.73M2
EST. AVERAGE GLUCOSE BLD GHB EST-MCNC: 123 MG/DL
GLUCOSE SERPL-MCNC: 128 MG/DL (ref 70–99)
GLUCOSE UR STRIP-MCNC: NEGATIVE MG/DL
HBA1C MFR BLD: 5.9 %
HDLC SERPL-MCNC: 38 MG/DL
HGB UR QL STRIP: NEGATIVE
HOLD SPECIMEN: NORMAL
KETONES UR STRIP-MCNC: NEGATIVE MG/DL
LDLC SERPL CALC-MCNC: 60 MG/DL
LEUKOCYTE ESTERASE UR QL STRIP: NEGATIVE
NITRATE UR QL: NEGATIVE
NONHDLC SERPL-MCNC: 91 MG/DL
PH UR STRIP: 6 [PH] (ref 5–7)
POTASSIUM SERPL-SCNC: 4.1 MMOL/L (ref 3.4–5.3)
PROT SERPL-MCNC: 7.9 G/DL (ref 6.4–8.3)
SODIUM SERPL-SCNC: 139 MMOL/L (ref 135–145)
SP GR UR STRIP: <=1.005 (ref 1–1.03)
T4 FREE SERPL-MCNC: 1.22 NG/DL (ref 0.9–1.7)
TRIGL SERPL-MCNC: 156 MG/DL
TSH SERPL DL<=0.005 MIU/L-ACNC: 6.96 UIU/ML (ref 0.3–4.2)
UROBILINOGEN UR STRIP-ACNC: 0.2 E.U./DL

## 2024-05-10 PROCEDURE — 83036 HEMOGLOBIN GLYCOSYLATED A1C: CPT | Mod: ZL | Performed by: NURSE PRACTITIONER

## 2024-05-10 PROCEDURE — 80053 COMPREHEN METABOLIC PANEL: CPT | Mod: ZL | Performed by: NURSE PRACTITIONER

## 2024-05-10 PROCEDURE — 81003 URINALYSIS AUTO W/O SCOPE: CPT | Mod: ZL | Performed by: NURSE PRACTITIONER

## 2024-05-10 PROCEDURE — G0296 VISIT TO DETERM LDCT ELIG: HCPCS | Performed by: NURSE PRACTITIONER

## 2024-05-10 PROCEDURE — 36415 COLL VENOUS BLD VENIPUNCTURE: CPT | Mod: ZL | Performed by: NURSE PRACTITIONER

## 2024-05-10 PROCEDURE — G0463 HOSPITAL OUTPT CLINIC VISIT: HCPCS | Mod: 25

## 2024-05-10 PROCEDURE — 80061 LIPID PANEL: CPT | Mod: ZL | Performed by: NURSE PRACTITIONER

## 2024-05-10 PROCEDURE — 99214 OFFICE O/P EST MOD 30 MIN: CPT | Performed by: NURSE PRACTITIONER

## 2024-05-10 PROCEDURE — 84443 ASSAY THYROID STIM HORMONE: CPT | Mod: ZL | Performed by: NURSE PRACTITIONER

## 2024-05-10 PROCEDURE — G2211 COMPLEX E/M VISIT ADD ON: HCPCS | Performed by: NURSE PRACTITIONER

## 2024-05-10 PROCEDURE — 84439 ASSAY OF FREE THYROXINE: CPT | Mod: ZL | Performed by: NURSE PRACTITIONER

## 2024-05-10 RX ORDER — CITALOPRAM HYDROBROMIDE 20 MG/1
TABLET ORAL
Qty: 45 TABLET | Refills: 3 | Status: SHIPPED | OUTPATIENT
Start: 2024-05-10

## 2024-05-10 RX ORDER — AMLODIPINE BESYLATE 10 MG/1
10 TABLET ORAL DAILY
Qty: 90 TABLET | Refills: 2 | Status: SHIPPED | OUTPATIENT
Start: 2024-05-10

## 2024-05-10 RX ORDER — METOPROLOL TARTRATE 50 MG
TABLET ORAL
Qty: 180 TABLET | Refills: 2 | Status: SHIPPED | OUTPATIENT
Start: 2024-05-10

## 2024-05-10 RX ORDER — LOSARTAN POTASSIUM 100 MG/1
TABLET ORAL
Qty: 90 TABLET | Refills: 2 | Status: SHIPPED | OUTPATIENT
Start: 2024-05-10

## 2024-05-10 RX ORDER — SIMVASTATIN 40 MG
40 TABLET ORAL AT BEDTIME
Qty: 90 TABLET | Refills: 2 | Status: SHIPPED | OUTPATIENT
Start: 2024-05-10

## 2024-05-10 ASSESSMENT — ANXIETY QUESTIONNAIRES
IF YOU CHECKED OFF ANY PROBLEMS ON THIS QUESTIONNAIRE, HOW DIFFICULT HAVE THESE PROBLEMS MADE IT FOR YOU TO DO YOUR WORK, TAKE CARE OF THINGS AT HOME, OR GET ALONG WITH OTHER PEOPLE: NOT DIFFICULT AT ALL
6. BECOMING EASILY ANNOYED OR IRRITABLE: NOT AT ALL
3. WORRYING TOO MUCH ABOUT DIFFERENT THINGS: NOT AT ALL
1. FEELING NERVOUS, ANXIOUS, OR ON EDGE: NOT AT ALL
5. BEING SO RESTLESS THAT IT IS HARD TO SIT STILL: NOT AT ALL
GAD7 TOTAL SCORE: 0
GAD7 TOTAL SCORE: 0
2. NOT BEING ABLE TO STOP OR CONTROL WORRYING: NOT AT ALL
7. FEELING AFRAID AS IF SOMETHING AWFUL MIGHT HAPPEN: NOT AT ALL

## 2024-05-10 ASSESSMENT — PATIENT HEALTH QUESTIONNAIRE - PHQ9
SUM OF ALL RESPONSES TO PHQ QUESTIONS 1-9: 8
10. IF YOU CHECKED OFF ANY PROBLEMS, HOW DIFFICULT HAVE THESE PROBLEMS MADE IT FOR YOU TO DO YOUR WORK, TAKE CARE OF THINGS AT HOME, OR GET ALONG WITH OTHER PEOPLE: SOMEWHAT DIFFICULT
SUM OF ALL RESPONSES TO PHQ QUESTIONS 1-9: 8
5. POOR APPETITE OR OVEREATING: NOT AT ALL

## 2024-05-10 ASSESSMENT — PAIN SCALES - GENERAL: PAINLEVEL: NO PAIN (0)

## 2024-05-10 NOTE — PATIENT INSTRUCTIONS
Assessment & Plan       Prediabetes  - Hemoglobin A1c; Future      Benign essential hypertension  - Comprehensive metabolic panel; Future  - Lipid Profile (Chol, Trig, HDL, LDL calc); Future  - TSH with free T4 reflex; Future  - UA Macroscopic with reflex to Microscopic and Culture; Future  - amLODIPine (NORVASC) 10 MG tablet; Take 1 tablet (10 mg) by mouth daily  - losartan (COZAAR) 100 MG tablet; TAKE 1 TABLET (100 MG) BY MOUTH DAILY FOR BLOOD PRESSURE  - metoprolol tartrate (LOPRESSOR) 50 MG tablet; TAKE 1 TABLET BY MOUTH TWICE A DAY FOR HEART AND BLOOD PRESSURE.      Hyperlipidemia LDL goal <100  - Comprehensive metabolic panel; Future  - Lipid Profile (Chol, Trig, HDL, LDL calc); Future  - TSH with free T4 reflex; Future  - simvastatin (ZOCOR) 40 MG tablet; Take 1 tablet (40 mg) by mouth at bedtime      RIGHT Thyroid nodule  - US Thyroid; Future      Current mild episode of major depressive disorder without prior episode (H24)  - citalopram (CELEXA) 20 MG tablet; TAKE 1.5 TABLETS (30MG) BY MOUTH ONCE DAILY.      Encounter for screening mammogram for breast cancer  - MA Screen Bilateral w/Abhinav; Future      Special screening for malignant neoplasms, colon  - Colonoscopy Screening  Referral; Future      Nicotine dependence, uncomplicated, unspecified nicotine product type  - Quit support printed      Personal history of tobacco use  - Prof fee: Shared Decision Making for Lung Cancer Screening  - CT Chest Lung Cancer Scrn Low Dose wo; Future        Please continue to take all medication as directed  Please notify your pharmacy or our refill line of future refills needed.  Please return sooner than your next scheduled appointment with any concerns.        When your lab results return, we will call you with abnormal findings  You can also view this information in your MyChart, if you have an account.  Please call or [employer] message us with any questions you may have.        Nicotine/Tobacco Cessation  She  reports that she has been smoking cigarettes. She started smoking about 52 years ago. She has a 52.4 pack-year smoking history. She has never used smokeless tobacco.  Nicotine/Tobacco Cessation Plan  Self help information given to patient          Return in about 6 months (around 11/10/2024), or Medicae physical and CDM.      Ana Valdez [facility]  [phone]       Quitting Tobacco: Care Instructions  Quitting tobacco is much harder than simply changing a habit. Nicotine cravings make it hard to quit, but you can do it. Your doctor will help you set up the plan that best meets your needs.    You will miss the nicotine at first. You may feel short-tempered and grumpy. You may have trouble sleeping or thinking clearly. The urge to use tobacco may continue for a time.    Combining tools can raise your chances of success. You can use medicine along with counseling. And you can join a quit-tobacco program, such as the American Lung Association's Freedom from Smoking program.    Get support.  Reach out to family and friends, and find a counselor to help you quit. Join a support group, such as Nicotine Anonymous. Go to www.smokefree.gov to sign up for text messaging support.     Talk to your doctor or pharmacist about medicines that can help you quit.  Medicines can help with cravings and withdrawal symptoms. There are several over-the-counter choices, such as nicotine patches, gum, and lozenges.     After you quit, do not use tobacco again, not even once.  Get rid of all tobacco products and anything that reminds you of tobacco, such as ashtrays.     Avoid things that make you reach for tobacco.  Change your daily routine. Take a different route to work, or eat a meal in a different place.     Try to cut down on stress.  Find ways to calm yourself, such as taking a hot bath or doing deep breathing exercises.     Eat a healthy diet, and get regular exercise.  Having healthy habits may help you quit using tobacco.     Don't  "give up on quitting if you use tobacco again.  Most people quit and restart a few times before they quit for good.   Follow-up care is a key part of your treatment and safety. Be sure to make and go to all appointments, and call your doctor if you are having problems. It's also a good idea to know your test results and keep a list of the medicines you take.  Where can you learn more?  Go to https://www.[employer]net/patiented  Enter Y522 in the search box to learn more about \"Quitting Tobacco: Care Instructions.\"  Current as of: November 15, 2023               Content Version: 14.0    3940-8746 [employer].   Care instructions adapted under license by your healthcare professional. If you have questions about a medical condition or this instruction, always ask your healthcare professional. [employer] disclaims any warranty or liability for your use of this information.      Lung Cancer Screening   Frequently Asked Questions  If you are at high-risk for lung cancer, getting screened with low-dose computed tomography (LDCT) every year can help save your life. This handout offers answers to some of the most common questions about lung cancer screening. If you have other questions, please call [phone] ([phone]).     What is it?  Lung cancer screening uses special X-ray technology to create an image of your lung tissue. The exam is quick and easy and takes less than 10 seconds. We don t give you any medicine or use any needles. You can eat before and after the exam. You don t need to change your clothes as long as the clothing on your chest doesn t contain metal. But, you do need to be able to hold your breath for at least 6 seconds during the exam.    What is the goal of lung cancer screening?  The goal of lung cancer screening is to save lives. Many times, lung cancer is not found until a person starts having physical symptoms. Lung cancer screening can help detect lung " cancer in the earliest stages when it may be easier to treat.    Who should be screened for lung cancer?  We suggest lung cancer screening for anyone who is at high-risk for lung cancer. You are in the high-risk group if you:     are between the ages of 55 and 79, and   have smoked at least 1 pack of cigarettes a day for 20 or more years, and   still smoke or have quit within the past 15 years.    However, if you have a new cough or shortness of breath, you should talk to your doctor before being screened.    Why does it matter if I have symptoms?  Certain symptoms can be a sign that you have a condition in your lungs that should be checked and treated by your doctor. These symptoms include fever, chest pain, a new or changing cough, shortness of breath that you have never felt before, coughing up blood or unexplained weight loss. Having any of these symptoms can greatly affect the results of lung cancer screening.       Should all smokers get an LDCT lung cancer screening exam?  It depends. Lung cancer screening is for a very specific group of men and women who have a history of heavy smoking over a long period of time (see  Who should be screened for lung cancer  above).  I am in the high-risk group, but have been diagnosed with cancer in the past. Is LDCT lung cancer screening right for me?  In some cases, you should not have LDCT lung screening, such as when your doctor is already following your cancer with CT scan studies. Your doctor will help you decide if LDCT lung screening is right for you.  Do I need to have a screening exam every year?  Yes. If you are in the high-risk group described earlier, you should get an LDCT lung cancer screening exam every year until you are 79, or are no longer willing or able to undergo screening and possible procedures to diagnose and treat lung cancer.  How effective is LDCT at preventing death from lung cancer?  Studies have shown that LDCT lung cancer screening can lower  the risk of death from lung cancer by 20 percent in people who are at high-risk.  What are the risks?  There are some risks and limitations of LDCT lung cancer screening. We want to make sure you understand the risks and benefits, so please let us know if you have any questions. Your doctor may want to talk with you more about these risks.   Radiation exposure: As with any exam that uses radiation, there is a very small increased risk of cancer. The amount of radiation in LDCT is small--about the same amount a person would get from a mammogram. Your doctor orders the exam when he or she feels the potential benefits outweigh the risks.   False negatives: No test is perfect, including LDCT. It is possible that you may have a medical condition, including lung cancer, that is not found during your exam. This is called a false negative result.   False positives and more testing: LDCT very often finds something in the lung that could be cancer, but in fact is not. This is called a false positive result. False positive tests often cause anxiety. To make sure these findings are not cancer, you may need to have more tests. These tests will be done only if you give us permission. Sometimes patients need a treatment that can have side effects, such as a biopsy. For more information on false positives, see  What can I expect from the results?    Findings not related to lung cancer: Your LDCT exam also takes pictures of areas of your body next to your lungs. In a very small number of cases, the CT scan will show an abnormal finding in one of these areas, such as your kidneys, adrenal glands, liver or thyroid. This finding may not be serious, but you may need more tests. Your doctor can help you decide what other tests you may need, if any.  What can I expect from the results?  About 1 out of 4 LDCT exams will find something that may need more tests. Most of the time, these findings are lung nodules. Lung nodules are very small  collections of tissue in the lung. These nodules are very common, and the vast majority--more than 97 percent--are not cancer (benign). Most are normal lymph nodes or small areas of scarring from past infections.  But, if a small lung nodule is found to be cancer, the cancer can be cured more than 90 percent of the time. To know if the nodule is cancer, we may need to get more images before your next yearly screening exam. If the nodule has suspicious features (for example, it is large, has an odd shape or grows over time), we will refer you to a specialist for further testing.  Will my doctor also get the results?  Yes. Your doctor will get a copy of your results.  Is it okay to keep smoking now that there s a cancer screening exam?  No. Tobacco is one of the strongest cancer-causing agents. It causes not only lung cancer, but other cancers and cardiovascular (heart) diseases as well. The damage caused by smoking builds over time. This means that the longer you smoke, the higher your risk of disease. While it is never too late to quit, the sooner you quit, the better.  Where can I find help to quit smoking?  The best way to prevent lung cancer is to stop smoking. If you have already quit smoking, congratulations and keep it up! For help on quitting smoking, please call [employer] at 8-154-QUITNOW (1-914.814.5191) or the American Cancer Society at 1-296.547.4887 to find local resources near you.  One-on-one health coaching:  If you d prefer to work individually with a health care provider on tobacco cessation, we offer:     Medication Therapy Management:  Our specially trained pharmacists work closely with you and your doctor to help you quit smoking.  Call [phone] or [phone] (toll free).

## 2024-05-15 ENCOUNTER — TELEPHONE (OUTPATIENT)
Dept: FAMILY MEDICINE | Facility: OTHER | Age: 74
End: 2024-05-15

## 2024-06-05 DIAGNOSIS — R73.03 PREDIABETES: ICD-10-CM

## 2024-06-05 NOTE — TELEPHONE ENCOUNTER
metFORMIN (GLUCOPHAGE XR) 500 MG 24 hr tablet      Last Written Prescription Date:  5-1-24  Last Fill Quantity: 60,   # refills: 0  Last Office Visit: 5-10-24  Future Office visit:       Routing refill request to provider for review/approval because:

## 2024-06-06 RX ORDER — METFORMIN HCL 500 MG
500 TABLET, EXTENDED RELEASE 24 HR ORAL 2 TIMES DAILY WITH MEALS
Qty: 60 TABLET | Refills: 3 | Status: SHIPPED | OUTPATIENT
Start: 2024-06-06

## 2024-06-17 PROBLEM — Z71.89 ADVANCED DIRECTIVES, COUNSELING/DISCUSSION: Status: RESOLVED | Noted: 2023-05-01 | Resolved: 2024-06-17

## 2024-07-14 ENCOUNTER — HEALTH MAINTENANCE LETTER (OUTPATIENT)
Age: 74
End: 2024-07-14

## 2024-11-30 ENCOUNTER — HEALTH MAINTENANCE LETTER (OUTPATIENT)
Age: 74
End: 2024-11-30

## 2025-04-15 ENCOUNTER — OFFICE VISIT (OUTPATIENT)
Dept: FAMILY MEDICINE | Facility: OTHER | Age: 75
End: 2025-04-15
Attending: NURSE PRACTITIONER
Payer: COMMERCIAL

## 2025-04-15 VITALS
DIASTOLIC BLOOD PRESSURE: 62 MMHG | SYSTOLIC BLOOD PRESSURE: 114 MMHG | BODY MASS INDEX: 32.13 KG/M2 | RESPIRATION RATE: 20 BRPM | WEIGHT: 170.2 LBS | HEIGHT: 61 IN | TEMPERATURE: 96.9 F | HEART RATE: 61 BPM | OXYGEN SATURATION: 95 %

## 2025-04-15 DIAGNOSIS — Z00.00 ROUTINE HISTORY AND PHYSICAL EXAMINATION OF ADULT: Primary | ICD-10-CM

## 2025-04-15 DIAGNOSIS — F17.200 NICOTINE DEPENDENCE, UNCOMPLICATED, UNSPECIFIED NICOTINE PRODUCT TYPE: ICD-10-CM

## 2025-04-15 DIAGNOSIS — I10 BENIGN ESSENTIAL HYPERTENSION: ICD-10-CM

## 2025-04-15 DIAGNOSIS — Z12.31 ENCOUNTER FOR SCREENING MAMMOGRAM FOR MALIGNANT NEOPLASM OF BREAST: ICD-10-CM

## 2025-04-15 DIAGNOSIS — Z87.891 PERSONAL HISTORY OF TOBACCO USE: ICD-10-CM

## 2025-04-15 DIAGNOSIS — R73.03 PREDIABETES: ICD-10-CM

## 2025-04-15 DIAGNOSIS — E78.5 HYPERLIPIDEMIA LDL GOAL <100: ICD-10-CM

## 2025-04-15 DIAGNOSIS — Z12.11 SCREEN FOR COLON CANCER: ICD-10-CM

## 2025-04-15 DIAGNOSIS — F32.0 CURRENT MILD EPISODE OF MAJOR DEPRESSIVE DISORDER WITHOUT PRIOR EPISODE: ICD-10-CM

## 2025-04-15 LAB
ALBUMIN SERPL BCG-MCNC: 4.3 G/DL (ref 3.5–5.2)
ALBUMIN UR-MCNC: NEGATIVE MG/DL
ALP SERPL-CCNC: 85 U/L (ref 40–150)
ALT SERPL W P-5'-P-CCNC: 25 U/L (ref 0–50)
ANION GAP SERPL CALCULATED.3IONS-SCNC: 9 MMOL/L (ref 7–15)
APPEARANCE UR: CLEAR
AST SERPL W P-5'-P-CCNC: 32 U/L (ref 0–45)
BASOPHILS # BLD AUTO: 0 10E3/UL (ref 0–0.2)
BASOPHILS NFR BLD AUTO: 0 %
BILIRUB SERPL-MCNC: 0.8 MG/DL
BILIRUB UR QL STRIP: NEGATIVE
BUN SERPL-MCNC: 11.7 MG/DL (ref 8–23)
CALCIUM SERPL-MCNC: 9.6 MG/DL (ref 8.8–10.4)
CHLORIDE SERPL-SCNC: 105 MMOL/L (ref 98–107)
CHOLEST SERPL-MCNC: 146 MG/DL
COLOR UR AUTO: YELLOW
CREAT SERPL-MCNC: 0.73 MG/DL (ref 0.51–0.95)
EGFRCR SERPLBLD CKD-EPI 2021: 86 ML/MIN/1.73M2
EOSINOPHIL # BLD AUTO: 0.3 10E3/UL (ref 0–0.7)
EOSINOPHIL NFR BLD AUTO: 3 %
ERYTHROCYTE [DISTWIDTH] IN BLOOD BY AUTOMATED COUNT: 13.3 % (ref 10–15)
EST. AVERAGE GLUCOSE BLD GHB EST-MCNC: 128 MG/DL
FASTING STATUS PATIENT QL REPORTED: NO
FASTING STATUS PATIENT QL REPORTED: NO
GLUCOSE SERPL-MCNC: 106 MG/DL (ref 70–99)
GLUCOSE UR STRIP-MCNC: NEGATIVE MG/DL
HBA1C MFR BLD: 6.1 %
HCO3 SERPL-SCNC: 24 MMOL/L (ref 22–29)
HCT VFR BLD AUTO: 42.4 % (ref 35–47)
HDLC SERPL-MCNC: 41 MG/DL
HGB BLD-MCNC: 14.3 G/DL (ref 11.7–15.7)
HGB UR QL STRIP: NEGATIVE
IMM GRANULOCYTES # BLD: 0.1 10E3/UL
IMM GRANULOCYTES NFR BLD: 1 %
KETONES UR STRIP-MCNC: NEGATIVE MG/DL
LDLC SERPL CALC-MCNC: 73 MG/DL
LEUKOCYTE ESTERASE UR QL STRIP: NEGATIVE
LYMPHOCYTES # BLD AUTO: 4.7 10E3/UL (ref 0.8–5.3)
LYMPHOCYTES NFR BLD AUTO: 44 %
MCH RBC QN AUTO: 29.4 PG (ref 26.5–33)
MCHC RBC AUTO-ENTMCNC: 33.7 G/DL (ref 31.5–36.5)
MCV RBC AUTO: 87 FL (ref 78–100)
MONOCYTES # BLD AUTO: 0.6 10E3/UL (ref 0–1.3)
MONOCYTES NFR BLD AUTO: 6 %
NEUTROPHILS # BLD AUTO: 5 10E3/UL (ref 1.6–8.3)
NEUTROPHILS NFR BLD AUTO: 47 %
NITRATE UR QL: NEGATIVE
NONHDLC SERPL-MCNC: 105 MG/DL
PH UR STRIP: 6 [PH] (ref 5–7)
PLATELET # BLD AUTO: 182 10E3/UL (ref 150–450)
POTASSIUM SERPL-SCNC: 4.1 MMOL/L (ref 3.4–5.3)
PROT SERPL-MCNC: 8.1 G/DL (ref 6.4–8.3)
RBC # BLD AUTO: 4.86 10E6/UL (ref 3.8–5.2)
SODIUM SERPL-SCNC: 138 MMOL/L (ref 135–145)
SP GR UR STRIP: <=1.005 (ref 1–1.03)
T4 FREE SERPL-MCNC: 1.07 NG/DL (ref 0.9–1.7)
TRIGL SERPL-MCNC: 160 MG/DL
TSH SERPL DL<=0.005 MIU/L-ACNC: 6.12 UIU/ML (ref 0.3–4.2)
UROBILINOGEN UR STRIP-ACNC: 0.2 E.U./DL
WBC # BLD AUTO: 10.7 10E3/UL (ref 4–11)

## 2025-04-15 PROCEDURE — 80061 LIPID PANEL: CPT | Mod: ZL | Performed by: NURSE PRACTITIONER

## 2025-04-15 PROCEDURE — 84155 ASSAY OF PROTEIN SERUM: CPT | Mod: ZL | Performed by: NURSE PRACTITIONER

## 2025-04-15 PROCEDURE — 84443 ASSAY THYROID STIM HORMONE: CPT | Mod: ZL | Performed by: NURSE PRACTITIONER

## 2025-04-15 PROCEDURE — 36415 COLL VENOUS BLD VENIPUNCTURE: CPT | Mod: ZL | Performed by: NURSE PRACTITIONER

## 2025-04-15 PROCEDURE — G0463 HOSPITAL OUTPT CLINIC VISIT: HCPCS | Mod: 25

## 2025-04-15 PROCEDURE — 83036 HEMOGLOBIN GLYCOSYLATED A1C: CPT | Mod: ZL | Performed by: NURSE PRACTITIONER

## 2025-04-15 PROCEDURE — 84439 ASSAY OF FREE THYROXINE: CPT | Mod: ZL | Performed by: NURSE PRACTITIONER

## 2025-04-15 PROCEDURE — 85025 COMPLETE CBC W/AUTO DIFF WBC: CPT | Mod: ZL | Performed by: NURSE PRACTITIONER

## 2025-04-15 PROCEDURE — G0296 VISIT TO DETERM LDCT ELIG: HCPCS | Performed by: NURSE PRACTITIONER

## 2025-04-15 PROCEDURE — 84460 ALANINE AMINO (ALT) (SGPT): CPT | Mod: ZL | Performed by: NURSE PRACTITIONER

## 2025-04-15 PROCEDURE — 81003 URINALYSIS AUTO W/O SCOPE: CPT | Mod: ZL | Performed by: NURSE PRACTITIONER

## 2025-04-15 RX ORDER — LOSARTAN POTASSIUM 100 MG/1
TABLET ORAL
Qty: 90 TABLET | Refills: 2 | Status: SHIPPED | OUTPATIENT
Start: 2025-04-15

## 2025-04-15 RX ORDER — METFORMIN HYDROCHLORIDE 500 MG/1
500 TABLET, EXTENDED RELEASE ORAL 2 TIMES DAILY WITH MEALS
Qty: 60 TABLET | Refills: 3 | Status: SHIPPED | OUTPATIENT
Start: 2025-04-15

## 2025-04-15 RX ORDER — AMLODIPINE BESYLATE 10 MG/1
10 TABLET ORAL DAILY
Qty: 90 TABLET | Refills: 2 | Status: SHIPPED | OUTPATIENT
Start: 2025-04-15

## 2025-04-15 RX ORDER — CITALOPRAM HYDROBROMIDE 20 MG/1
TABLET ORAL
Qty: 45 TABLET | Refills: 3 | Status: SHIPPED | OUTPATIENT
Start: 2025-04-15

## 2025-04-15 RX ORDER — SIMVASTATIN 40 MG
40 TABLET ORAL AT BEDTIME
Qty: 90 TABLET | Refills: 2 | Status: SHIPPED | OUTPATIENT
Start: 2025-04-15

## 2025-04-15 RX ORDER — METOPROLOL TARTRATE 50 MG
TABLET ORAL
Qty: 180 TABLET | Refills: 2 | Status: SHIPPED | OUTPATIENT
Start: 2025-04-15

## 2025-04-15 SDOH — HEALTH STABILITY: PHYSICAL HEALTH: ON AVERAGE, HOW MANY DAYS PER WEEK DO YOU ENGAGE IN MODERATE TO STRENUOUS EXERCISE (LIKE A BRISK WALK)?: 0 DAYS

## 2025-04-15 ASSESSMENT — ANXIETY QUESTIONNAIRES
IF YOU CHECKED OFF ANY PROBLEMS ON THIS QUESTIONNAIRE, HOW DIFFICULT HAVE THESE PROBLEMS MADE IT FOR YOU TO DO YOUR WORK, TAKE CARE OF THINGS AT HOME, OR GET ALONG WITH OTHER PEOPLE: NOT DIFFICULT AT ALL
GAD7 TOTAL SCORE: 1
IF YOU CHECKED OFF ANY PROBLEMS ON THIS QUESTIONNAIRE, HOW DIFFICULT HAVE THESE PROBLEMS MADE IT FOR YOU TO DO YOUR WORK, TAKE CARE OF THINGS AT HOME, OR GET ALONG WITH OTHER PEOPLE: SOMEWHAT DIFFICULT
6. BECOMING EASILY ANNOYED OR IRRITABLE: NOT AT ALL
6. BECOMING EASILY ANNOYED OR IRRITABLE: NOT AT ALL
4. TROUBLE RELAXING: SEVERAL DAYS
1. FEELING NERVOUS, ANXIOUS, OR ON EDGE: SEVERAL DAYS
7. FEELING AFRAID AS IF SOMETHING AWFUL MIGHT HAPPEN: NOT AT ALL
2. NOT BEING ABLE TO STOP OR CONTROL WORRYING: NOT AT ALL
5. BEING SO RESTLESS THAT IT IS HARD TO SIT STILL: NOT AT ALL
GAD7 TOTAL SCORE: INCOMPLETE
5. BEING SO RESTLESS THAT IT IS HARD TO SIT STILL: NOT AT ALL
GAD7 TOTAL SCORE: 1
7. FEELING AFRAID AS IF SOMETHING AWFUL MIGHT HAPPEN: NOT AT ALL
8. IF YOU CHECKED OFF ANY PROBLEMS, HOW DIFFICULT HAVE THESE MADE IT FOR YOU TO DO YOUR WORK, TAKE CARE OF THINGS AT HOME, OR GET ALONG WITH OTHER PEOPLE?: SOMEWHAT DIFFICULT
7. FEELING AFRAID AS IF SOMETHING AWFUL MIGHT HAPPEN: NOT AT ALL
3. WORRYING TOO MUCH ABOUT DIFFERENT THINGS: NOT AT ALL

## 2025-04-15 ASSESSMENT — PATIENT HEALTH QUESTIONNAIRE - PHQ9
SUM OF ALL RESPONSES TO PHQ QUESTIONS 1-9: 15
10. IF YOU CHECKED OFF ANY PROBLEMS, HOW DIFFICULT HAVE THESE PROBLEMS MADE IT FOR YOU TO DO YOUR WORK, TAKE CARE OF THINGS AT HOME, OR GET ALONG WITH OTHER PEOPLE: VERY DIFFICULT
SUM OF ALL RESPONSES TO PHQ QUESTIONS 1-9: 15
5. POOR APPETITE OR OVEREATING: NOT AT ALL

## 2025-04-15 ASSESSMENT — SOCIAL DETERMINANTS OF HEALTH (SDOH): HOW OFTEN DO YOU GET TOGETHER WITH FRIENDS OR RELATIVES?: ONCE A WEEK

## 2025-04-15 ASSESSMENT — PAIN SCALES - GENERAL: PAINLEVEL_OUTOF10: NO PAIN (0)

## 2025-04-15 NOTE — PROGRESS NOTES
Preventive Care Visit  RANGE BUNNY Valdez [facility], Family Medicine          Apr 15, 2025          Assessment & Plan       Annual Physical  - CBC with platelets and differential  - UA Macroscopic with reflex to Microscopic and Culture      Hyperlipidemia LDL goal <100 - stable  - Low saturated fat diet  - Comprehensive metabolic panel  - Lipid Profile (Chol, Trig, HDL, LDL calc)  - simvastatin (ZOCOR) 40 MG tablet; Take 1 tablet (40 mg) by mouth at bedtime.      Benign essential hypertension - stable  - Low saturated fat diet  - Comprehensive metabolic panel  - TSH with free T4 reflex  - metoprolol tartrate (LOPRESSOR) 50 MG tablet; TAKE 1 TABLET BY MOUTH TWICE A DAY FOR HEART AND BLOOD PRESSURE.  - losartan (COZAAR) 100 MG tablet; TAKE 1 TABLET (100 MG) BY MOUTH DAILY FOR BLOOD PRESSURE  - amLODIPine (NORVASC) 10 MG tablet; Take 1 tablet (10 mg) by mouth daily.      Prediabetes  - Comprehensive metabolic panel  - Hemoglobin A1c  - metFORMIN (GLUCOPHAGE XR) 500 MG 24 hr tablet; Take 1 tablet (500 mg) by mouth 2 times daily (with meals).      Current mild episode of major depressive disorder without prior episode  - citalopram (CELEXA) 20 MG tablet; TAKE 1.5 TABLETS (30MG) BY MOUTH ONCE DAILY.      Nicotine dependence, uncomplicated, unspecified nicotine product type  - Quit support printed      Personal history of tobacco use  - Prof fee: Shared Decision Making for Lung Cancer Screening  - CT Chest Lung Cancer Scrn Low Dose wo; Future      Encounter for screening mammogram for malignant neoplasm of breast  - MA Screen Bilateral w/Abhinav; Future      Screen for colon cancer  - Colonoscopy Screening  Referral; Future          Patient has been advised of split billing requirements and indicates understanding: Yes        Nicotine/Tobacco Cessation  She reports that she has been smoking cigarettes. She started smoking about 53 years ago. She has a 53.3 pack-year smoking history. She has never used smokeless  tobacco.  Nicotine/Tobacco Cessation Plan  Self help information given to patient           Return in about 6 months (around 10/15/2025).        All chronic conditions are stable at this time  Please continue to take all medication as directed  Please notify your pharmacy or our refill line of future refills needed.  Please return sooner than your next scheduled appointment with any concerns.        When your lab results return, we will call you with abnormal findings  You can also view this information in your MyChart, if you have an account.  Please call or Ascend[employer] message us with any questions you may have.          Ana Valdez [facility]  [phone]             Mario is a 74 year old, presenting for the following:  Physical        Hyperlipidemia Follow-Up  Are you regularly taking any medication or supplement to lower your cholesterol?   Yes- simvastatin 40 mg daily  Are you having muscle aches or other side effects that you think could be caused by your cholesterol lowering medication?  No          Hypertension Follow-up  Do you check your blood pressure regularly outside of the clinic? No   Are you following a low salt diet? No  Are your blood pressures ever more than 140 on the top number (systolic) OR more   than 90 on the bottom number (diastolic), for example 140/90? No          BP Readings from Last 2 Encounters:   04/15/25 114/62   05/10/24 122/68           Depression   How are you doing with your depression since your last visit? Worsened   Are you having other symptoms that might be associated with depression? No  Have you had a significant life event?  Grief or Loss   Are you feeling anxious or having panic attacks?   No  Do you have any concerns with your use of alcohol or other drugs? No        Social History     Tobacco Use    Smoking status: Heavy Smoker     Current packs/day: 1.00     Average packs/day: 1 pack/day for 53.3 years (53.3 ttl pk-yrs)     Types: Cigarettes     Start date: 1/1/1972     Smokeless tobacco: Never    Tobacco comments:     pt is slowly cutting down- 1 pack a day    Vaping Use    Vaping status: Never Used   Substance Use Topics    Alcohol use: No    Drug use: No               5/1/2023     2:18 PM 5/10/2024     2:57 PM 4/15/2025     2:38 PM   PHQ   PHQ-9 Total Score 19    19 8 15    Q9: Thoughts of better off dead/self-harm past 2 weeks Not at all  Not at all Not at all       Patient-reported    Proxy-reported               5/1/2023     2:20 PM 5/10/2024     3:15 PM 4/15/2025     2:45 PM   ORTEGA-7 SCORE   Total Score 9 (mild anxiety)  Incomplete   Total Score 9    9 0              4/15/2025     2:38 PM   Last PHQ-9   1.  Little interest or pleasure in doing things 2   2.  Feeling down, depressed, or hopeless 2   3.  Trouble falling or staying asleep, or sleeping too much 2   4.  Feeling tired or having little energy 2   5.  Poor appetite or overeating 3   6.  Feeling bad about yourself 2   7.  Trouble concentrating 2   8.  Moving slowly or restless 0   Q9: Thoughts of better off dead/self-harm past 2 weeks 0   PHQ-9 Total Score 15        Patient-reported               4/15/2025     2:45 PM   ORTEGA-7    4. Trouble relaxing 1   5. Being so restless that it is hard to sit still 0   6. Becoming easily annoyed or irritable 0   7. Feeling afraid, as if something awful might happen 0   If you checked any problems, how difficult have they made it for you to do your work, take care of things at home, or get along with other people? Somewhat difficult           Pre-diabetes  - A1C is due        Advance Care Planning  Patient does not have a Health Care Directive: Discussed advance care planning with patient; however, patient declined at this time.              4/15/2025   General Health   How would you rate your overall physical health? (!) FAIR   Feel stress (tense, anxious, or unable to sleep) To some extent   (!) STRESS CONCERN              4/15/2025   Nutrition   Diet: Regular (no restrictions)              4/15/2025   Exercise   Days per week of moderate/strenous exercise 0 days   (!) EXERCISE CONCERN            4/15/2025   Social Factors   Frequency of gathering with friends or relatives Once a week   Worry food won't last until get money to buy more No   Food not last or not have enough money for food? No   Do you have housing? (Housing is defined as stable permanent housing and does not include staying ouside in a car, in a tent, in an abandoned building, in an overnight shelter, or couch-surfing.) Yes   Are you worried about losing your housing? No   Lack of transportation? No   Unable to get utilities (heat,electricity)? No             4/15/2025   Fall Risk   Fallen 2 or more times in the past year? No   Trouble with walking or balance? No              4/15/2025   Activities of Daily Living- Home Safety   Needs help with the following daily activites None of the above   Safety concerns in the home None of the above             4/15/2025   Dental   Dentist two times every year? (!) NO             4/15/2025   Hearing Screening   Hearing concerns? None of the above             4/15/2025   Driving Risk Screening   Patient/family members have concerns about driving No             4/15/2025   General Alertness/Fatigue Screening   Have you been more tired than usual lately? (!) YES             4/15/2025   Urinary Incontinence Screening   Bothered by leaking urine in past 6 months No           Today's PHQ-9 Score:       4/15/2025     2:38 PM   PHQ-9 SCORE   PHQ-9 Total Score MyChart 15 (Moderately severe depression)   PHQ-9 Total Score 15        Patient-reported             4/15/2025   Substance Use   Alcohol more than 3/day or more than 7/wk No   Do you have a current opioid prescription? No   How severe/bad is pain from 1 to 10? 0/10 (No Pain)   Do you use any other substances recreationally? No           Social History     Tobacco Use    Smoking status: Heavy Smoker     Current packs/day: 1.00     Average  packs/day: 1 pack/day for 53.3 years (53.3 ttl pk-yrs)     Types: Cigarettes     Start date: 1/1/1972    Smokeless tobacco: Never    Tobacco comments:     pt is slowly cutting down- 1 pack a day    Vaping Use    Vaping status: Never Used   Substance Use Topics    Alcohol use: No    Drug use: No          Mammogram is overdue - ordered last May, not completed        ASCVD Risk   The ASCVD Risk score (Ranulfo LUNA, et al., 2019) failed to calculate for the following reasons:    The valid total cholesterol range is 130 to 320 mg/dL         Past Medical History:   Diagnosis Date    Benign essential hypertension 4/13/2018    Calculus of kidney 1/3/2020    Current mild episode of major depressive disorder without prior episode 3/1/2021    Hyperlipidemia LDL goal <100 4/13/2018    Taking a statin medication 4/13/2018    Tobacco abuse 4/13/2018    Type 2 diabetes mellitus without complication, without long-term current use of insulin (H) 1/14/2020         Past Surgical History:   Procedure Laterality Date    ABDOMEN SURGERY      2 c-sections    CHOLECYSTECTOMY  1994    GYN SURGERY      total hyst,, no cervix    HEAD & NECK SURGERY      tumor neck, benign    ORTHOPEDIC SURGERY Bilateral     carpul tunnel     ZZHC REMOVAL OF NAIL PLATE SIMPLE SINGLE  04/19/2018    removal of 1/4  left gt toenail, local            OB History   No obstetric history on file.         Lab work is in process  Labs reviewed in EPIC  BP Readings from Last 3 Encounters:   04/15/25 114/62   05/10/24 122/68   05/01/23 114/68    Wt Readings from Last 3 Encounters:   04/15/25 77.2 kg (170 lb 3.2 oz)   05/10/24 80.1 kg (176 lb 9.6 oz)   05/01/23 74 kg (163 lb 1.6 oz)                  Patient Active Problem List   Diagnosis    Benign essential hypertension    Tobacco abuse    Hyperlipidemia LDL goal <100    Taking a statin medication    Morbid obesity (H)    Calculus of kidney    Current mild episode of major depressive disorder without prior episode     RIGHT Thyroid nodule    History of partial left thyroid lobectomy    Prediabetes     Past Surgical History:   Procedure Laterality Date    ABDOMEN SURGERY      2 c-sections    CHOLECYSTECTOMY  1994    GYN SURGERY      total hyst,, no cervix    HEAD & NECK SURGERY      tumor neck, benign    ORTHOPEDIC SURGERY Bilateral     carpul tunnel     ZZHC REMOVAL OF NAIL PLATE SIMPLE SINGLE  04/19/2018    removal of 1/4  left gt toenail, local        Social History     Tobacco Use    Smoking status: Heavy Smoker     Current packs/day: 1.00     Average packs/day: 1 pack/day for 53.3 years (53.3 ttl pk-yrs)     Types: Cigarettes     Start date: 1/1/1972    Smokeless tobacco: Never    Tobacco comments:     pt is slowly cutting down- 1 pack a day    Substance Use Topics    Alcohol use: No     Family History   Problem Relation Age of Onset    Other Cancer Mother     Other Cancer Brother     Breast Cancer Paternal Aunt                Current Outpatient Medications   Medication Sig Dispense Refill    amLODIPine (NORVASC) 10 MG tablet Take 1 tablet (10 mg) by mouth daily 90 tablet 2    aspirin 81 MG EC tablet Take 1 tablet (81 mg) by mouth daily 90 tablet 3    capsaicin (ZOSTRIX) 0.025 % external cream Apply 1 g topically 3 times daily 120 g 3    cholecalciferol (VITAMIN D3) 5000 units TABS tablet Take 1 tablet (5,000 Units) by mouth daily 90 tablet 11    citalopram (CELEXA) 20 MG tablet TAKE 1.5 TABLETS (30MG) BY MOUTH ONCE DAILY. 45 tablet 3    losartan (COZAAR) 100 MG tablet TAKE 1 TABLET (100 MG) BY MOUTH DAILY FOR BLOOD PRESSURE 90 tablet 2    metFORMIN (GLUCOPHAGE XR) 500 MG 24 hr tablet TAKE 1 TABLET (500 MG) BY MOUTH 2 TIMES DAILY (WITH MEALS) 60 tablet 3    metoprolol tartrate (LOPRESSOR) 50 MG tablet TAKE 1 TABLET BY MOUTH TWICE A DAY FOR HEART AND BLOOD PRESSURE. 180 tablet 2    Multiple Vitamins-Iron (DAILY MULTIPLE VITAMIN/IRON) TABS Take 1 tablet by mouth daily 90 tablet 11    nystatin (MYCOSTATIN) 767486 UNIT/GM  external cream Apply topically 2 times daily 60 g 1    omega 3 1000 MG CAPS 3 po daily 90 capsule 11    simvastatin (ZOCOR) 40 MG tablet Take 1 tablet (40 mg) by mouth at bedtime 90 tablet 2           No Known Allergies        Recent Labs   Lab Test 05/10/24  1506 05/01/23  1415 09/07/22  1355 09/03/21  1408 03/01/21  1322 08/28/20  1330   A1C 5.9* 5.6 5.7*   < > 5.6 5.9*   LDL 60 61 63   < > 82 85   HDL 38* 47* 50   < > 49* 42*   TRIG 156* 232* 106   < > 130 150*   ALT 21 15 29   < > 27 25   CR 0.82 0.80 0.66   < > 0.78 0.72   GFRESTIMATED 76 78 >90   < > 78 86   GFRESTBLACK  --   --   --   --  >90 >90   POTASSIUM 4.1 3.7 4.2   < > 4.1 4.1   TSH 6.96* 6.82* 3.01   < > 4.00 2.86    < > = values in this interval not displayed.           Current providers sharing in care for this patient include:  Patient Care Team:  Ana Valdez CNP as PCP - General (Family Practice)  Ana Valdez CNP as Assigned PCP          The following health maintenance items are reviewed in Epic and correct as of today:  Health Maintenance   Topic Date Due    ZOSTER IMMUNIZATION (1 of 2) Never done    MAMMO SCREENING  03/19/2022    LUNG CANCER SCREENING  03/19/2022    COLORECTAL CANCER SCREENING  06/13/2023    COVID-19 Vaccine (1 - 2024-25 season) Never done    A1C  11/10/2024    ORTEGA ASSESSMENT  11/10/2024    NICOTINE/TOBACCO CESSATION COUNSELING Q 1 YR  05/10/2025    BMP  05/10/2025    LIPID  05/10/2025    RSV VACCINE (1 - Risk 60-74 years 1-dose series) 05/10/2025 (Originally 12/23/2011)    INFLUENZA VACCINE (1) 06/30/2025 (Originally 9/1/2024)    PHQ-9  10/15/2025    MEDICARE ANNUAL WELLNESS VISIT  04/15/2026    FALL RISK ASSESSMENT  04/15/2026    DTAP/TDAP/TD IMMUNIZATION (2 - Td or Tdap) 04/13/2028    ADVANCE CARE PLANNING  05/01/2028    DEPRESSION ACTION PLAN  Completed    Pneumococcal Vaccine: 50+ Years  Completed    HPV IMMUNIZATION  Aged Out    MENINGITIS IMMUNIZATION  Aged Out    DEXA  Discontinued    MICROALBUMIN  Discontinued     "DIABETIC FOOT EXAM  Discontinued    HEPATITIS C SCREENING  Discontinued    EYE EXAM  Discontinued           Review of Systems  Constitutional, HEENT, cardiovascular, pulmonary, GI, , musculoskeletal, neuro, skin, endocrine and psych systems are negative, except as otherwise noted.         Objective    Exam  /62 (BP Location: Left arm, Patient Position: Sitting, Cuff Size: Adult Regular)   Pulse 61   Temp 96.9  F (36.1  C) (Tympanic)   Resp 20   Ht 1.537 m (5' 0.5\")   Wt 77.2 kg (170 lb 3.2 oz)   SpO2 95%   BMI 32.69 kg/m     Estimated body mass index is 32.69 kg/m  as calculated from the following:    Height as of this encounter: 1.537 m (5' 0.5\").    Weight as of this encounter: 77.2 kg (170 lb 3.2 oz).          Physical Exam  GENERAL: alert and no distress  EYES: Eyes grossly normal to inspection, PERRL and conjunctivae and sclerae normal  HENT: ear canals and TM's normal, nose and mouth without ulcers or lesions  NECK: no adenopathy, no asymmetry, masses, or scars  RESP: lungs clear to auscultation - no rales, rhonchi or wheezes  CV: regular rate and rhythm, normal S1 S2, no S3 or S4, no murmur, click or rub, no peripheral edema  ABDOMEN: soft, nontender, no hepatosplenomegaly, no masses and bowel sounds normal  MS: no gross musculoskeletal defects noted, no edema  SKIN: no suspicious lesions or rashes  PSYCH: mentation appears normal, affect normal/bright              4/15/2025   Mini Cog   Clock Draw Score 2 Normal   3 Item Recall 2 objects recalled   Mini Cog Total Score 4            Lung Cancer Screening Shared Decision Making Visit     Aga Gary, a 74 year old female, is eligible for lung cancer screening    History   Smoking Status    Heavy Smoker    Types: Cigarettes   Smokeless Tobacco    Never         I have discussed with patient the risks and benefits of screening for lung cancer with low-dose CT.     The risks include:    radiation exposure: one low dose chest CT has as much " ionizing radiation as about 15 chest x-rays, or 6 months of background radiation living in [place]      false positives: most findings/nodules are NOT cancer, but some might still require additional diagnostic evaluation, including biopsy    over-diagnosis: some slow growing cancers that might never have been clinically significant will be detected and treated unnecessarily     The benefit of early detection of lung cancer is contingent upon adherence to annual screening or more frequent follow up if indicated.     Furthermore, to benefit from screening, Aga must be willing and able to undergo diagnostic procedures, if indicated. Although no specific guide is available for determining severity of comorbidities, it is reasonable to withhold screening in patients who have greater mortality risk from other diseases.     We did discuss that the best way to prevent lung cancer is to not smoke.    Some patients may value a numeric estimation of lung cancer risk when evaluating if lung cancer screening is right for them, here is one calculator:    ShouldIScreen          The longitudinal plan of care for the diagnosis(es)/condition(s) as documented were addressed during this visit. Due to the added complexity in care, I will continue to support Mario in the subsequent management and with ongoing continuity of care.         Signed Electronically by: Ana Valdez, RENNY

## 2025-04-15 NOTE — PATIENT INSTRUCTIONS
Assessment & Plan       Annual Physical  - CBC with platelets and differential  - UA Macroscopic with reflex to Microscopic and Culture      Hyperlipidemia LDL goal <100 - stable  - Low saturated fat diet  - Comprehensive metabolic panel  - Lipid Profile (Chol, Trig, HDL, LDL calc)  - simvastatin (ZOCOR) 40 MG tablet; Take 1 tablet (40 mg) by mouth at bedtime.      Benign essential hypertension - stable  - Low saturated fat diet  - Comprehensive metabolic panel  - TSH with free T4 reflex  - metoprolol tartrate (LOPRESSOR) 50 MG tablet; TAKE 1 TABLET BY MOUTH TWICE A DAY FOR HEART AND BLOOD PRESSURE.  - losartan (COZAAR) 100 MG tablet; TAKE 1 TABLET (100 MG) BY MOUTH DAILY FOR BLOOD PRESSURE  - amLODIPine (NORVASC) 10 MG tablet; Take 1 tablet (10 mg) by mouth daily.      Prediabetes  - Comprehensive metabolic panel  - Hemoglobin A1c  - metFORMIN (GLUCOPHAGE XR) 500 MG 24 hr tablet; Take 1 tablet (500 mg) by mouth 2 times daily (with meals).      Current mild episode of major depressive disorder without prior episode  - citalopram (CELEXA) 20 MG tablet; TAKE 1.5 TABLETS (30MG) BY MOUTH ONCE DAILY.      Nicotine dependence, uncomplicated, unspecified nicotine product type  - Quit support printed      Personal history of tobacco use  - Prof fee: Shared Decision Making for Lung Cancer Screening  - CT Chest Lung Cancer Scrn Low Dose wo; Future      Encounter for screening mammogram for malignant neoplasm of breast  - MA Screen Bilateral w/Abhinav; Future      Screen for colon cancer  - Colonoscopy Screening  Referral; Future            Nicotine/Tobacco Cessation  She reports that she has been smoking cigarettes. She started smoking about 53 years ago. She has a 53.3 pack-year smoking history. She has never used smokeless tobacco.  Nicotine/Tobacco Cessation Plan  Self help information given to patient           Return in about 6 months (around 10/15/2025).        All chronic conditions are stable at this  time  Please continue to take all medication as directed  Please notify your pharmacy or our refill line of future refills needed.  Please return sooner than your next scheduled appointment with any concerns.        When your lab results return, we will call you with abnormal findings  You can also view this information in your MyChart, if you have an account.  Please call or Rainforestt message us with any questions you may have.          Ana Valdez [facility]  [phone]   Preventive Care Advice   This is general advice given by our system to help you stay healthy. However, your care team may have specific advice just for you. Please talk to your care team about your preventive care needs.  Nutrition  Eat 5 or more servings of fruits and vegetables each day.  Try wheat bread, brown rice and whole grain pasta (instead of white bread, rice, and pasta).  Get enough calcium and vitamin D. Check the label on foods and aim for 100% of the RDA (recommended daily allowance).  Lifestyle  Exercise at least 150 minutes each week  (30 minutes a day, 5 days a week).  Do muscle strengthening activities 2 days a week. These help control your weight and prevent disease.  No smoking.  Wear sunscreen to prevent skin cancer.  Have a dental exam and cleaning every 6 months.  Yearly exams  See your health care team every year to talk about:  Any changes in your health.  Any medicines your care team has prescribed.  Preventive care, family planning, and ways to prevent chronic diseases.  Shots (vaccines)   HPV shots (up to age 26), if you've never had them before.  Hepatitis B shots (up to age 59), if you've never had them before.  COVID-19 shot: Get this shot when it's due.  Flu shot: Get a flu shot every year.  Tetanus shot: Get a tetanus shot every 10 years.  Pneumococcal, hepatitis A, and RSV shots: Ask your care team if you need these based on your risk.  Shingles shot (for age 50 and up)  General health tests  Diabetes  screening:  Starting at age 35, Get screened for diabetes at least every 3 years.  If you are younger than age 35, ask your care team if you should be screened for diabetes.  Cholesterol test: At age 39, start having a cholesterol test every 5 years, or more often if advised.  Bone density scan (DEXA): At age 50, ask your care team if you should have this scan for osteoporosis (brittle bones).  Hepatitis C: Get tested at least once in your life.  STIs (sexually transmitted infections)  Before age 24: Ask your care team if you should be screened for STIs.  After age 24: Get screened for STIs if you're at risk. You are at risk for STIs (including HIV) if:  You are sexually active with more than one person.  You don't use condoms every time.  You or a partner was diagnosed with a sexually transmitted infection.  If you are at risk for HIV, ask about PrEP medicine to prevent HIV.  Get tested for HIV at least once in your life, whether you are at risk for HIV or not.  Cancer screening tests  Cervical cancer screening: If you have a cervix, begin getting regular cervical cancer screening tests starting at age 21.  Breast cancer scan (mammogram): If you've ever had breasts, begin having regular mammograms starting at age 40. This is a scan to check for breast cancer.  Colon cancer screening: It is important to start screening for colon cancer at age 45.  Have a colonoscopy test every 10 years (or more often if you're at risk) Or, ask your provider about stool tests like a FIT test every year or Cologuard test every 3 years.  To learn more about your testing options, visit:   .  For help making a decision, visit:   https://bit.ly/oo11346.  Prostate cancer screening test: If you have a prostate, ask your care team if a prostate cancer screening test (PSA) at age 55 is right for you.  Lung cancer screening: If you are a current or former smoker ages 50 to 80, ask your care team if ongoing lung cancer screenings are right for  you.  For informational purposes only. Not to replace the advice of your health care provider. Copyright   2023 [facility]. All rights reserved. Clinically reviewed by the [facility] Transitions Program. [employer] 558698 - REV 01/24.     Quitting Tobacco: Care Instructions  Quitting tobacco is much harder than simply changing a habit. Nicotine cravings make it hard to quit, but you can do it. Your doctor will help you set up the plan that best meets your needs.    You will miss the nicotine at first. You may feel short-tempered and grumpy. You may have trouble sleeping or thinking clearly. The urge to use tobacco may continue for a time.   Combining tools can raise your chances of success. You can use medicine along with counseling. And you can join a quit-tobacco program, such as the American Lung Association's Freedom from Smoking program.     Get support.  Reach out to family and friends, and find a counselor to help you quit. Join a support group, such as Nicotine Anonymous. Go to www.smokefree.gov to sign up for text messaging support.     Talk to your doctor or pharmacist about medicines that can help you quit.  Medicines can help with cravings and withdrawal symptoms. There are several over-the-counter choices, such as nicotine patches, gum, and lozenges.     After you quit, do not use tobacco again, not even once.  Get rid of all tobacco products and anything that reminds you of tobacco, such as ashtrays.     Avoid things that make you reach for tobacco.  Change your daily routine. Take a different route to work, or eat a meal in a different place.     Try to cut down on stress.  Find ways to calm yourself, such as taking a hot bath or doing deep breathing exercises.     Eat a healthy diet, and get regular exercise.  Having healthy habits may help you quit using tobacco.     Don't give up on quitting if you use tobacco again.  Most people quit and restart a few times before they quit  "for good.   Follow-up care is a key part of your treatment and safety. Be sure to make and go to all appointments, and call your doctor if you are having problems. It's also a good idea to know your test results and keep a list of the medicines you take.  Where can you learn more?  Go to https://www.Roundrate.net/patiented  Enter Y522 in the search box to learn more about \"Quitting Tobacco: Care Instructions.\"  Current as of: August 20, 2024  Content Version: 14.4    6006-8741 [employer].   Care instructions adapted under license by your healthcare professional. If you have questions about a medical condition or this instruction, always ask your healthcare professional. [employer] disclaims any warranty or liability for your use of this information.    Lung Cancer Screening   Frequently Asked Questions  If you are at high-risk for lung cancer, getting screened with low-dose computed tomography (LDCT) every year can help save your life. This handout offers answers to some of the most common questions about lung cancer screening. If you have other questions, please call [phone] ([phone]).     What is it?  Lung cancer screening uses special X-ray technology to create an image of your lung tissue. The exam is quick and easy and takes less than 10 seconds. We don t give you any medicine or use any needles. You can eat before and after the exam. You don t need to change your clothes as long as the clothing on your chest doesn t contain metal. But, you do need to be able to hold your breath for at least 6 seconds during the exam.    What is the goal of lung cancer screening?  The goal of lung cancer screening is to save lives. Many times, lung cancer is not found until a person starts having physical symptoms. Lung cancer screening can help detect lung cancer in the earliest stages when it may be easier to treat.    Who should be screened for lung cancer?  We suggest lung cancer " screening for anyone who is at high-risk for lung cancer. You are in the high-risk group if you:     are between the ages of 55 and 79, and   have smoked at least 1 pack of cigarettes a day for 20 or more years, and   still smoke or have quit within the past 15 years.    However, if you have a new cough or shortness of breath, you should talk to your doctor before being screened.    Why does it matter if I have symptoms?  Certain symptoms can be a sign that you have a condition in your lungs that should be checked and treated by your doctor. These symptoms include fever, chest pain, a new or changing cough, shortness of breath that you have never felt before, coughing up blood or unexplained weight loss. Having any of these symptoms can greatly affect the results of lung cancer screening.       Should all smokers get an LDCT lung cancer screening exam?  It depends. Lung cancer screening is for a very specific group of men and women who have a history of heavy smoking over a long period of time (see  Who should be screened for lung cancer  above).  I am in the high-risk group, but have been diagnosed with cancer in the past. Is LDCT lung cancer screening right for me?  In some cases, you should not have LDCT lung screening, such as when your doctor is already following your cancer with CT scan studies. Your doctor will help you decide if LDCT lung screening is right for you.  Do I need to have a screening exam every year?  Yes. If you are in the high-risk group described earlier, you should get an LDCT lung cancer screening exam every year until you are 79, or are no longer willing or able to undergo screening and possible procedures to diagnose and treat lung cancer.  How effective is LDCT at preventing death from lung cancer?  Studies have shown that LDCT lung cancer screening can lower the risk of death from lung cancer by 20 percent in people who are at high-risk.  What are the risks?  There are some risks and  limitations of LDCT lung cancer screening. We want to make sure you understand the risks and benefits, so please let us know if you have any questions. Your doctor may want to talk with you more about these risks.   Radiation exposure: As with any exam that uses radiation, there is a very small increased risk of cancer. The amount of radiation in LDCT is small--about the same amount a person would get from a mammogram. Your doctor orders the exam when he or she feels the potential benefits outweigh the risks.   False negatives: No test is perfect, including LDCT. It is possible that you may have a medical condition, including lung cancer, that is not found during your exam. This is called a false negative result.   False positives and more testing: LDCT very often finds something in the lung that could be cancer, but in fact is not. This is called a false positive result. False positive tests often cause anxiety. To make sure these findings are not cancer, you may need to have more tests. These tests will be done only if you give us permission. Sometimes patients need a treatment that can have side effects, such as a biopsy. For more information on false positives, see  What can I expect from the results?    Findings not related to lung cancer: Your LDCT exam also takes pictures of areas of your body next to your lungs. In a very small number of cases, the CT scan will show an abnormal finding in one of these areas, such as your kidneys, adrenal glands, liver or thyroid. This finding may not be serious, but you may need more tests. Your doctor can help you decide what other tests you may need, if any.  What can I expect from the results?  About 1 out of 4 LDCT exams will find something that may need more tests. Most of the time, these findings are lung nodules. Lung nodules are very small collections of tissue in the lung. These nodules are very common, and the vast majority--more than 97 percent--are not cancer  (benign). Most are normal lymph nodes or small areas of scarring from past infections.  But, if a small lung nodule is found to be cancer, the cancer can be cured more than 90 percent of the time. To know if the nodule is cancer, we may need to get more images before your next yearly screening exam. If the nodule has suspicious features (for example, it is large, has an odd shape or grows over time), we will refer you to a specialist for further testing.  Will my doctor also get the results?  Yes. Your doctor will get a copy of your results.  Is it okay to keep smoking now that there s a cancer screening exam?  No. Tobacco is one of the strongest cancer-causing agents. It causes not only lung cancer, but other cancers and cardiovascular (heart) diseases as well. The damage caused by smoking builds over time. This means that the longer you smoke, the higher your risk of disease. While it is never too late to quit, the sooner you quit, the better.  Where can I find help to quit smoking?  The best way to prevent lung cancer is to stop smoking. If you have already quit smoking, congratulations and keep it up! For help on quitting smoking, please call [employer] at 5-281-QUITNOW (1-573.764.8462) or the American Cancer Society at 1-762.564.5050 to find local resources near you.  One-on-one health coaching:  If you d prefer to work individually with a health care provider on tobacco cessation, we offer:     Medication Therapy Management:  Our specially trained pharmacists work closely with you and your doctor to help you quit smoking.  Call [phone] or [phone] (toll free).

## 2025-04-16 ENCOUNTER — TELEPHONE (OUTPATIENT)
Dept: FAMILY MEDICINE | Facility: OTHER | Age: 75
End: 2025-04-16

## 2025-04-16 NOTE — TELEPHONE ENCOUNTER
Screening Questions for the Scheduling of Screening Colonoscopies     (If Colonoscopy is diagnostic, Provider should review the chart before scheduling.)    Are you younger than 50 or older than 80?  No    Do you take aspirin or fish oil?  Yes:  (if yes, tell patient to stop 1 week prior to Colonoscopy)    Do you take warfarin (Coumadin), clopidogrel (Plavix), apixaban (Eliquis), dabigatram (Pradaxa), rivaroxaban (Xarelto) or any blood thinner? No    Do you use oxygen at home?  No    Do you have kidney disease? No    Are you on dialysis? No    Have you had a stroke or heart attack in the last year? No    Have you had a stent in your heart or any blood vessel in the last year? No    Have you had a transplant of any organ?  No    Have you had a colonoscopy or upper endoscopy (EGD) before?  YES. Date-2018.         Date of scheduled Colonoscopy: 6/17/25    Provider: Dr. Julien     [facility]

## 2025-04-16 NOTE — RESULT ENCOUNTER NOTE
Stable labs- A1C is up a bit to 6.1  Continue to work on low saturated fat diet, low carb / low sugar diet    [facility]  [phone]

## 2025-05-05 ENCOUNTER — HOSPITAL ENCOUNTER (OUTPATIENT)
Facility: HOSPITAL | Age: 75
End: 2025-05-05
Attending: STUDENT IN AN ORGANIZED HEALTH CARE EDUCATION/TRAINING PROGRAM | Admitting: STUDENT IN AN ORGANIZED HEALTH CARE EDUCATION/TRAINING PROGRAM
Payer: COMMERCIAL

## 2025-06-11 ENCOUNTER — ANESTHESIA EVENT (OUTPATIENT)
Dept: SURGERY | Facility: HOSPITAL | Age: 75
End: 2025-06-11

## 2025-06-11 RX ORDER — ONDANSETRON 2 MG/ML
4 INJECTION INTRAMUSCULAR; INTRAVENOUS EVERY 30 MIN PRN
Status: CANCELLED | OUTPATIENT
Start: 2025-06-11

## 2025-06-11 RX ORDER — DEXAMETHASONE SODIUM PHOSPHATE 10 MG/ML
4 INJECTION, SOLUTION INTRAMUSCULAR; INTRAVENOUS
Status: CANCELLED | OUTPATIENT
Start: 2025-06-11

## 2025-06-11 RX ORDER — ONDANSETRON 4 MG/1
4 TABLET, ORALLY DISINTEGRATING ORAL EVERY 30 MIN PRN
Status: CANCELLED | OUTPATIENT
Start: 2025-06-11

## 2025-06-11 RX ORDER — SODIUM CHLORIDE, SODIUM LACTATE, POTASSIUM CHLORIDE, CALCIUM CHLORIDE 600; 310; 30; 20 MG/100ML; MG/100ML; MG/100ML; MG/100ML
INJECTION, SOLUTION INTRAVENOUS CONTINUOUS
Status: CANCELLED | OUTPATIENT
Start: 2025-06-11

## 2025-06-11 RX ORDER — NALOXONE HYDROCHLORIDE 0.4 MG/ML
0.1 INJECTION, SOLUTION INTRAMUSCULAR; INTRAVENOUS; SUBCUTANEOUS
Status: CANCELLED | OUTPATIENT
Start: 2025-06-11

## 2025-06-11 RX ORDER — LIDOCAINE 40 MG/G
CREAM TOPICAL
Status: CANCELLED | OUTPATIENT
Start: 2025-06-11

## 2025-06-11 ASSESSMENT — LIFESTYLE VARIABLES: TOBACCO_USE: 1

## 2025-06-11 NOTE — ANESTHESIA PREPROCEDURE EVALUATION
Anesthesia Pre-Procedure Evaluation    Patient: Aga Gary   MRN: [id number] : 1950          Procedure : Procedure(s):  COLONOSCOPY         Past Medical History:   Diagnosis Date    Benign essential hypertension 2018    Calculus of kidney 1/3/2020    Current mild episode of major depressive disorder without prior episode 3/1/2021    Hyperlipidemia LDL goal <100 2018    Taking a statin medication 2018    Tobacco abuse 2018    Type 2 diabetes mellitus without complication, without long-term current use of insulin (H) 2020      Past Surgical History:   Procedure Laterality Date    ABDOMEN SURGERY      2 c-sections    CHOLECYSTECTOMY      GYN SURGERY      total hyst,, no cervix    HEAD & NECK SURGERY      tumor neck, benign    ORTHOPEDIC SURGERY Bilateral     carpul tunnel     ZZHC REMOVAL OF NAIL PLATE SIMPLE SINGLE  2018    removal of 1/4  left gt toenail, local       No Known Allergies   Social History     Tobacco Use    Smoking status: Heavy Smoker     Current packs/day: 1.00     Average packs/day: 1 pack/day for 53.4 years (53.4 ttl pk-yrs)     Types: Cigarettes     Start date: 1972    Smokeless tobacco: Never    Tobacco comments:     pt is slowly cutting down- 1 pack a day    Substance Use Topics    Alcohol use: No      Wt Readings from Last 1 Encounters:   04/15/25 77.2 kg (170 lb 3.2 oz)        Anesthesia Evaluation   Pt has had prior anesthetic.         ROS/MED HX  ENT/Pulmonary:     (+)     ADE risk factors,  hypertension,         tobacco use, Current use,  53  Pack-Year Hx,                      Neurologic:       Cardiovascular:     (+) Dyslipidemia hypertension-range: amlodipine, losartan, metoprolol/ -   -  - -   Taking blood thinners  Instructions Given to patient: asa.                            Previous cardiac testing   Echo: Date: Results:    Stress Test:  Date: Results:    ECG Reviewed:  Date:  Results:  HR 75, NSR, nonspecific ST and T wave  "abnormality  Cath:  Date: Results:      METS/Exercise Tolerance:     Hematologic:       Musculoskeletal:       GI/Hepatic:     (+)        bowel prep,            Renal/Genitourinary: Comment: 2020 kidney stone      Endo:     (+)  type II DM, Last HgA1c: 6.1, date: 4/15/25, Not using insulin,     thyroid problem, Thyroid disease - Other h/o partial lobectomy: 4/15/25 TSH elevated to 6.12, normal T4 (not on any thyroid supplements),    Obesity,       Psychiatric/Substance Use:     (+) psychiatric history depression       Infectious Disease:       Malignancy:       Other:              Physical Exam    OUTSIDE LABS:  CBC:   Lab Results   Component Value Date    WBC 10.7 04/15/2025    WBC 9.3 09/03/2021    HGB 14.3 04/15/2025    HGB 14.6 09/03/2021    HCT 42.4 04/15/2025    HCT 43.1 09/03/2021     04/15/2025     (L) 09/03/2021     BMP:   Lab Results   Component Value Date     04/15/2025     05/10/2024    POTASSIUM 4.1 04/15/2025    POTASSIUM 4.1 05/10/2024    CHLORIDE 105 04/15/2025    CHLORIDE 102 05/10/2024    CO2 24 04/15/2025    CO2 23 05/10/2024    BUN 11.7 04/15/2025    BUN 14.3 05/10/2024    CR 0.73 04/15/2025    CR 0.82 05/10/2024     (H) 04/15/2025     (H) 05/10/2024     COAGS: No results found for: \"PTT\", \"INR\", \"FIBR\"  POC: No results found for: \"BGM\", \"HCG\", \"HCGS\"  HEPATIC:   Lab Results   Component Value Date    ALBUMIN 4.3 04/15/2025    PROTTOTAL 8.1 04/15/2025    ALT 25 04/15/2025    AST 32 04/15/2025    ALKPHOS 85 04/15/2025    BILITOTAL 0.8 04/15/2025     OTHER:   Lab Results   Component Value Date    LACT 1.8 12/16/2019    A1C 6.1 (H) 04/15/2025    LASHELL 9.6 04/15/2025    LIPASE 98 12/16/2019    TSH 6.12 (H) 04/15/2025    T4 1.07 04/15/2025       Anesthesia Plan                  Consents            Postoperative Care         Comments:    Other Comments: Chart reviewed hl - same day surg                Taina Pepe, APRN CNP    I have reviewed the pertinent " notes and labs in the chart from the past 30 days and (re)examined the patient.  Any updates or changes from those notes are reflected in this note.    Clinically Significant Risk Factors Present on Admission                   # Hypertension: Noted on problem list

## 2025-06-16 ENCOUNTER — TELEPHONE (OUTPATIENT)
Dept: SURGERY | Facility: OTHER | Age: 75
End: 2025-06-16

## 2025-06-16 NOTE — TELEPHONE ENCOUNTER
Contacted patient to complete pre-admission call for upcoming colonoscopy with Dr. Julien on 6/17.    Patient states she wants to cancel and she will get back in touch with us if she wants to reschedule.

## 2025-06-17 ENCOUNTER — ANESTHESIA (OUTPATIENT)
Dept: SURGERY | Facility: HOSPITAL | Age: 75
End: 2025-06-17

## 2025-07-15 ENCOUNTER — TRANSFERRED RECORDS (OUTPATIENT)
Dept: HEALTH INFORMATION MANAGEMENT | Facility: CLINIC | Age: 75
End: 2025-07-15

## 2025-07-19 ENCOUNTER — HEALTH MAINTENANCE LETTER (OUTPATIENT)
Age: 75
End: 2025-07-19

## 2025-09-04 ENCOUNTER — TELEPHONE (OUTPATIENT)
Dept: FAMILY MEDICINE | Facility: OTHER | Age: 75
End: 2025-09-04